# Patient Record
Sex: FEMALE | Race: WHITE | Employment: FULL TIME | URBAN - METROPOLITAN AREA
[De-identification: names, ages, dates, MRNs, and addresses within clinical notes are randomized per-mention and may not be internally consistent; named-entity substitution may affect disease eponyms.]

---

## 2017-01-12 ENCOUNTER — ONCOLOGY VISIT (OUTPATIENT)
Dept: ONCOLOGY | Facility: CLINIC | Age: 67
End: 2017-01-12
Attending: PHYSICIAN ASSISTANT
Payer: COMMERCIAL

## 2017-01-12 ENCOUNTER — APPOINTMENT (OUTPATIENT)
Dept: LAB | Facility: CLINIC | Age: 67
End: 2017-01-12
Attending: INTERNAL MEDICINE
Payer: COMMERCIAL

## 2017-01-12 VITALS
WEIGHT: 119.7 LBS | SYSTOLIC BLOOD PRESSURE: 180 MMHG | DIASTOLIC BLOOD PRESSURE: 78 MMHG | HEART RATE: 86 BPM | OXYGEN SATURATION: 99 % | BODY MASS INDEX: 19.33 KG/M2 | TEMPERATURE: 98.1 F

## 2017-01-12 DIAGNOSIS — C50.212 CARCINOMA OF UPPER-INNER QUADRANT OF LEFT FEMALE BREAST (H): ICD-10-CM

## 2017-01-12 LAB
ALBUMIN SERPL-MCNC: 3.9 G/DL (ref 3.4–5)
ALP SERPL-CCNC: 133 U/L (ref 40–150)
ALT SERPL W P-5'-P-CCNC: 24 U/L (ref 0–50)
ANION GAP SERPL CALCULATED.3IONS-SCNC: 10 MMOL/L (ref 3–14)
AST SERPL W P-5'-P-CCNC: 17 U/L (ref 0–45)
BASOPHILS # BLD AUTO: 0.1 10E9/L (ref 0–0.2)
BASOPHILS NFR BLD AUTO: 1.1 %
BILIRUB SERPL-MCNC: 0.4 MG/DL (ref 0.2–1.3)
BUN SERPL-MCNC: 14 MG/DL (ref 7–30)
CALCIUM SERPL-MCNC: 9.4 MG/DL (ref 8.5–10.1)
CHLORIDE SERPL-SCNC: 101 MMOL/L (ref 94–109)
CO2 SERPL-SCNC: 27 MMOL/L (ref 20–32)
CREAT SERPL-MCNC: 0.8 MG/DL (ref 0.52–1.04)
DIFFERENTIAL METHOD BLD: NORMAL
EOSINOPHIL # BLD AUTO: 0.3 10E9/L (ref 0–0.7)
EOSINOPHIL NFR BLD AUTO: 2.9 %
ERYTHROCYTE [DISTWIDTH] IN BLOOD BY AUTOMATED COUNT: 11.9 % (ref 10–15)
GFR SERPL CREATININE-BSD FRML MDRD: 72 ML/MIN/1.7M2
GLUCOSE SERPL-MCNC: 112 MG/DL (ref 70–99)
HCT VFR BLD AUTO: 35.3 % (ref 35–47)
HGB BLD-MCNC: 11.9 G/DL (ref 11.7–15.7)
IMM GRANULOCYTES # BLD: 0.1 10E9/L (ref 0–0.4)
IMM GRANULOCYTES NFR BLD: 0.6 %
LYMPHOCYTES # BLD AUTO: 2 10E9/L (ref 0.8–5.3)
LYMPHOCYTES NFR BLD AUTO: 23.2 %
MCH RBC QN AUTO: 30.4 PG (ref 26.5–33)
MCHC RBC AUTO-ENTMCNC: 33.7 G/DL (ref 31.5–36.5)
MCV RBC AUTO: 90 FL (ref 78–100)
MONOCYTES # BLD AUTO: 0.6 10E9/L (ref 0–1.3)
MONOCYTES NFR BLD AUTO: 7.4 %
NEUTROPHILS # BLD AUTO: 5.6 10E9/L (ref 1.6–8.3)
NEUTROPHILS NFR BLD AUTO: 64.8 %
NRBC # BLD AUTO: 0 10*3/UL
NRBC BLD AUTO-RTO: 0 /100
PLATELET # BLD AUTO: 266 10E9/L (ref 150–450)
POTASSIUM SERPL-SCNC: 3.7 MMOL/L (ref 3.4–5.3)
PROT SERPL-MCNC: 7.2 G/DL (ref 6.8–8.8)
RBC # BLD AUTO: 3.92 10E12/L (ref 3.8–5.2)
SODIUM SERPL-SCNC: 138 MMOL/L (ref 133–144)
WBC # BLD AUTO: 8.6 10E9/L (ref 4–11)

## 2017-01-12 PROCEDURE — 80053 COMPREHEN METABOLIC PANEL: CPT | Performed by: INTERNAL MEDICINE

## 2017-01-12 PROCEDURE — 85025 COMPLETE CBC W/AUTO DIFF WBC: CPT | Performed by: INTERNAL MEDICINE

## 2017-01-12 PROCEDURE — 99212 OFFICE O/P EST SF 10 MIN: CPT | Mod: 25,ZF

## 2017-01-12 PROCEDURE — 36415 COLL VENOUS BLD VENIPUNCTURE: CPT

## 2017-01-12 PROCEDURE — 99214 OFFICE O/P EST MOD 30 MIN: CPT | Mod: ZP | Performed by: PHYSICIAN ASSISTANT

## 2017-01-12 ASSESSMENT — PAIN SCALES - GENERAL: PAINLEVEL: NO PAIN (0)

## 2017-01-12 NOTE — PROGRESS NOTES
DIAGNOSIS:  Clinical stage II infiltrating ductal carcinoma of the left breast. The patient had noticed a mass in her left breast and went in for evaluation. Mammogram on 09/13/2013 revealed a 2.5 x 2.0 x 0.8 cm left breast mass. Ultrasound-guided biopsy on 09/17/2013 revealed infiltrating ductal carcinoma, Senath grade 3. Tumor was weakly to moderately positive for estrogen receptors and weakly positive for progesterone receptors. HER-2 was amplified. MRI of the breasts revealed a 1.6 x 1.6 x 1.6 cm mass in her left breast and her tumor was too small for the I-SPY-2 clinical trial. She did begin neoadjuvant treatment with weekly Taxol and Herceptin on 10/18/2013 and completed 12 doses on 01/03/2013. She started Adriamycin/Cytoxan (AC) on 01/09/2014 and completed 4 cycles on 02/20/2014. She went on to have a left lumpectomy with left sentinel lymph node biopsy on 03/17/2014. There was no residual cancer. Zero out of 1 lymph nodes was positive. She restarted Herceptin on 04/02/2014 and completed her year on 12/09/2014. She completed left-sided breast radiation receiving 6000 cGy on 05/23/2014. She started letrozole in 03/2014.     INTERVAL HISTORY:  Radha returns to clinic today for followup of her breast carcinoma.  She quit smoking 3 months ago, and I did congratulate her on this.  She does admit to sinus congestion with some intermittent wheezing.  She remains on the letrozole and is tolerating the medication well without any significant side effects.  She denies any chest pain, shortness of breath, cough, nausea, vomiting, abdominal pain, new bone pains or headaches.  She does not have a formal exercise program, but remains quite active at her job with walking.     MEDICATIONS:   Current Outpatient Prescriptions   Medication Sig Dispense Refill     letrozole (FEMARA) 2.5 MG tablet Take 1 tablet (2.5 mg) by mouth daily 90 tablet 3     simvastatin (ZOCOR) 20 MG tablet Take 1 tablet (20 mg) by mouth daily 90  tablet 1     losartan (COZAAR) 100 MG tablet Take 1 tablet (100 mg) by mouth daily 90 tablet 1     metFORMIN (GLUCOPHAGE) 500 MG tablet Take 1 tablet (500 mg) by mouth 2 times daily (with meals) 60 tablet 5     metoprolol (TOPROL-XL) 50 MG 24 hr tablet TAKE ONE TABLET BY MOUTH ONE TIME DAILY  30 tablet 11     hydrochlorothiazide (MICROZIDE) 12.5 MG capsule TAKE ONE CAPSULE BY MOUTH ONE TIME DAILY  90 capsule 3     Multiple Vitamins-Minerals (DIASENSE MULTIVITAMIN PO) Take 1 tablet by mouth daily       ascorbic acid (VITAMIN C) 250 MG CHEW Take 500 mg by mouth daily        Acetaminophen (TYLENOL PO) Take by mouth as needed 500 mg as needed       Calcium Carbonate (CALCIUM 600 PO) Take 1 tablet by mouth 2 times daily        Cholecalciferol (VITAMIN D) 1000 UNITS capsule Take 2 capsules by mouth daily        ALLERGIES:    Allergies   Allergen Reactions     Aspirin GI Disturbance     Dust Mites      And pollen       PHYSICAL EXAMINATION:  Vitals: /78 mmHg  Pulse 86  Temp(Src) 98.1  F (36.7  C) (Oral)  Wt 54.296 kg (119 lb 11.2 oz)  SpO2 99%  LMP  (LMP Unknown)  GENERAL:  A pleasant person in no acute distress.   HEENT:  Sclerae are nonicteric.    NECK:  Supple.   LYMPH NODES:  No peripheral lymphadenopathy noted in the axillary, supraclavicular, or cervical regions.   LUNGS:  Clear to auscultation bilaterally.   HEART:  Regular rate and rhythm, with no murmur appreciated.   ABDOMEN:  Bowel sounds are active.  Soft and nontender.  No hepatosplenomegaly or other masses appreciated.  LOWER EXTREMITIES:  Without pitting edema to the knees bilaterally.   NEUROLOGICAL:  Alert/orientated/able to answer all questions.  CN grossly intact.  BREAST: Right breast normal to inspection, no masses. Left breast, well healed surgical incision in the 10-12 o'clock position, no masses.     LAB:      1/12/2017 10:26   Sodium 138   Potassium 3.7   Chloride 101   Carbon Dioxide 27   Urea Nitrogen 14   Creatinine 0.80   GFR  Estimate 72   GFR Estimate If Black 87   Calcium 9.4   Anion Gap 10   Albumin 3.9   Protein Total 7.2   Bilirubin Total 0.4   Alkaline Phosphatase 133   ALT 24   AST 17   Glucose 112 (H)   WBC 8.6   Hemoglobin 11.9   Hematocrit 35.3   Platelet Count 266   RBC Count 3.92   MCV 90   MCH 30.4   MCHC 33.7   RDW 11.9   Diff Method Automated Method   % Neutrophils 64.8   % Lymphocytes 23.2   % Monocytes 7.4   % Eosinophils 2.9   % Basophils 1.1   % Immature Granulocytes 0.6   Nucleated RBCs 0   Absolute Neutrophil 5.6   Absolute Lymphocytes 2.0   Absolute Monocytes 0.6   Absolute Eosinophils 0.3   Absolute Basophils 0.1   Abs Immature Granulocytes 0.1   Absolute Nucleated RBC 0.0     IMPRESSION/PLAN:   1.  Clinical stage II infiltrating ductal carcinoma of the left breast, ER-positive, HER2-positive. Ms. Uribe continues to do well at this time.  She does not have any signs or symptoms that would suggest recurrence of her breast carcinoma.  We plan to continue the letrozole daily.  She had a mammogram in 10/2016 with no concerns, and we will continue these yearly.  We plan for her to follow up with Dr. Esparza in 3 months.  I did congratulate her on cessation of her smoking for the last    3 months.  I did encourage her to begin an exercise program.   2.  Bone health.  DEXA scan in 04/2014 was consistent with low bone density with a T-score of -2.4.  She remains active with walking at her job daily and takes calcium with vitamin D.  I suggested a follow-up bone density and she has declined at this time.  She understands that there is potential bone loss with the letrozole.       If there are no interval concerns, the patient will follow up in 3 months.     Michelle Coker PA-C

## 2017-01-12 NOTE — Clinical Note
1/12/2017      RE: Radha Uribe  [address]  [facility] [address]       DIAGNOSIS:  Clinical stage II infiltrating ductal carcinoma of the left breast. The patient had noticed a mass in her left breast and went in for evaluation. Mammogram on 09/13/2013 revealed a 2.5 x 2.0 x 0.8 cm left breast mass. Ultrasound-guided biopsy on 09/17/2013 revealed infiltrating ductal carcinoma, Daylin grade 3. Tumor was weakly to moderately positive for estrogen receptors and weakly positive for progesterone receptors. HER-2 was amplified. MRI of the breasts revealed a 1.6 x 1.6 x 1.6 cm mass in her left breast and her tumor was too small for the I-SPY-2 clinical trial. She did begin neoadjuvant treatment with weekly Taxol and Herceptin on 10/18/2013 and completed 12 doses on 01/03/2013. She started Adriamycin/Cytoxan (AC) on 01/09/2014 and completed 4 cycles on 02/20/2014. She went on to have a left lumpectomy with left sentinel lymph node biopsy on 03/17/2014. There was no residual cancer. Zero out of 1 lymph nodes was positive. She restarted Herceptin on 04/02/2014 and completed her year on 12/09/2014. She completed left-sided breast radiation receiving 6000 cGy on 05/23/2014. She started letrozole in 03/2014.     INTERVAL HISTORY:  Radha returns to clinic today for followup of her breast carcinoma.  She quit smoking 3 months ago, and I did congratulate her on this.  She does admit to sinus congestion with some intermittent wheezing.  She remains on the letrozole and is tolerating the medication well without any significant side effects.  She denies any chest pain, shortness of breath, cough, nausea, vomiting, abdominal pain, new bone pains or headaches.  She does not have a formal exercise program, but remains quite active at her job with walking.     MEDICATIONS:   Current Outpatient Prescriptions   Medication Sig Dispense Refill     letrozole (FEMARA) 2.5 MG tablet Take 1 tablet (2.5 mg) by mouth daily 90  tablet 3     simvastatin (ZOCOR) 20 MG tablet Take 1 tablet (20 mg) by mouth daily 90 tablet 1     losartan (COZAAR) 100 MG tablet Take 1 tablet (100 mg) by mouth daily 90 tablet 1     metFORMIN (GLUCOPHAGE) 500 MG tablet Take 1 tablet (500 mg) by mouth 2 times daily (with meals) 60 tablet 5     metoprolol (TOPROL-XL) 50 MG 24 hr tablet TAKE ONE TABLET BY MOUTH ONE TIME DAILY  30 tablet 11     hydrochlorothiazide (MICROZIDE) 12.5 MG capsule TAKE ONE CAPSULE BY MOUTH ONE TIME DAILY  90 capsule 3     Multiple Vitamins-Minerals (DIASENSE MULTIVITAMIN PO) Take 1 tablet by mouth daily       ascorbic acid (VITAMIN C) 250 MG CHEW Take 500 mg by mouth daily        Acetaminophen (TYLENOL PO) Take by mouth as needed 500 mg as needed       Calcium Carbonate (CALCIUM 600 PO) Take 1 tablet by mouth 2 times daily        Cholecalciferol (VITAMIN D) 1000 UNITS capsule Take 2 capsules by mouth daily        ALLERGIES:    Allergies   Allergen Reactions     Aspirin GI Disturbance     Dust Mites      And pollen       PHYSICAL EXAMINATION:  Vitals: /78 mmHg  Pulse 86  Temp(Src) 98.1  F (36.7  C) (Oral)  Wt 54.296 kg (119 lb 11.2 oz)  SpO2 99%  LMP  (LMP Unknown)  GENERAL:  A pleasant person in no acute distress.   HEENT:  Sclerae are nonicteric.    NECK:  Supple.   LYMPH NODES:  No peripheral lymphadenopathy noted in the axillary, supraclavicular, or cervical regions.   LUNGS:  Clear to auscultation bilaterally.   HEART:  Regular rate and rhythm, with no murmur appreciated.   ABDOMEN:  Bowel sounds are active.  Soft and nontender.  No hepatosplenomegaly or other masses appreciated.  LOWER EXTREMITIES:  Without pitting edema to the knees bilaterally.   NEUROLOGICAL:  Alert/orientated/able to answer all questions.  CN grossly intact.  BREAST: Right breast normal to inspection, no masses. Left breast, well healed surgical incision in the 10-12 o'clock position, no masses.     LAB:      1/12/2017 10:26   Sodium 138   Potassium 3.7    Chloride 101   Carbon Dioxide 27   Urea Nitrogen 14   Creatinine 0.80   GFR Estimate 72   GFR Estimate If Black 87   Calcium 9.4   Anion Gap 10   Albumin 3.9   Protein Total 7.2   Bilirubin Total 0.4   Alkaline Phosphatase 133   ALT 24   AST 17   Glucose 112 (H)   WBC 8.6   Hemoglobin 11.9   Hematocrit 35.3   Platelet Count 266   RBC Count 3.92   MCV 90   MCH 30.4   MCHC 33.7   RDW 11.9   Diff Method Automated Method   % Neutrophils 64.8   % Lymphocytes 23.2   % Monocytes 7.4   % Eosinophils 2.9   % Basophils 1.1   % Immature Granulocytes 0.6   Nucleated RBCs 0   Absolute Neutrophil 5.6   Absolute Lymphocytes 2.0   Absolute Monocytes 0.6   Absolute Eosinophils 0.3   Absolute Basophils 0.1   Abs Immature Granulocytes 0.1   Absolute Nucleated RBC 0.0     IMPRESSION/PLAN:   1.  Clinical stage II infiltrating ductal carcinoma of the left breast, ER-positive, HER2-positive. Ms. Uribe continues to do well at this time.  She does not have any signs or symptoms that would suggest recurrence of her breast carcinoma.  We plan to continue the letrozole daily.  She had a mammogram in 10/2016 with no concerns, and we will continue these yearly.  We plan for her to follow up with Dr. Esparza in 3 months.  I did congratulate her on cessation of her smoking for the last    3 months.  I did encourage her to begin an exercise program.   2.  Bone health.  DEXA scan in 04/2014 was consistent with low bone density with a T-score of -2.4.  She remains active with walking at her job daily and takes calcium with vitamin D.  I suggested a follow-up bone density and she has declined at this time.  She understands that there is potential bone loss with the letrozole.       If there are no interval concerns, the patient will follow up in 3 months.     Michelle Coker PA-C

## 2017-01-12 NOTE — Clinical Note
1/12/2017       RE: Radha Uribe  [address]  [facility] [id number]     Dear Colleague,    Thank you for referring your patient, Radha Uribe, to the [facility]. Please see a copy of my visit note below.    DIAGNOSIS:  Clinical stage II infiltrating ductal carcinoma of the left breast. The patient had noticed a mass in her left breast and went in for evaluation. Mammogram on 09/13/2013 revealed a 2.5 x 2.0 x 0.8 cm left breast mass. Ultrasound-guided biopsy on 09/17/2013 revealed infiltrating ductal carcinoma, Daylin grade 3. Tumor was weakly to moderately positive for estrogen receptors and weakly positive for progesterone receptors. HER-2 was amplified. MRI of the breasts revealed a 1.6 x 1.6 x 1.6 cm mass in her left breast and her tumor was too small for the I-SPY-2 clinical trial. She did begin neoadjuvant treatment with weekly Taxol and Herceptin on 10/18/2013 and completed 12 doses on 01/03/2013. She started Adriamycin/Cytoxan (AC) on 01/09/2014 and completed 4 cycles on 02/20/2014. She went on to have a left lumpectomy with left sentinel lymph node biopsy on 03/17/2014. There was no residual cancer. Zero out of 1 lymph nodes was positive. She restarted Herceptin on 04/02/2014 and completed her year on 12/09/2014. She completed left-sided breast radiation receiving 6000 cGy on 05/23/2014. She started letrozole in 03/2014.     INTERVAL HISTORY:   Dictation on: 01/12/2017 10:59 AM by: JAIME MCKEE [[id number]]     MEDICATIONS:   Current Outpatient Prescriptions   Medication Sig Dispense Refill     letrozole (FEMARA) 2.5 MG tablet Take 1 tablet (2.5 mg) by mouth daily 90 tablet 3     simvastatin (ZOCOR) 20 MG tablet Take 1 tablet (20 mg) by mouth daily 90 tablet 1     losartan (COZAAR) 100 MG tablet Take 1 tablet (100 mg) by mouth daily 90 tablet 1     metFORMIN (GLUCOPHAGE) 500 MG tablet Take 1 tablet (500 mg) by mouth 2 times daily (with meals) 60 tablet 5     metoprolol  (TOPROL-XL) 50 MG 24 hr tablet TAKE ONE TABLET BY MOUTH ONE TIME DAILY  30 tablet 11     hydrochlorothiazide (MICROZIDE) 12.5 MG capsule TAKE ONE CAPSULE BY MOUTH ONE TIME DAILY  90 capsule 3     Multiple Vitamins-Minerals (DIASENSE MULTIVITAMIN PO) Take 1 tablet by mouth daily       ascorbic acid (VITAMIN C) 250 MG CHEW Take 500 mg by mouth daily        Acetaminophen (TYLENOL PO) Take by mouth as needed 500 mg as needed       Calcium Carbonate (CALCIUM 600 PO) Take 1 tablet by mouth 2 times daily        Cholecalciferol (VITAMIN D) 1000 UNITS capsule Take 2 capsules by mouth daily        ALLERGIES:    Allergies   Allergen Reactions     Aspirin GI Disturbance     Dust Mites      And pollen       PHYSICAL EXAMINATION:  Vitals: /78 mmHg  Pulse 86  Temp(Src) 98.1  F (36.7  C) (Oral)  Wt 54.296 kg (119 lb 11.2 oz)  SpO2 99%  LMP  (LMP Unknown)  GENERAL:  A pleasant person in no acute distress.   HEENT:  Sclerae are nonicteric.    NECK:  Supple.   LYMPH NODES:  No peripheral lymphadenopathy noted in the axillary, supraclavicular, or cervical regions.   LUNGS:  Clear to auscultation bilaterally.   HEART:  Regular rate and rhythm, with no murmur appreciated.   ABDOMEN:  Bowel sounds are active.  Soft and nontender.  No hepatosplenomegaly or other masses appreciated.  LOWER EXTREMITIES:  Without pitting edema to the knees bilaterally.   NEUROLOGICAL:  Alert/orientated/able to answer all questions.  CN grossly intact.  BREAST: Right breast normal to inspection, no masses. Left breast, well healed surgical incision in the 10-12 o'clock position, no masses.     LAB:      1/12/2017 10:26   Sodium 138   Potassium 3.7   Chloride 101   Carbon Dioxide 27   Urea Nitrogen 14   Creatinine 0.80   GFR Estimate 72   GFR Estimate If Black 87   Calcium 9.4   Anion Gap 10   Albumin 3.9   Protein Total 7.2   Bilirubin Total 0.4   Alkaline Phosphatase 133   ALT 24   AST 17   Glucose 112 (H)   WBC 8.6   Hemoglobin 11.9   Hematocrit  35.3   Platelet Count 266   RBC Count 3.92   MCV 90   MCH 30.4   MCHC 33.7   RDW 11.9   Diff Method Automated Method   % Neutrophils 64.8   % Lymphocytes 23.2   % Monocytes 7.4   % Eosinophils 2.9   % Basophils 1.1   % Immature Granulocytes 0.6   Nucleated RBCs 0   Absolute Neutrophil 5.6   Absolute Lymphocytes 2.0   Absolute Monocytes 0.6   Absolute Eosinophils 0.3   Absolute Basophils 0.1   Abs Immature Granulocytes 0.1   Absolute Nucleated RBC 0.0     IMPRESSION/PLAN:    Dictation on: 01/12/2017 11:01 AM by: MICHELLE MCKEE [[id number]]     Michelle Mckee PA-C    Again, thank you for allowing me to participate in the care of your patient.      Sincerely,    Michelle Mckee PA-C

## 2017-01-12 NOTE — NURSING NOTE
Chief Complaint   Patient presents with     Blood Draw     vs/labs by cma   See doc flowsheets for details. Provider paged to inform about pt's BP  In lab.    CONY Masters CMA

## 2017-01-12 NOTE — NURSING NOTE
"Radha Uribe is a 66 year old female who presents for:  Chief Complaint   Patient presents with     Blood Draw     vs/labs by [facility]     Oncology Clinic Visit     Breast Cancer        Initial Vitals:  /78 mmHg  Pulse 86  Temp(Src) 98.1  F (36.7  C) (Oral)  Wt 54.296 kg (119 lb 11.2 oz)  SpO2 99%  LMP  (LMP Unknown) Estimated body mass index is 19.33 kg/(m^2) as calculated from the following:    Height as of 7/12/16: 1.676 m (5' 5.98\").    Weight as of this encounter: 54.296 kg (119 lb 11.2 oz).. There is no height on file to calculate BSA. BP completed using cuff size: regular  No Pain (0) No LMP recorded (lmp unknown). Patient has had a hysterectomy. Allergies and medications reviewed.     Medications: Medication refills not needed today.  Pharmacy name entered into [facility]:    [id number] - [place], [place] - [address] [facility] - [place], [place] - [address]    Comments: Patient is not having any pain today.     6 minutes for nursing intake (face to face time)   Carmina Stahl CMA         "

## 2017-02-08 DIAGNOSIS — I10 HYPERTENSION GOAL BP (BLOOD PRESSURE) < 140/90: Primary | ICD-10-CM

## 2017-02-08 NOTE — TELEPHONE ENCOUNTER
hydrochlorothiazide (MICROZIDE) 12.5 MG capsule      Last Written Prescription Date: 12/31/15  Last Fill Quantity: 90, # refills: 3  Last Office Visit with G, P or [facility] prescribing provider: 5/18/16       POTASSIUM   Date Value Ref Range Status   01/12/2017 3.7 3.4 - 5.3 mmol/L Final     CREATININE   Date Value Ref Range Status   01/12/2017 0.80 0.52 - 1.04 mg/dL Final     BP Readings from Last 3 Encounters:   01/12/17 180/78   10/14/16 143/79   07/12/16 169/72

## 2017-02-10 RX ORDER — HYDROCHLOROTHIAZIDE 12.5 MG/1
1 CAPSULE ORAL DAILY
Qty: 90 CAPSULE | Refills: 3 | Status: SHIPPED | OUTPATIENT
Start: 2017-02-10 | End: 2018-02-16

## 2017-02-10 NOTE — TELEPHONE ENCOUNTER
Medication is being filled for 1 time refill only due to:  Patient needs to be seen because follow up on B/P.

## 2017-04-09 NOTE — PROGRESS NOTES
HISTORY OF PRESENT ILLNESS: Radha Uribe is a 66-year-old woman who was referred to our Oncology Clinic by Dr. Crespo for evaluation of a newly diagnosed breast cancer. Radha was in her usual health until last month, when she was asked to do a mammogram by her primary care doctor, Dr. Preston. She has not been doing mammogram for the last 20 years, and most of the time she does self breast examinations. She noticed a mass, which was not painful, in her left breast, and immediately went for a mammogram. The mammogram was done on 09/13/2013, which revealed a 2.5 x 2 x 0.8 cm mass, so she had an ultrasound-guided core needle biopsy on 09/17, which revealed infiltrating ductal carcinoma, [place] grade 3 (score 3 for tubule formation, 3 for nuclear atypia, 3 for mitosis). No angiolymphatic invasion was identified. There was a focal necrosis of invasive carcinoma identified, and no definite carcinoma in situ was identified. The greatest length of the needle biopsy at the left breast in the 2 o'clock position showed benign breast tissue with fibrocystic changes. There is a focal intraductal microcalcification identified. The tumor was 15-20% weakly to moderately positive for estrogen receptors and 5% positive for progesterone receptors. HER-2 was amplified. The ratio of HER-2/CEP17 signals were more than 5.9 by FISH analysis. The signal/nucleus was more than 19.4. She was seen by Dr. Crespo at the [facility] on 09/24, who recommended to do an MRI of the breast due to density of the breast tissues, as well as referred her to the Breast Oncology Clinic for further staging workup and management of her breast cancer. MRI of the breast showed BI-RADS 6 and mass in the left breast at the 11 o'clock position that measures about 1.6 x 1.6 x 1.6 cm. The longest diameter in sagittal reconstruction was 1.9 cm. There is no evidence of axillary lymph node involvement on the MRI.   She had neoadjuvant chemotherapy , mastectomy and SNL  sampling which showed pathologic CR and no disease in the LN , currently getting adjuvant radiation and herceptin and letrozole.    FOLLOWUP NOTE      INTERVAL HISTORY:  Radha returns to the clinic.  She has been feeling generally well, although she has some degree of anxiety.  She has no pain, no fatigue, no depression.  She had stopped smoking for 4 months, and then restarted her smoking.  She continues working at [employer].  She has about a 30-pack-year smoking history.      REVIEW OF SYSTEMS:  She has some wheezing.  The remainder of a 10-point review of systems is negative.      PHYSICAL EXAMINATION:   VITAL SIGNS:  Blood pressure 140/80, temperature 98.2, pulse 75, respirations not recorded, weight 50.9 kg.   GENERAL:  Radha appeared generally well.  She has no alopecia.   HEENT:  Oropharynx is without lesions.   LYMPH:  There is no palpable cervical, supraclavicular, subclavicular or axillary lymphadenopathy.   BREASTS:  Examination of the right breast is without masses.  Examination of the left breast reveals a well-healed incision at the 11 o'clock position, 2 fingerbreadths from the nipple-areolar complex, without erythema or masses.  No masses in the left breast.  The left axillary incision is well-healed without erythema or masses.   LUNGS:  Clear to percussion and auscultation, except for wheezing in the right upper lobe.   HEART:  There is a regular rate and rhythm, and a 2/6 systolic murmur heard best at left sternal border.   ABDOMEN:  Soft and nontender, without hepatosplenomegaly.   EXTREMITIES:  Without edema.   PSYCHIATRIC:  Mood and affect were normal.      LABORATORY DATA:  The CMP is within normal limits.  CBC is within normal limits.  Glucose was 116.      ASSESSMENT AND PLAN:     1.  Radha Uribe is a 67-year-old woman who was diagnosed in 2013 with a stage IIA, T2 N2 MX, invasive ductal carcinoma of the left breast.  The tumor was positive for estrogen receptor in 15-20% of the cells,  TN-positive in 5% of the cells, and HER2 was amplified.  The left breast mass measured 2.5 x 2 x 0.8 cm in size at the 11 o'clock position of the left breast on the mammogram.  She was treated with neoadjuvant Taxol and Herceptin for 12 weeks followed by dose-dense AC for 4 cycles.  She had a complete pathologic response in the lumpectomy specimen.  She completed radiation in 04/2014.  She tolerated the treatment quite well.  She began letrozole, and has tolerated it very well with no hot flashes, myalgias or arthralgias.  She had a mammogram on 10/13/2016 which showed benign results.   2.  Letrozole therapy is recommended for 10 years given the outcome of the MA.17R trial.  We could consider the breast cancer index to reduce the length of time and treatment with letrozole from 10 years to 5 years, but on the other hand this has not been applied for HER2-positive breast cancers, and she is at somewhat increased risk of distant recurrence.   3.  Continue with mammography performed yearly.   4.  Basal cell carcinoma of the left chin was removed.  She will follow up with Dr. Morgan in Dermatology.   5.  Thyroid mass.  She did have a thyroid biopsy, which showed benign results, and she will follow up with Dr. Hernandez.   6.  Focal wheezing on the lung exam.  I did recommend a spiral CT of the chest.  She refused.  She will follow up with her primary care provider at the [facility] in another month and will discuss this with him.  I expressed my concerns about her smoking, and I strongly recommend that she cease tobacco use.  She said that she will work on it, but she does not have any plans to stop smoking at the present time.   7.  A 2/6 systolic murmur at the left sternal border.  I discussed with Radha that it would make sense to have her primary care provider perform an echocardiogram.   8.  DEXA scan showed osteopenia with a most valid negative T score of -2.4 on 04/30/2014.  Radha does not want to pursue  this finding.  She does not want treatment with Reclast.   9.  Followup.  Radha will see Michelle Coker in followup in 3 months, and then in 6 months with me with a mammogram. Follow up with Michelle Coker July 13 and with me 10-12-17 with a mammogram.  CBC, CMP with follow up visits.       Thank you for allowing us to continue to participate in Radha Uribe's care.      South Esparza MD          [facility]     I spent 30 minutes with the patient more than 50% of which was in counseling and coordination of care.

## 2017-04-13 ENCOUNTER — ONCOLOGY VISIT (OUTPATIENT)
Dept: ONCOLOGY | Facility: CLINIC | Age: 67
End: 2017-04-13
Attending: INTERNAL MEDICINE
Payer: COMMERCIAL

## 2017-04-13 ENCOUNTER — APPOINTMENT (OUTPATIENT)
Dept: LAB | Facility: CLINIC | Age: 67
End: 2017-04-13
Attending: INTERNAL MEDICINE
Payer: COMMERCIAL

## 2017-04-13 VITALS
DIASTOLIC BLOOD PRESSURE: 80 MMHG | HEART RATE: 75 BPM | TEMPERATURE: 98.2 F | BODY MASS INDEX: 18.12 KG/M2 | WEIGHT: 112.2 LBS | SYSTOLIC BLOOD PRESSURE: 140 MMHG

## 2017-04-13 DIAGNOSIS — C50.212 CARCINOMA OF UPPER-INNER QUADRANT OF LEFT FEMALE BREAST (H): ICD-10-CM

## 2017-04-13 LAB
ALBUMIN SERPL-MCNC: 4 G/DL (ref 3.4–5)
ALP SERPL-CCNC: 63 U/L (ref 40–150)
ALT SERPL W P-5'-P-CCNC: 18 U/L (ref 0–50)
ANION GAP SERPL CALCULATED.3IONS-SCNC: 7 MMOL/L (ref 3–14)
AST SERPL W P-5'-P-CCNC: 18 U/L (ref 0–45)
BASOPHILS # BLD AUTO: 0.1 10E9/L (ref 0–0.2)
BASOPHILS NFR BLD AUTO: 0.7 %
BILIRUB SERPL-MCNC: 0.8 MG/DL (ref 0.2–1.3)
BUN SERPL-MCNC: 14 MG/DL (ref 7–30)
CALCIUM SERPL-MCNC: 9.2 MG/DL (ref 8.5–10.1)
CHLORIDE SERPL-SCNC: 104 MMOL/L (ref 94–109)
CO2 SERPL-SCNC: 25 MMOL/L (ref 20–32)
CREAT SERPL-MCNC: 0.78 MG/DL (ref 0.52–1.04)
DIFFERENTIAL METHOD BLD: NORMAL
EOSINOPHIL # BLD AUTO: 0.1 10E9/L (ref 0–0.7)
EOSINOPHIL NFR BLD AUTO: 1.3 %
ERYTHROCYTE [DISTWIDTH] IN BLOOD BY AUTOMATED COUNT: 12.4 % (ref 10–15)
GFR SERPL CREATININE-BSD FRML MDRD: 74 ML/MIN/1.7M2
GLUCOSE SERPL-MCNC: 116 MG/DL (ref 70–99)
HCT VFR BLD AUTO: 36.2 % (ref 35–47)
HGB BLD-MCNC: 12.1 G/DL (ref 11.7–15.7)
IMM GRANULOCYTES # BLD: 0 10E9/L (ref 0–0.4)
IMM GRANULOCYTES NFR BLD: 0.3 %
LYMPHOCYTES # BLD AUTO: 1.7 10E9/L (ref 0.8–5.3)
LYMPHOCYTES NFR BLD AUTO: 16.5 %
MCH RBC QN AUTO: 30.2 PG (ref 26.5–33)
MCHC RBC AUTO-ENTMCNC: 33.4 G/DL (ref 31.5–36.5)
MCV RBC AUTO: 90 FL (ref 78–100)
MONOCYTES # BLD AUTO: 0.7 10E9/L (ref 0–1.3)
MONOCYTES NFR BLD AUTO: 7 %
NEUTROPHILS # BLD AUTO: 7.6 10E9/L (ref 1.6–8.3)
NEUTROPHILS NFR BLD AUTO: 74.2 %
NRBC # BLD AUTO: 0 10*3/UL
NRBC BLD AUTO-RTO: 0 /100
PLATELET # BLD AUTO: 249 10E9/L (ref 150–450)
POTASSIUM SERPL-SCNC: 3.8 MMOL/L (ref 3.4–5.3)
PROT SERPL-MCNC: 7.4 G/DL (ref 6.8–8.8)
RBC # BLD AUTO: 4.01 10E12/L (ref 3.8–5.2)
SODIUM SERPL-SCNC: 136 MMOL/L (ref 133–144)
WBC # BLD AUTO: 10.2 10E9/L (ref 4–11)

## 2017-04-13 PROCEDURE — 80053 COMPREHEN METABOLIC PANEL: CPT | Performed by: INTERNAL MEDICINE

## 2017-04-13 PROCEDURE — 99212 OFFICE O/P EST SF 10 MIN: CPT | Mod: ZF

## 2017-04-13 PROCEDURE — 85025 COMPLETE CBC W/AUTO DIFF WBC: CPT | Performed by: INTERNAL MEDICINE

## 2017-04-13 PROCEDURE — 99214 OFFICE O/P EST MOD 30 MIN: CPT | Mod: ZP | Performed by: INTERNAL MEDICINE

## 2017-04-13 NOTE — NURSING NOTE
"Radha Uribe is a 67 year old female who presents for:  Chief Complaint   Patient presents with     Blood Draw     vs/labs drawn in right AC by [facility]     Oncology Clinic Visit     Return for Breast Ca f\u , labs         Initial Vitals:  /80  Pulse 75  Temp 98.2  F (36.8  C) (Oral)  Wt 50.9 kg (112 lb 3.2 oz)  LMP  (LMP Unknown)  BMI 18.12 kg/m2 Estimated body mass index is 18.12 kg/(m^2) as calculated from the following:    Height as of 7/12/16: 1.676 m (5' 5.98\").    Weight as of this encounter: 50.9 kg (112 lb 3.2 oz).. Body surface area is 1.54 meters squared. BP completed using cuff size: regular  Data Unavailable No LMP recorded (lmp unknown). Patient has had a hysterectomy. Allergies and medications reviewed.     Medications: Medication refills not needed today.  Pharmacy name entered into [employer] [id number] [place], [place] - [facility]    Comments:     5  minutes for nursing intake (face to face time)   Rosmery Ashby MA        "

## 2017-04-13 NOTE — MR AVS SNAPSHOT
After Visit Summary   4/13/2017    Radha Uribe    MRN: [id number]           Patient Information     Date Of Birth          1950        Visit Information        Provider Department      4/13/2017 9:30 AM South Esparza MD [facility]        Today's Diagnoses     Carcinoma of upper-inner quadrant of left female breast (H)           Follow-ups after your visit        Follow-up notes from your care team     Return in about 6 months (around 10/12/2017).      Who to contact     If you have questions or need follow up information about today's clinic visit or your schedule please contact [facility] directly at [phone].  Normal or non-critical lab and imaging results will be communicated to you by MyChart, letter or phone within 4 business days after the clinic has received the results. If you do not hear from us within 7 days, please contact the clinic through [employer] or phone. If you have a critical or abnormal lab result, we will notify you by phone as soon as possible.  Submit refill requests through [employer] or call your pharmacy and they will forward the refill request to us. Please allow 3 business days for your refill to be completed.          Additional Information About Your Visit        MyChart Information     [employer] gives you secure access to your electronic health record. If you see a primary care provider, you can also send messages to your care team and make appointments. If you have questions, please call your primary care clinic.  If you do not have a primary care provider, please call [phone] and they will assist you.        Care EveryWhere ID     This is your Care EveryWhere ID. This could be used by other organizations to access your [place] medical records  [id number]        Your Vitals Were     Pulse Temperature Last Period BMI (Body Mass Index)          75 98.2  F (36.8  C) (Oral) (LMP Unknown) 18.12 kg/m2          Blood Pressure from Last 3 Encounters:   04/13/17 140/80   01/12/17 180/78   10/14/16 143/79    Weight from Last 3 Encounters:   04/13/17 50.9 kg (112 lb 3.2 oz)   01/12/17 54.3 kg (119 lb 11.2 oz)   10/14/16 48.5 kg (107 lb)              We Performed the Following     CBC with platelets differential     Comprehensive metabolic panel        Primary Care Provider Office Phone # Fax #    Kodak Preston [phone]       [facility] [address]  [facility] [address]        Thank you!     Thank you for choosing [facility]  for your care. Our goal is always to provide you with excellent care. Hearing back from our patients is one way we can continue to improve our services. Please take a few minutes to complete the written survey that you may receive in the mail after your visit with us. Thank you!             Your Updated Medication List - Protect others around you: Learn how to safely use, store and throw away your medicines at www.disposemymeds.org.          This list is accurate as of: 4/13/17 11:59 PM.  Always use your most recent med list.                   Brand Name Dispense Instructions for use    ascorbic acid 250 MG Chew chewable tablet    vitamin C     Take 500 mg by mouth daily       CALCIUM 600 PO      Take 1 tablet by mouth 2 times daily       DIASENSE MULTIVITAMIN PO      Take 1 tablet by mouth daily       hydrochlorothiazide 12.5 MG capsule    MICROZIDE    90 capsule    Take 1 capsule (12.5 mg) by mouth daily       letrozole 2.5 MG tablet    FEMARA    90 tablet    Take 1 tablet (2.5 mg) by mouth daily       losartan 100 MG tablet    COZAAR    90 tablet    Take 1 tablet (100 mg) by mouth daily       metFORMIN 500 MG tablet    GLUCOPHAGE    60 tablet    Take 1 tablet (500 mg) by mouth 2 times daily (with meals)       metoprolol 50 MG 24 hr tablet    TOPROL-XL    30 tablet    TAKE ONE TABLET BY MOUTH ONE TIME DAILY       simvastatin 20 MG tablet     ZOCOR    90 tablet    Take 1 tablet (20 mg) by mouth daily       TYLENOL PO      Take by mouth as needed 500 mg as needed       vitamin D 1000 UNITS capsule      Take 2 capsules by mouth daily

## 2017-04-13 NOTE — NURSING NOTE
Chief Complaint   Patient presents with     Blood Draw     vs/labs drawn in right AC by cma   See flowsheets.  Gris Masters, CMA

## 2017-04-13 NOTE — LETTER
4/13/2017       RE: Radha Uribe  [address]  [facility] [address]     Dear Colleague,    Thank you for referring your patient, Radha Uribe, to the [facility]. Please see a copy of my visit note below.    HISTORY OF PRESENT ILLNESS: Radha Uribe is a 66-year-old woman who was referred to our Oncology Clinic by Dr. Crespo for evaluation of a newly diagnosed breast cancer. Radha was in her usual health until last month, when she was asked to do a mammogram by her primary care doctor, Dr. Preston. She has not been doing mammogram for the last 20 years, and most of the time she does self breast examinations. She noticed a mass, which was not painful, in her left breast, and immediately went for a mammogram. The mammogram was done on 09/13/2013, which revealed a 2.5 x 2 x 0.8 cm mass, so she had an ultrasound-guided core needle biopsy on 09/17, which revealed infiltrating ductal carcinoma, [place] grade 3 (score 3 for tubule formation, 3 for nuclear atypia, 3 for mitosis). No angiolymphatic invasion was identified. There was a focal necrosis of invasive carcinoma identified, and no definite carcinoma in situ was identified. The greatest length of the needle biopsy at the left breast in the 2 o'clock position showed benign breast tissue with fibrocystic changes. There is a focal intraductal microcalcification identified. The tumor was 15-20% weakly to moderately positive for estrogen receptors and 5% positive for progesterone receptors. HER-2 was amplified. The ratio of HER-2/CEP17 signals were more than 5.9 by FISH analysis. The signal/nucleus was more than 19.4. She was seen by Dr. Crespo at the Breast Clinic on 09/24, who recommended to do an MRI of the breast due to density of the breast tissues, as well as referred her to the Breast Oncology Clinic for further staging workup and management of her breast cancer. MRI of the breast showed BI-RADS 6 and mass in the left breast at the 11  o'clock position that measures about 1.6 x 1.6 x 1.6 cm. The longest diameter in sagittal reconstruction was 1.9 cm. There is no evidence of axillary lymph node involvement on the MRI.   She had neoadjuvant chemotherapy , mastectomy and SNL sampling which showed pathologic CR and no disease in the LN , currently getting adjuvant radiation and herceptin and letrozole.    FOLLOWUP NOTE      INTERVAL HISTORY:  Radha returns to the clinic.  She has been feeling generally well, although she has some degree of anxiety.  She has no pain, no fatigue, no depression.  She had stopped smoking for 4 months, and then restarted her smoking.  She continues working at [employer].  She has about a 30-pack-year smoking history.      REVIEW OF SYSTEMS:  She has some wheezing.  The remainder of a 10-point review of systems is negative.      PHYSICAL EXAMINATION:   VITAL SIGNS:  Blood pressure 140/80, temperature 98.2, pulse 75, respirations not recorded, weight 50.9 kg.   GENERAL:  Radha appeared generally well.  She has no alopecia.   HEENT:  Oropharynx is without lesions.   LYMPH:  There is no palpable cervical, supraclavicular, subclavicular or axillary lymphadenopathy.   BREASTS:  Examination of the right breast is without masses.  Examination of the left breast reveals a well-healed incision at the 11 o'clock position, 2 fingerbreadths from the nipple-areolar complex, without erythema or masses.  No masses in the left breast.  The left axillary incision is well-healed without erythema or masses.   LUNGS:  Clear to percussion and auscultation, except for wheezing in the right upper lobe.   HEART:  There is a regular rate and rhythm, and a 2/6 systolic murmur heard best at left sternal border.   ABDOMEN:  Soft and nontender, without hepatosplenomegaly.   EXTREMITIES:  Without edema.   PSYCHIATRIC:  Mood and affect were normal.      LABORATORY DATA:  The CMP is within normal limits.  CBC is within normal limits.  Glucose was 116.       ASSESSMENT AND PLAN:     1.  Radha Uribe is a 67-year-old woman who was diagnosed in 2013 with a stage IIA, T2 N2 MX, invasive ductal carcinoma of the left breast.  The tumor was positive for estrogen receptor in 15-20% of the cells, [place]-positive in 5% of the cells, and HER2 was amplified.  The left breast mass measured 2.5 x 2 x 0.8 cm in size at the 11 o'clock position of the left breast on the mammogram.  She was treated with neoadjuvant Taxol and Herceptin for 12 weeks followed by dose-dense AC for 4 cycles.  She had a complete pathologic response in the lumpectomy specimen.  She completed radiation in 04/2014.  She tolerated the treatment quite well.  She began letrozole, and has tolerated it very well with no hot flashes, myalgias or arthralgias.  She had a mammogram on 10/13/2016 which showed benign results.   2.  Letrozole therapy is recommended for 10 years given the outcome of the MA.17R trial.  We could consider the breast cancer index to reduce the length of time and treatment with letrozole from 10 years to 5 years, but on the other hand this has not been applied for HER2-positive breast cancers, and she is at somewhat increased risk of distant recurrence.   3.  Continue with mammography performed yearly.   4.  Basal cell carcinoma of the left chin was removed.  She will follow up with Dr. Morgan in Dermatology.   5.  Thyroid mass.  She did have a thyroid biopsy, which showed benign results, and she will follow up with Dr. Hernandez.   6.  Focal wheezing on the lung exam.  I did recommend a spiral CT of the chest.  She refused.  She will follow up with her primary care provider at the [facility] in another month and will discuss this with him.  I expressed my concerns about her smoking, and I strongly recommend that she cease tobacco use.  She said that she will work on it, but she does not have any plans to stop smoking at the present time.   7.  A 2/6 systolic murmur at the left sternal  border.  I discussed with Radha that it would make sense to have her primary care provider perform an echocardiogram.   8.  DEXA scan showed osteopenia with a most valid negative T score of -2.4 on 04/30/2014.  Radha does not want to pursue this finding.  She does not want treatment with Reclast.   9.  Followup.  Radha will see Michelle Coker in followup in 3 months, and then in 6 months with me with a mammogram. Follow up with Michelle Coker July 13 and with me 10-12-17 with a mammogram.  CBC, CMP with follow up visits.       Thank you for allowing us to continue to participate in Radha Uribe's care.      South Esparza MD          [facility]     I spent 30 minutes with the patient more than 50% of which was in counseling and coordination of care.

## 2017-05-10 ENCOUNTER — OFFICE VISIT (OUTPATIENT)
Dept: FAMILY MEDICINE | Facility: CLINIC | Age: 67
End: 2017-05-10
Payer: COMMERCIAL

## 2017-05-10 VITALS
SYSTOLIC BLOOD PRESSURE: 156 MMHG | OXYGEN SATURATION: 98 % | RESPIRATION RATE: 16 BRPM | HEART RATE: 90 BPM | BODY MASS INDEX: 18.91 KG/M2 | TEMPERATURE: 96.7 F | HEIGHT: 65 IN | WEIGHT: 113.5 LBS | DIASTOLIC BLOOD PRESSURE: 84 MMHG

## 2017-05-10 DIAGNOSIS — E78.00 HYPERCHOLESTEROLEMIA: ICD-10-CM

## 2017-05-10 DIAGNOSIS — E11.9 TYPE 2 DIABETES MELLITUS WITHOUT COMPLICATION, WITHOUT LONG-TERM CURRENT USE OF INSULIN (H): ICD-10-CM

## 2017-05-10 DIAGNOSIS — I10 ESSENTIAL HYPERTENSION, BENIGN: Primary | Chronic | ICD-10-CM

## 2017-05-10 DIAGNOSIS — E11.9 CONTROLLED TYPE 2 DIABETES MELLITUS WITHOUT COMPLICATION, WITHOUT LONG-TERM CURRENT USE OF INSULIN (H): Chronic | ICD-10-CM

## 2017-05-10 LAB — HBA1C MFR BLD: 6.2 % (ref 4.3–6)

## 2017-05-10 PROCEDURE — 84450 TRANSFERASE (AST) (SGOT): CPT | Performed by: FAMILY MEDICINE

## 2017-05-10 PROCEDURE — 36415 COLL VENOUS BLD VENIPUNCTURE: CPT | Performed by: FAMILY MEDICINE

## 2017-05-10 PROCEDURE — 84443 ASSAY THYROID STIM HORMONE: CPT | Performed by: FAMILY MEDICINE

## 2017-05-10 PROCEDURE — 82043 UR ALBUMIN QUANTITATIVE: CPT | Performed by: FAMILY MEDICINE

## 2017-05-10 PROCEDURE — 84460 ALANINE AMINO (ALT) (SGPT): CPT | Performed by: FAMILY MEDICINE

## 2017-05-10 PROCEDURE — 99214 OFFICE O/P EST MOD 30 MIN: CPT | Performed by: FAMILY MEDICINE

## 2017-05-10 PROCEDURE — 83721 ASSAY OF BLOOD LIPOPROTEIN: CPT | Performed by: FAMILY MEDICINE

## 2017-05-10 PROCEDURE — 83036 HEMOGLOBIN GLYCOSYLATED A1C: CPT | Performed by: FAMILY MEDICINE

## 2017-05-10 PROCEDURE — 80048 BASIC METABOLIC PNL TOTAL CA: CPT | Performed by: FAMILY MEDICINE

## 2017-05-10 RX ORDER — SIMVASTATIN 20 MG
20 TABLET ORAL DAILY
Qty: 90 TABLET | Refills: 1 | Status: SHIPPED | OUTPATIENT
Start: 2017-05-10 | End: 2017-12-06

## 2017-05-10 RX ORDER — LOSARTAN POTASSIUM 100 MG/1
100 TABLET ORAL DAILY
Qty: 90 TABLET | Refills: 1 | Status: SHIPPED | OUTPATIENT
Start: 2017-05-10 | End: 2017-12-06

## 2017-05-10 RX ORDER — METOPROLOL SUCCINATE 50 MG/1
50 TABLET, EXTENDED RELEASE ORAL DAILY
Qty: 30 TABLET | Refills: 11 | Status: SHIPPED | OUTPATIENT
Start: 2017-05-10 | End: 2017-12-06 | Stop reason: DRUGHIGH

## 2017-05-10 NOTE — MR AVS SNAPSHOT
After Visit Summary   5/10/2017    Radha Uribe    MRN: [id number]           Patient Information     Date Of Birth          1950        Visit Information        Provider Department      5/10/2017 3:15 PM Kodak Preston MD [facility]        Today's Diagnoses     Essential hypertension, benign    -  1    Controlled type 2 diabetes mellitus without complication, without long-term current use of insulin (H)        Hypercholesterolemia        Type 2 diabetes mellitus without complication, without long-term current use of insulin (H)           Follow-ups after your visit        Follow-up notes from your care team     Return in about 6 months (around 11/10/2017).      Your next 10 appointments already scheduled     Jul 13, 2017 12:00 PM CDT   [facility] with  [facility]   [facility] ([facility])    [address] [place] [id number]   [phone]            Jul 13, 2017 12:30 PM CDT   (Arrive by 12:15 PM)   Return Visit with Michelle Coker PA-C   [facility] ([facility])    [address] [place] [address]   [phone]            Oct 12, 2017 12:30 PM CDT   [facility] Draw with  [facility] DRAW   [facility] ([facility])    [address] [place] [address]   [phone]            Oct 12, 2017  1:00 PM CDT   (Arrive by 12:45 PM)   MA DIAGNOSTIC DIGITAL BILATERAL with [id number]   [facility] ([facility])    [address] [place] [address]   [phone]           Do not use any powder, lotion or deodorant under your arms or on your breast. If you do, we will ask you to remove it before your exam.  Wear comfortable, two-piece clothing.  If you have any allergies, tell your care  "team.  Bring any previous mammograms from other facilities or have them mailed to the breast center.            Oct 12, 2017  1:30 PM CDT   (Arrive by 1:15 PM)   Return Visit with South Esparza MD   [facility] ([facility])    [address] [address] [phone]              Who to contact     If you have questions or need follow up information about today's clinic visit or your schedule please contact [facility] directly at [phone].  Normal or non-critical lab and imaging results will be communicated to you by MyChart, letter or phone within 4 business days after the clinic has received the results. If you do not hear from us within 7 days, please contact the clinic through [employer] or phone. If you have a critical or abnormal lab result, we will notify you by phone as soon as possible.  Submit refill requests through [employer] or call your pharmacy and they will forward the refill request to us. Please allow 3 business days for your refill to be completed.          Additional Information About Your Visit        MyChart Information     [employer] gives you secure access to your electronic health record. If you see a primary care provider, you can also send messages to your care team and make appointments. If you have questions, please call your primary care clinic.  If you do not have a primary care provider, please call [phone] and they will assist you.        Care EveryWhere ID     This is your Care EveryWhere ID. This could be used by other organizations to access your [facility] medical records  [id number]        Your Vitals Were     Pulse Temperature Respirations Height Last Period Pulse Oximetry    90 96.7  F (35.9  C) (Tympanic) 16 5' 5.32\" (1.659 m) (LMP Unknown) 98%    BMI (Body Mass Index)                   18.71 kg/m2            Blood Pressure from Last 3 Encounters: "   05/10/17 156/84   04/13/17 140/80   01/12/17 180/78    Weight from Last 3 Encounters:   05/10/17 113 lb 8 oz (51.5 kg)   04/13/17 112 lb 3.2 oz (50.9 kg)   01/12/17 119 lb 11.2 oz (54.3 kg)              We Performed the Following     Albumin Random Urine Quantitative     ALT     AST     Basic metabolic panel     Hemoglobin A1c     LDL cholesterol direct     TSH with free T4 reflex          Today's Medication Changes          These changes are accurate as of: 5/10/17  3:43 PM.  If you have any questions, ask your nurse or doctor.               These medicines have changed or have updated prescriptions.        Dose/Directions    metoprolol 50 MG 24 hr tablet   Commonly known as:  TOPROL-XL   This may have changed:  See the new instructions.   Used for:  Essential hypertension, benign   Changed by:  Kodak Preston MD        Dose:  50 mg   Take 1 tablet (50 mg) by mouth daily   Quantity:  30 tablet   Refills:  11            Where to get your medicines      These medications were sent to [place] [address] [place] [address] [address]     Phone:  [phone]     losartan 100 MG tablet    metFORMIN 500 MG tablet    metoprolol 50 MG 24 hr tablet    simvastatin 20 MG tablet                Primary Care Provider Office Phone # Fax #    Kodak Preston [phone]       [facility] [address] [facility] [address]        Thank you!     Thank you for choosing [facility]  for your care. Our goal is always to provide you with excellent care. Hearing back from our patients is one way we can continue to improve our services. Please take a few minutes to complete the written survey that you may receive in the mail after your visit with us. Thank you!             Your Updated Medication List - Protect others around you: Learn how to safely use, store and throw away your medicines at www.disposemymeds.org.           This list is accurate as of: 5/10/17  3:43 PM.  Always use your most recent med list.                   Brand Name Dispense Instructions for use    ascorbic acid 250 MG Chew chewable tablet    vitamin C     Take 500 mg by mouth daily       CALCIUM 600 PO      Take 1 tablet by mouth 2 times daily       DIASENSE MULTIVITAMIN PO      Take 1 tablet by mouth daily       hydrochlorothiazide 12.5 MG capsule    MICROZIDE    90 capsule    Take 1 capsule (12.5 mg) by mouth daily       letrozole 2.5 MG tablet    FEMARA    90 tablet    Take 1 tablet (2.5 mg) by mouth daily       losartan 100 MG tablet    COZAAR    90 tablet    Take 1 tablet (100 mg) by mouth daily       metFORMIN 500 MG tablet    GLUCOPHAGE    60 tablet    Take 1 tablet (500 mg) by mouth 2 times daily (with meals)       metoprolol 50 MG 24 hr tablet    TOPROL-XL    30 tablet    Take 1 tablet (50 mg) by mouth daily       simvastatin 20 MG tablet    ZOCOR    90 tablet    Take 1 tablet (20 mg) by mouth daily       TYLENOL PO      Take by mouth as needed 500 mg as needed       vitamin D 1000 UNITS capsule      Take 2 capsules by mouth daily

## 2017-05-10 NOTE — PROGRESS NOTES
"  SUBJECTIVE:                                                    Radha Uribe is a 67 year old female who presents to clinic today for the following health issues:      Diabetes Follow-up      Patient is checking blood sugars: not at all    Diabetic concerns: None     Symptoms of hypoglycemia (low blood sugar): none     Paresthesias (numbness or burning in feet) or sores: No     Date of last diabetic eye exam: 2012     Hyperlipidemia Follow-Up      Rate your low fat/cholesterol diet?: good    Taking statin?  n/a    Other lipid medications/supplements?:  none     Hypertension Follow-up      Outpatient blood pressures are not being checked.    Low Salt Diet: no added salt         Amount of exercise or physical activity: 6-7 days/week for an average of 15-30 minutes    Problems taking medications regularly: No    Medication side effects: none    Diet: regular (no restrictions)          Problem list and histories reviewed & adjusted, as indicated.  Additional history: as documented    Labs reviewed in EPIC    Reviewed and updated as needed this visit by clinical staff       Reviewed and updated as needed this visit by Provider         ROS:  CONSTITUTIONAL:NEGATIVE for fever, chills, change in weight  ENT/MOUTH: NEGATIVE for ear, mouth and throat problems  RESP:NEGATIVE for significant cough or SOB  CV: NEGATIVE for chest pain, palpitations or peripheral edema  MUSCULOSKELETAL: NEGATIVE for significant arthralgias or myalgia  ENDOCRINE: NEGATIVE for temperature intolerance, skin/hair changes and POSITIVE  for HX diabetes    OBJECTIVE:                                                    /84  Pulse 90  Temp 96.7  F (35.9  C) (Tympanic)  Resp 16  Ht 5' 5.32\" (1.659 m)  Wt 113 lb 8 oz (51.5 kg)  LMP  (LMP Unknown)  SpO2 98%  BMI 18.71 kg/m2  Body mass index is 18.71 kg/(m^2).  GENERAL APPEARANCE: healthy, alert and no distress  NECK: no adenopathy, no asymmetry, masses, or scars, thyroid normal to palpation " and no bruits  RESP: lungs clear to auscultation - no rales, rhonchi or wheezes  CV: regular rates and rhythm, normal S1 S2, no S3 or S4 and no murmur, click or rub  MS: extremities normal- no gross deformities noted  PSYCH: mentation appears normal and affect normal/bright    Diagnostic test results:  Lab: see below, results pending     ASSESSMENT/PLAN:                                                        ICD-10-CM    1. Essential hypertension, benign I10 Basic metabolic panel     metoprolol (TOPROL-XL) 50 MG 24 hr tablet     losartan (COZAAR) 100 MG tablet   2. Controlled type 2 diabetes mellitus without complication, without long-term current use of insulin (H) E11.9 Hemoglobin A1c     Basic metabolic panel     Albumin Random Urine Quantitative     TSH with free T4 reflex   3. Hypercholesterolemia E78.00 LDL cholesterol direct     ALT     AST     simvastatin (ZOCOR) 20 MG tablet   4. Type 2 diabetes mellitus without complication, without long-term current use of insulin (H) E11.9 metFORMIN (GLUCOPHAGE) 500 MG tablet       Follow up with Provider - 6 mo   Patient Instructions   Watch diet, keep carbohydrates controlled, limit salt      Kodak Preston MD  [facility]

## 2017-05-10 NOTE — NURSING NOTE
"Chief Complaint   Patient presents with     Diabetes     Lipids     Hypertension       Initial /90 (BP Location: Right arm, Patient Position: Chair, Cuff Size: Adult Regular)  Pulse 90  Temp 96.7  F (35.9  C) (Tympanic)  Resp 16  Ht 5' 5.32\" (1.659 m)  Wt 113 lb 8 oz (51.5 kg)  LMP  (LMP Unknown)  SpO2 98%  BMI 18.71 kg/m2 Estimated body mass index is 18.71 kg/(m^2) as calculated from the following:    Height as of this encounter: 5' 5.32\" (1.659 m).    Weight as of this encounter: 113 lb 8 oz (51.5 kg).  Medication Reconciliation: complete     Princess CHRISTIANO Malloy CMA      "

## 2017-05-11 LAB
ALT SERPL W P-5'-P-CCNC: 54 U/L (ref 0–50)
ANION GAP SERPL CALCULATED.3IONS-SCNC: 9 MMOL/L (ref 3–14)
AST SERPL W P-5'-P-CCNC: 39 U/L (ref 0–45)
BUN SERPL-MCNC: 21 MG/DL (ref 7–30)
CALCIUM SERPL-MCNC: 9.5 MG/DL (ref 8.5–10.1)
CHLORIDE SERPL-SCNC: 101 MMOL/L (ref 94–109)
CO2 SERPL-SCNC: 26 MMOL/L (ref 20–32)
CREAT SERPL-MCNC: 0.76 MG/DL (ref 0.52–1.04)
CREAT UR-MCNC: 17 MG/DL
GFR SERPL CREATININE-BSD FRML MDRD: 76 ML/MIN/1.7M2
GLUCOSE SERPL-MCNC: 128 MG/DL (ref 70–99)
LDLC SERPL DIRECT ASSAY-MCNC: 106 MG/DL
MICROALBUMIN UR-MCNC: 6 MG/L
MICROALBUMIN/CREAT UR: 34.59 MG/G CR (ref 0–25)
POTASSIUM SERPL-SCNC: 4.2 MMOL/L (ref 3.4–5.3)
SODIUM SERPL-SCNC: 136 MMOL/L (ref 133–144)
TSH SERPL DL<=0.005 MIU/L-ACNC: 1.16 MU/L (ref 0.4–4)

## 2017-07-20 ENCOUNTER — APPOINTMENT (OUTPATIENT)
Dept: LAB | Facility: CLINIC | Age: 67
End: 2017-07-20
Attending: PHYSICIAN ASSISTANT
Payer: COMMERCIAL

## 2017-07-20 ENCOUNTER — ONCOLOGY VISIT (OUTPATIENT)
Dept: ONCOLOGY | Facility: CLINIC | Age: 67
End: 2017-07-20
Attending: PHYSICIAN ASSISTANT
Payer: COMMERCIAL

## 2017-07-20 VITALS
HEIGHT: 65 IN | DIASTOLIC BLOOD PRESSURE: 76 MMHG | SYSTOLIC BLOOD PRESSURE: 158 MMHG | BODY MASS INDEX: 18.76 KG/M2 | TEMPERATURE: 98.6 F | OXYGEN SATURATION: 97 % | RESPIRATION RATE: 16 BRPM | HEART RATE: 79 BPM | WEIGHT: 112.6 LBS

## 2017-07-20 DIAGNOSIS — F17.200 SMOKING: Primary | ICD-10-CM

## 2017-07-20 DIAGNOSIS — C50.212 CARCINOMA OF UPPER-INNER QUADRANT OF LEFT FEMALE BREAST (H): ICD-10-CM

## 2017-07-20 LAB
ALBUMIN SERPL-MCNC: 3.8 G/DL (ref 3.4–5)
ALP SERPL-CCNC: 75 U/L (ref 40–150)
ALT SERPL W P-5'-P-CCNC: 24 U/L (ref 0–50)
ANION GAP SERPL CALCULATED.3IONS-SCNC: 5 MMOL/L (ref 3–14)
AST SERPL W P-5'-P-CCNC: 16 U/L (ref 0–45)
BASOPHILS # BLD AUTO: 0.1 10E9/L (ref 0–0.2)
BASOPHILS NFR BLD AUTO: 0.8 %
BILIRUB SERPL-MCNC: 0.3 MG/DL (ref 0.2–1.3)
BUN SERPL-MCNC: 16 MG/DL (ref 7–30)
CALCIUM SERPL-MCNC: 9.1 MG/DL (ref 8.5–10.1)
CHLORIDE SERPL-SCNC: 101 MMOL/L (ref 94–109)
CO2 SERPL-SCNC: 28 MMOL/L (ref 20–32)
CREAT SERPL-MCNC: 0.83 MG/DL (ref 0.52–1.04)
DIFFERENTIAL METHOD BLD: ABNORMAL
EOSINOPHIL # BLD AUTO: 0.3 10E9/L (ref 0–0.7)
EOSINOPHIL NFR BLD AUTO: 2.9 %
ERYTHROCYTE [DISTWIDTH] IN BLOOD BY AUTOMATED COUNT: 12.6 % (ref 10–15)
GFR SERPL CREATININE-BSD FRML MDRD: 68 ML/MIN/1.7M2
GLUCOSE SERPL-MCNC: 110 MG/DL (ref 70–99)
HCT VFR BLD AUTO: 34.9 % (ref 35–47)
HGB BLD-MCNC: 11.4 G/DL (ref 11.7–15.7)
IMM GRANULOCYTES # BLD: 0 10E9/L (ref 0–0.4)
IMM GRANULOCYTES NFR BLD: 0.4 %
LYMPHOCYTES # BLD AUTO: 2.6 10E9/L (ref 0.8–5.3)
LYMPHOCYTES NFR BLD AUTO: 26.7 %
MCH RBC QN AUTO: 30.5 PG (ref 26.5–33)
MCHC RBC AUTO-ENTMCNC: 32.7 G/DL (ref 31.5–36.5)
MCV RBC AUTO: 93 FL (ref 78–100)
MONOCYTES # BLD AUTO: 0.7 10E9/L (ref 0–1.3)
MONOCYTES NFR BLD AUTO: 7.7 %
NEUTROPHILS # BLD AUTO: 5.9 10E9/L (ref 1.6–8.3)
NEUTROPHILS NFR BLD AUTO: 61.5 %
NRBC # BLD AUTO: 0 10*3/UL
NRBC BLD AUTO-RTO: 0 /100
PLATELET # BLD AUTO: 246 10E9/L (ref 150–450)
POTASSIUM SERPL-SCNC: 3.8 MMOL/L (ref 3.4–5.3)
PROT SERPL-MCNC: 7.1 G/DL (ref 6.8–8.8)
RBC # BLD AUTO: 3.74 10E12/L (ref 3.8–5.2)
SODIUM SERPL-SCNC: 135 MMOL/L (ref 133–144)
WBC # BLD AUTO: 9.6 10E9/L (ref 4–11)

## 2017-07-20 PROCEDURE — 85025 COMPLETE CBC W/AUTO DIFF WBC: CPT | Performed by: INTERNAL MEDICINE

## 2017-07-20 PROCEDURE — 99212 OFFICE O/P EST SF 10 MIN: CPT | Mod: ZF

## 2017-07-20 PROCEDURE — 36415 COLL VENOUS BLD VENIPUNCTURE: CPT

## 2017-07-20 PROCEDURE — 99214 OFFICE O/P EST MOD 30 MIN: CPT | Mod: ZP | Performed by: PHYSICIAN ASSISTANT

## 2017-07-20 PROCEDURE — 80053 COMPREHEN METABOLIC PANEL: CPT | Performed by: INTERNAL MEDICINE

## 2017-07-20 ASSESSMENT — PAIN SCALES - GENERAL: PAINLEVEL: NO PAIN (0)

## 2017-07-20 NOTE — PROGRESS NOTES
DIAGNOSIS:  Clinical stage II infiltrating ductal carcinoma of the left breast. The patient had noticed a mass in her left breast and went in for evaluation. Mammogram on 09/13/2013 revealed a 2.5 x 2.0 x 0.8 cm left breast mass. Ultrasound-guided biopsy on 09/17/2013 revealed infiltrating ductal carcinoma, Kirvin grade 3. Tumor was weakly to moderately positive for estrogen receptors and weakly positive for progesterone receptors. HER-2 was amplified. MRI of the breasts revealed a 1.6 x 1.6 x 1.6 cm mass in her left breast and her tumor was too small for the I-SPY-2 clinical trial. She did begin neoadjuvant treatment with weekly Taxol and Herceptin on 10/18/2013 and completed 12 doses on 01/03/2013. She started Adriamycin/Cytoxan (AC) on 01/09/2014 and completed 4 cycles on 02/20/2014. She went on to have a left lumpectomy with left sentinel lymph node biopsy on 03/17/2014. There was no residual cancer. Zero out of 1 lymph nodes was positive. She restarted Herceptin on 04/02/2014 and completed her year on 12/09/2014. She completed left-sided breast radiation receiving 6000 cGy on 05/23/2014. She started letrozole in 03/2014.    INTERVAL HISTORY:  Ms. Uribe returns to the clinic today for follow-up for her breast carcinoma. She continues on the letrozole. She has stable joint pains. She continues to smoke cigarettes and is not interested in quitting. She states she is eating and drinking fine. She denies chest pain, shortness of breath, cough, nausea, vomiting, abdominal pain, new bleeding bone pains, headaches.     MEDICATIONS:   Current Outpatient Prescriptions   Medication Sig Dispense Refill     metFORMIN (GLUCOPHAGE) 500 MG tablet Take 1 tablet (500 mg) by mouth 2 times daily (with meals) 60 tablet 5     metoprolol (TOPROL-XL) 50 MG 24 hr tablet Take 1 tablet (50 mg) by mouth daily 30 tablet 11     losartan (COZAAR) 100 MG tablet Take 1 tablet (100 mg) by mouth daily 90 tablet 1     simvastatin (ZOCOR) 20  "MG tablet Take 1 tablet (20 mg) by mouth daily 90 tablet 1     hydrochlorothiazide (MICROZIDE) 12.5 MG capsule Take 1 capsule (12.5 mg) by mouth daily 90 capsule 3     letrozole (FEMARA) 2.5 MG tablet Take 1 tablet (2.5 mg) by mouth daily 90 tablet 3     Multiple Vitamins-Minerals (DIASENSE MULTIVITAMIN PO) Take 1 tablet by mouth daily       ascorbic acid (VITAMIN C) 250 MG CHEW Take 500 mg by mouth daily        Acetaminophen (TYLENOL PO) Take by mouth as needed 500 mg as needed       Calcium Carbonate (CALCIUM 600 PO) Take 1 tablet by mouth 2 times daily        Cholecalciferol (VITAMIN D) 1000 UNITS capsule Take 2 capsules by mouth daily            ALLERGIES:    Allergies   Allergen Reactions     Aspirin GI Disturbance     Dust Mites      And pollen       PHYSICAL EXAMINATION:  Vitals: /76  Pulse 79  Temp 98.6  F (37  C) (Oral)  Resp 16  Ht 1.659 m (5' 5.32\")  Wt 51.1 kg (112 lb 9.6 oz)  LMP  (LMP Unknown)  SpO2 97%  BMI 18.56 kg/m2  GENERAL:  A pleasant person in no acute distress.   HEENT:  Sclerae are nonicteric.   NECK:  Supple.   LYMPH NODES:  No peripheral lymphadenopathy noted in the axillary, supraclavicular, or cervical regions.   LUNGS:  Clear to auscultation bilaterally.   HEART:  Regular rate and rhythm, with no murmur appreciated.   ABDOMEN:  Bowel sounds are active.  Soft and nontender.  No hepatosplenomegaly or other masses appreciated.  LOWER EXTREMITIES:  Without pitting edema to the knees bilaterally.   NEUROLOGICAL:  Alert/orientated/able to answer all questions.  CN grossly intact.  BREAST: Right breast normal to inspection, no masses. Left breast, well healed surgical incision in the 10-12 o'clock position, no masses.     LAB:      7/20/2017 13:32   Sodium 135   Potassium 3.8   Chloride 101   Carbon Dioxide 28   Urea Nitrogen 16   Creatinine 0.83   GFR Estimate 68   GFR Estimate If Black 83   Calcium 9.1   Anion Gap 5   Albumin 3.8   Protein Total 7.1   Bilirubin Total 0.3 "   Alkaline Phosphatase 75   ALT 24   AST 16   Glucose 110 (H)   WBC 9.6   Hemoglobin 11.4 (L)   Hematocrit 34.9 (L)   Platelet Count 246   RBC Count 3.74 (L)   MCV 93   MCH 30.5   MCHC 32.7   RDW 12.6   Diff Method Automated Method   % Neutrophils 61.5   % Lymphocytes 26.7   % Monocytes 7.7   % Eosinophils 2.9   % Basophils 0.8   % Immature Granulocytes 0.4   Nucleated RBCs 0   Absolute Neutrophil 5.9   Absolute Lymphocytes 2.6   Absolute Monocytes 0.7   Absolute Eosinophils 0.3   Absolute Basophils 0.1   Abs Immature Granulocytes 0.0   Absolute Nucleated RBC 0.0     IMPRESSION/PLAN:   1.  Clinical stage II infiltrating ductal carcinoma of the left breast, weakly/moderately ER positive, HER-2 positive. Ms. Uribe continues to do well at this time. She is not having any signs or symptoms that would suggest recurrence of her breast carcinoma. Plan to continue letrozole daily. She will follow-up with Dr. Esparza in October with her mammogram. She understands the benefits of cardiovascular exercise and she remains active with walking at work.  2.  Bone health. Bone density in 2014 was consistent with a T score of -2.4. She declined any intervention and does not want to repeat the bone density scans. She understands there is potential bone loss with letrozole.I  3. History of smoking.  Ms. Uribe continues to smoke and does not want to quit. She states that she has smoked since the age of 25. I explained to her that she does fit criteria for yearly CT scans for screening for lung cancer and she is not interested.     If there are no interval concerns, she will follow-up in October with her mammogram. At that time she will be over 3 years from her surgery and her visits could be moved out to every 6 months.

## 2017-07-20 NOTE — NURSING NOTE
"Oncology Rooming Note    July 20, 2017 2:06 PM   Radha Uribe is a 67 year old female who presents for:    Chief Complaint   Patient presents with     Labs Only     venipunctre, vitals checked     Oncology Clinic Visit     Breast Ca- 3 month F/U     Initial Vitals: /76  Pulse 79  Temp 98.6  F (37  C) (Oral)  Resp 16  Ht 1.659 m (5' 5.32\")  Wt 51.1 kg (112 lb 9.6 oz)  LMP  (LMP Unknown)  SpO2 97%  BMI 18.56 kg/m2 Estimated body mass index is 18.56 kg/(m^2) as calculated from the following:    Height as of this encounter: 1.659 m (5' 5.32\").    Weight as of this encounter: 51.1 kg (112 lb 9.6 oz). Body surface area is 1.53 meters squared.  No Pain (0) Comment: Data Unavailable   No LMP recorded (lmp unknown). Patient has had a hysterectomy.  Allergies reviewed: Yes  Medications reviewed: Yes    Medications: Medication refills not needed today.  Pharmacy name entered into [employer]: [id number] [place], [place] - [address] [place] [address]    Clinical concerns: no clinical concerns  provider was notified.    7 minutes for nursing intake (face to face time)     Margi Bunch CMA              "

## 2017-07-20 NOTE — LETTER
7/20/2017      RE: Radha Uribe  [address]  [facility] [address]       DIAGNOSIS:  Clinical stage II infiltrating ductal carcinoma of the left breast. The patient had noticed a mass in her left breast and went in for evaluation. Mammogram on 09/13/2013 revealed a 2.5 x 2.0 x 0.8 cm left breast mass. Ultrasound-guided biopsy on 09/17/2013 revealed infiltrating ductal carcinoma, Daylin grade 3. Tumor was weakly to moderately positive for estrogen receptors and weakly positive for progesterone receptors. HER-2 was amplified. MRI of the breasts revealed a 1.6 x 1.6 x 1.6 cm mass in her left breast and her tumor was too small for the I-SPY-2 clinical trial. She did begin neoadjuvant treatment with weekly Taxol and Herceptin on 10/18/2013 and completed 12 doses on 01/03/2013. She started Adriamycin/Cytoxan (AC) on 01/09/2014 and completed 4 cycles on 02/20/2014. She went on to have a left lumpectomy with left sentinel lymph node biopsy on 03/17/2014. There was no residual cancer. Zero out of 1 lymph nodes was positive. She restarted Herceptin on 04/02/2014 and completed her year on 12/09/2014. She completed left-sided breast radiation receiving 6000 cGy on 05/23/2014. She started letrozole in 03/2014.    INTERVAL HISTORY:  Ms. Uribe returns to the clinic today for follow-up for her breast carcinoma. She continues on the letrozole. She has stable joint pains. She continues to smoke cigarettes and is not interested in quitting. She states she is eating and drinking fine. She denies chest pain, shortness of breath, cough, nausea, vomiting, abdominal pain, new bleeding bone pains, headaches.     MEDICATIONS:   Current Outpatient Prescriptions   Medication Sig Dispense Refill     metFORMIN (GLUCOPHAGE) 500 MG tablet Take 1 tablet (500 mg) by mouth 2 times daily (with meals) 60 tablet 5     metoprolol (TOPROL-XL) 50 MG 24 hr tablet Take 1 tablet (50 mg) by mouth daily 30 tablet 11     losartan (COZAAR) 100  "MG tablet Take 1 tablet (100 mg) by mouth daily 90 tablet 1     simvastatin (ZOCOR) 20 MG tablet Take 1 tablet (20 mg) by mouth daily 90 tablet 1     hydrochlorothiazide (MICROZIDE) 12.5 MG capsule Take 1 capsule (12.5 mg) by mouth daily 90 capsule 3     letrozole (FEMARA) 2.5 MG tablet Take 1 tablet (2.5 mg) by mouth daily 90 tablet 3     Multiple Vitamins-Minerals (DIASENSE MULTIVITAMIN PO) Take 1 tablet by mouth daily       ascorbic acid (VITAMIN C) 250 MG CHEW Take 500 mg by mouth daily        Acetaminophen (TYLENOL PO) Take by mouth as needed 500 mg as needed       Calcium Carbonate (CALCIUM 600 PO) Take 1 tablet by mouth 2 times daily        Cholecalciferol (VITAMIN D) 1000 UNITS capsule Take 2 capsules by mouth daily            ALLERGIES:    Allergies   Allergen Reactions     Aspirin GI Disturbance     Dust Mites      And pollen       PHYSICAL EXAMINATION:  Vitals: /76  Pulse 79  Temp 98.6  F (37  C) (Oral)  Resp 16  Ht 1.659 m (5' 5.32\")  Wt 51.1 kg (112 lb 9.6 oz)  LMP  (LMP Unknown)  SpO2 97%  BMI 18.56 kg/m2  GENERAL:  A pleasant person in no acute distress.   HEENT:  Sclerae are nonicteric.   NECK:  Supple.   LYMPH NODES:  No peripheral lymphadenopathy noted in the axillary, supraclavicular, or cervical regions.   LUNGS:  Clear to auscultation bilaterally.   HEART:  Regular rate and rhythm, with no murmur appreciated.   ABDOMEN:  Bowel sounds are active.  Soft and nontender.  No hepatosplenomegaly or other masses appreciated.  LOWER EXTREMITIES:  Without pitting edema to the knees bilaterally.   NEUROLOGICAL:  Alert/orientated/able to answer all questions.  CN grossly intact.  BREAST: Right breast normal to inspection, no masses. Left breast, well healed surgical incision in the 10-12 o'clock position, no masses.     LAB:      7/20/2017 13:32   Sodium 135   Potassium 3.8   Chloride 101   Carbon Dioxide 28   Urea Nitrogen 16   Creatinine 0.83   GFR Estimate 68   GFR Estimate If Black 83 "   Calcium 9.1   Anion Gap 5   Albumin 3.8   Protein Total 7.1   Bilirubin Total 0.3   Alkaline Phosphatase 75   ALT 24   AST 16   Glucose 110 (H)   WBC 9.6   Hemoglobin 11.4 (L)   Hematocrit 34.9 (L)   Platelet Count 246   RBC Count 3.74 (L)   MCV 93   MCH 30.5   MCHC 32.7   RDW 12.6   Diff Method Automated Method   % Neutrophils 61.5   % Lymphocytes 26.7   % Monocytes 7.7   % Eosinophils 2.9   % Basophils 0.8   % Immature Granulocytes 0.4   Nucleated RBCs 0   Absolute Neutrophil 5.9   Absolute Lymphocytes 2.6   Absolute Monocytes 0.7   Absolute Eosinophils 0.3   Absolute Basophils 0.1   Abs Immature Granulocytes 0.0   Absolute Nucleated RBC 0.0     IMPRESSION/PLAN:   1.  Clinical stage II infiltrating ductal carcinoma of the left breast, weakly/moderately ER positive, HER-2 positive. Ms. Uribe continues to do well at this time. She is not having any signs or symptoms that would suggest recurrence of her breast carcinoma. Plan to continue letrozole daily. She will follow-up with Dr. Esparza in October with her mammogram. She understands the benefits of cardiovascular exercise and she remains active with walking at work.  2.  Bone health. Bone density in 2014 was consistent with a T score of -2.4. She declined any intervention and does not want to repeat the bone density scans. She understands there is potential bone loss with letrozole.I  3. History of smoking.  Ms. Uribe continues to smoke and does not want to quit. She states that she has smoked since the age of 25. I explained to her that she does fit criteria for yearly CT scans for screening for lung cancer and she is not interested.     If there are no interval concerns, she will follow-up in October with her mammogram. At that time she will be over 3 years from her surgery and her visits could be moved out to every 6 months.             Michelle Coker PA-C

## 2017-07-20 NOTE — LETTER
7/20/2017      RE: Radha Uribe  [address]  [facility] [address]       DIAGNOSIS:  Clinical stage II infiltrating ductal carcinoma of the left breast. The patient had noticed a mass in her left breast and went in for evaluation. Mammogram on 09/13/2013 revealed a 2.5 x 2.0 x 0.8 cm left breast mass. Ultrasound-guided biopsy on 09/17/2013 revealed infiltrating ductal carcinoma, Daylin grade 3. Tumor was weakly to moderately positive for estrogen receptors and weakly positive for progesterone receptors. HER-2 was amplified. MRI of the breasts revealed a 1.6 x 1.6 x 1.6 cm mass in her left breast and her tumor was too small for the I-SPY-2 clinical trial. She did begin neoadjuvant treatment with weekly Taxol and Herceptin on 10/18/2013 and completed 12 doses on 01/03/2013. She started Adriamycin/Cytoxan (AC) on 01/09/2014 and completed 4 cycles on 02/20/2014. She went on to have a left lumpectomy with left sentinel lymph node biopsy on 03/17/2014. There was no residual cancer. Zero out of 1 lymph nodes was positive. She restarted Herceptin on 04/02/2014 and completed her year on 12/09/2014. She completed left-sided breast radiation receiving 6000 cGy on 05/23/2014. She started letrozole in 03/2014.    INTERVAL HISTORY:  Ms. Uribe returns to the clinic today for follow-up for her breast carcinoma. She continues on the letrozole. She has stable joint pains. She continues to smoke cigarettes and is not interested in quitting. She states she is eating and drinking fine. She denies chest pain, shortness of breath, cough, nausea, vomiting, abdominal pain, new bleeding bone pains, headaches.     MEDICATIONS:   Current Outpatient Prescriptions   Medication Sig Dispense Refill     metFORMIN (GLUCOPHAGE) 500 MG tablet Take 1 tablet (500 mg) by mouth 2 times daily (with meals) 60 tablet 5     metoprolol (TOPROL-XL) 50 MG 24 hr tablet Take 1 tablet (50 mg) by mouth daily 30 tablet 11     losartan (COZAAR) 100  "MG tablet Take 1 tablet (100 mg) by mouth daily 90 tablet 1     simvastatin (ZOCOR) 20 MG tablet Take 1 tablet (20 mg) by mouth daily 90 tablet 1     hydrochlorothiazide (MICROZIDE) 12.5 MG capsule Take 1 capsule (12.5 mg) by mouth daily 90 capsule 3     letrozole (FEMARA) 2.5 MG tablet Take 1 tablet (2.5 mg) by mouth daily 90 tablet 3     Multiple Vitamins-Minerals (DIASENSE MULTIVITAMIN PO) Take 1 tablet by mouth daily       ascorbic acid (VITAMIN C) 250 MG CHEW Take 500 mg by mouth daily        Acetaminophen (TYLENOL PO) Take by mouth as needed 500 mg as needed       Calcium Carbonate (CALCIUM 600 PO) Take 1 tablet by mouth 2 times daily        Cholecalciferol (VITAMIN D) 1000 UNITS capsule Take 2 capsules by mouth daily            ALLERGIES:    Allergies   Allergen Reactions     Aspirin GI Disturbance     Dust Mites      And pollen       PHYSICAL EXAMINATION:  Vitals: /76  Pulse 79  Temp 98.6  F (37  C) (Oral)  Resp 16  Ht 1.659 m (5' 5.32\")  Wt 51.1 kg (112 lb 9.6 oz)  LMP  (LMP Unknown)  SpO2 97%  BMI 18.56 kg/m2  GENERAL:  A pleasant person in no acute distress.   HEENT:  Sclerae are nonicteric.   NECK:  Supple.   LYMPH NODES:  No peripheral lymphadenopathy noted in the axillary, supraclavicular, or cervical regions.   LUNGS:  Clear to auscultation bilaterally.   HEART:  Regular rate and rhythm, with no murmur appreciated.   ABDOMEN:  Bowel sounds are active.  Soft and nontender.  No hepatosplenomegaly or other masses appreciated.  LOWER EXTREMITIES:  Without pitting edema to the knees bilaterally.   NEUROLOGICAL:  Alert/orientated/able to answer all questions.  CN grossly intact.  BREAST: Right breast normal to inspection, no masses. Left breast, well healed surgical incision in the 10-12 o'clock position, no masses.     LAB:      7/20/2017 13:32   Sodium 135   Potassium 3.8   Chloride 101   Carbon Dioxide 28   Urea Nitrogen 16   Creatinine 0.83   GFR Estimate 68   GFR Estimate If Black 83 "   Calcium 9.1   Anion Gap 5   Albumin 3.8   Protein Total 7.1   Bilirubin Total 0.3   Alkaline Phosphatase 75   ALT 24   AST 16   Glucose 110 (H)   WBC 9.6   Hemoglobin 11.4 (L)   Hematocrit 34.9 (L)   Platelet Count 246   RBC Count 3.74 (L)   MCV 93   MCH 30.5   MCHC 32.7   RDW 12.6   Diff Method Automated Method   % Neutrophils 61.5   % Lymphocytes 26.7   % Monocytes 7.7   % Eosinophils 2.9   % Basophils 0.8   % Immature Granulocytes 0.4   Nucleated RBCs 0   Absolute Neutrophil 5.9   Absolute Lymphocytes 2.6   Absolute Monocytes 0.7   Absolute Eosinophils 0.3   Absolute Basophils 0.1   Abs Immature Granulocytes 0.0   Absolute Nucleated RBC 0.0     IMPRESSION/PLAN:   1.  Clinical stage II infiltrating ductal carcinoma of the left breast, weakly/moderately ER positive, HER-2 positive. Ms. Uribe continues to do well at this time. She is not having any signs or symptoms that would suggest recurrence of her breast carcinoma. Plan to continue letrozole daily. She will follow-up with Dr. Esparza in October with her mammogram. She understands the benefits of cardiovascular exercise and she remains active with walking at work.  2.  Bone health. Bone density in 2014 was consistent with a T score of -2.4. She declined any intervention and does not want to repeat the bone density scans. She understands there is potential bone loss with letrozole.I  3. History of smoking.  Ms. Uribe continues to smoke and does not want to quit. She states that she has smoked since the age of 25. I explained to her that she does fit criteria for yearly CT scans for screening for lung cancer and she is not interested.     If there are no interval concerns, she will follow-up in October with her mammogram. At that time she will be over 3 years from her surgery and her visits could be moved out to every 6 months.             Michelle Coker PA-C

## 2017-10-15 NOTE — PROGRESS NOTES
HISTORY OF PRESENT ILLNESS: Radha Uribe is a 67-year-old woman who was referred to our Oncology Clinic by Dr. Crespo for evaluation of a newly diagnosed breast cancer. Radha was in her usual health until last month, when she was asked to do a mammogram by her primary care doctor, Dr. Preston. She has not been doing mammogram for the last 20 years, and most of the time she does self breast examinations. She noticed a mass, which was not painful, in her left breast, and immediately went for a mammogram. The mammogram was done on 09/13/2013, which revealed a 2.5 x 2 x 0.8 cm mass, so she had an ultrasound-guided core needle biopsy on 09/17, which revealed infiltrating ductal carcinoma, Mosby grade 3 (score 3 for tubule formation, 3 for nuclear atypia, 3 for mitosis). No angiolymphatic invasion was identified. There was a focal necrosis of invasive carcinoma identified, and no definite carcinoma in situ was identified. The greatest length of the needle biopsy at the left breast in the 2 o'clock position showed benign breast tissue with fibrocystic changes. There is a focal intraductal microcalcification identified. The tumor was 15-20% weakly to moderately positive for estrogen receptors and 5% positive for progesterone receptors. HER-2 was amplified. The ratio of HER-2/CEP17 signals were more than 5.9 by FISH analysis. The signal/nucleus was more than 19.4. She was seen by Dr. Crespo at the Breast Clinic on 09/24, who recommended to do an MRI of the breast due to density of the breast tissues, as well as referred her to the Breast Oncology Clinic for further staging workup and management of her breast cancer. MRI of the breast showed BI-RADS 6 and mass in the left breast at the 11 o'clock position that measures about 1.6 x 1.6 x 1.6 cm. The longest diameter in sagittal reconstruction was 1.9 cm. There is no evidence of axillary lymph node involvement on the MRI.   She had neoadjuvant chemotherapy , mastectomy and SNL  "sampling which showed pathologic CR and no disease in the LN. This was followed by radiation therapy. She received 5000 cGy in 25 fractions, completed on 5/23/2014. She continued on treatment with herceptin for 13 cycles. This was completed on 12/9/2014.     FOLLOWUP NOTE: Oct 17, 2017        INTERVAL HISTORY:  Radha returns to the clinic.  She reports feeling well. She has had some URI symptoms, but these are improving. She continues to smoke and has no interest in quitting. Her energy level is good and she has no complaints of pain, anxiety, or depression.       REVIEW OF SYSTEMS:  She has some wheezing.  The remainder of a 10-point review of systems is negative.       PHYSICAL EXAMINATION:   VITAL SIGNS: /72  Pulse 79  Temp 98.2  F (36.8  C)  Resp 16  Ht 1.659 m (5' 5.32\")  Wt 53.1 kg (117 lb 1.6 oz)  LMP  (LMP Unknown)  SpO2 95%  BMI 19.3 kg/m2    GENERAL:  Radha appeared generally well.  HEENT:  Oropharynx is without lesions.   LYMPH:  There is no palpable cervical, supraclavicular, subclavicular or axillary lymphadenopathy.   BREASTS:  Examination of the right breast is without masses.  Examination of the left breast reveals a well-healed incision at the 11 o'clock position, 2 fingerbreadths from the nipple-areolar complex, without erythema or masses.  No masses in the left breast.  The left axillary incision is well-healed without erythema or masses.   LUNGS:  Clear to percussion and auscultation, except for wheezing in the right upper lobe.   HEART:  There is a regular rate and rhythm, and a 2/6 systolic ejection murmur heard best at left sternal border.   ABDOMEN:  Soft and nontender, without hepatosplenomegaly.   EXTREMITIES:  Without edema.   PSYCHIATRIC:  Mood and affect were normal.       LABORATORY DATA:  The CMP is within normal limits.  CBC showed hgb of 10.8 otherwise within normal limits.  Glucose was 106.       ASSESSMENT AND PLAN:     1.  Radha Uribe is a 67-year-old woman who was " diagnosed in 2013 with a stage IIA, T2 N2 MX, invasive ductal carcinoma of the left breast.  The tumor was positive for estrogen receptor in 15-20% of the cells, [place]-positive in 5% of the cells, and HER2 was amplified.  The left breast mass measured 2.5 x 2 x 0.8 cm in size at the 11 o'clock position of the left breast on the mammogram.  She was treated with neoadjuvant Taxol and Herceptin for 12 weeks followed by dose-dense AC for 4 cycles.  She had a complete pathologic response in the lumpectomy specimen.  She completed radiation in 04/2014.  She tolerated the treatment quite well.  She began letrozole, and has tolerated it very well with no hot flashes, myalgias or arthralgias.  She had a mammogram on 10/13/2016 which showed benign results.   2.  Letrozole therapy is recommended for 10 years given the outcome of the MA.17R trial.  We could consider the breast cancer index to reduce the length of time and treatment with letrozole from 10 years to 5 years, but on the other hand this has not been applied for HER2-positive breast cancers, and she is at somewhat increased risk of distant recurrence.   3.  Continue with mammography performed yearly.   4.  Thyroid mass.  She did have a thyroid biopsy, which showed benign results, and she will follow up with Dr. Hernandez. .   5.  DEXA scan showed osteopenia with a most valid negative T score of -2.4 on 04/30/2014.  Radha does not want to pursue this finding.  She does not want treatment with Reclast.   6. Smoking cessation broached.  She affirms refusal to discuss.   7.  Followup.  Radha will see Michelle Coker in followup in 6 months with SAHARA, and then in 12 months with me with a mammogram.  CBC, CMP with follow up visits. Follow up with SAHARA 4-17-18 with CBC, CMP.          Thank you for allowing us to continue to participate in Radha Uribe's care.  The patient was seen and evaluated by me.  I discussed the patient with the resident and agree with the findings and  plan in the note, which was edited by me.    Sincerely,     South Esparza MD    [facility]  [phone].      Alexis Ventura MD  Resident, Radiation Oncology

## 2017-10-17 ENCOUNTER — APPOINTMENT (OUTPATIENT)
Dept: LAB | Facility: CLINIC | Age: 67
End: 2017-10-17
Attending: INTERNAL MEDICINE
Payer: COMMERCIAL

## 2017-10-17 ENCOUNTER — ONCOLOGY VISIT (OUTPATIENT)
Dept: ONCOLOGY | Facility: CLINIC | Age: 67
End: 2017-10-17
Attending: INTERNAL MEDICINE
Payer: COMMERCIAL

## 2017-10-17 VITALS
RESPIRATION RATE: 16 BRPM | WEIGHT: 117.1 LBS | DIASTOLIC BLOOD PRESSURE: 72 MMHG | SYSTOLIC BLOOD PRESSURE: 151 MMHG | HEIGHT: 65 IN | BODY MASS INDEX: 19.51 KG/M2 | TEMPERATURE: 98.2 F | OXYGEN SATURATION: 95 % | HEART RATE: 79 BPM

## 2017-10-17 DIAGNOSIS — C50.212 CARCINOMA OF UPPER-INNER QUADRANT OF LEFT FEMALE BREAST (H): ICD-10-CM

## 2017-10-17 LAB
ALBUMIN SERPL-MCNC: 3.7 G/DL (ref 3.4–5)
ALP SERPL-CCNC: 76 U/L (ref 40–150)
ALT SERPL W P-5'-P-CCNC: 27 U/L (ref 0–50)
ANION GAP SERPL CALCULATED.3IONS-SCNC: 10 MMOL/L (ref 3–14)
AST SERPL W P-5'-P-CCNC: 18 U/L (ref 0–45)
BASOPHILS # BLD AUTO: 0.1 10E9/L (ref 0–0.2)
BASOPHILS NFR BLD AUTO: 1 %
BILIRUB SERPL-MCNC: 0.5 MG/DL (ref 0.2–1.3)
BUN SERPL-MCNC: 27 MG/DL (ref 7–30)
CALCIUM SERPL-MCNC: 9.2 MG/DL (ref 8.5–10.1)
CHLORIDE SERPL-SCNC: 98 MMOL/L (ref 94–109)
CO2 SERPL-SCNC: 25 MMOL/L (ref 20–32)
CREAT SERPL-MCNC: 1.02 MG/DL (ref 0.52–1.04)
DIFFERENTIAL METHOD BLD: ABNORMAL
EOSINOPHIL # BLD AUTO: 0.2 10E9/L (ref 0–0.7)
EOSINOPHIL NFR BLD AUTO: 3 %
ERYTHROCYTE [DISTWIDTH] IN BLOOD BY AUTOMATED COUNT: 12.6 % (ref 10–15)
GFR SERPL CREATININE-BSD FRML MDRD: 54 ML/MIN/1.7M2
GLUCOSE SERPL-MCNC: 106 MG/DL (ref 70–99)
HCT VFR BLD AUTO: 33.4 % (ref 35–47)
HGB BLD-MCNC: 10.8 G/DL (ref 11.7–15.7)
IMM GRANULOCYTES # BLD: 0 10E9/L (ref 0–0.4)
IMM GRANULOCYTES NFR BLD: 0.4 %
LYMPHOCYTES # BLD AUTO: 1.7 10E9/L (ref 0.8–5.3)
LYMPHOCYTES NFR BLD AUTO: 23.6 %
MCH RBC QN AUTO: 30 PG (ref 26.5–33)
MCHC RBC AUTO-ENTMCNC: 32.3 G/DL (ref 31.5–36.5)
MCV RBC AUTO: 93 FL (ref 78–100)
MONOCYTES # BLD AUTO: 1 10E9/L (ref 0–1.3)
MONOCYTES NFR BLD AUTO: 13.6 %
NEUTROPHILS # BLD AUTO: 4.1 10E9/L (ref 1.6–8.3)
NEUTROPHILS NFR BLD AUTO: 58.4 %
NRBC # BLD AUTO: 0 10*3/UL
NRBC BLD AUTO-RTO: 0 /100
PLATELET # BLD AUTO: 224 10E9/L (ref 150–450)
POTASSIUM SERPL-SCNC: 3.7 MMOL/L (ref 3.4–5.3)
PROT SERPL-MCNC: 7.1 G/DL (ref 6.8–8.8)
RBC # BLD AUTO: 3.6 10E12/L (ref 3.8–5.2)
SODIUM SERPL-SCNC: 134 MMOL/L (ref 133–144)
WBC # BLD AUTO: 7 10E9/L (ref 4–11)

## 2017-10-17 PROCEDURE — 80053 COMPREHEN METABOLIC PANEL: CPT | Performed by: INTERNAL MEDICINE

## 2017-10-17 PROCEDURE — 85025 COMPLETE CBC W/AUTO DIFF WBC: CPT | Performed by: INTERNAL MEDICINE

## 2017-10-17 PROCEDURE — 36415 COLL VENOUS BLD VENIPUNCTURE: CPT

## 2017-10-17 PROCEDURE — 99213 OFFICE O/P EST LOW 20 MIN: CPT | Mod: GC | Performed by: INTERNAL MEDICINE

## 2017-10-17 PROCEDURE — 99213 OFFICE O/P EST LOW 20 MIN: CPT

## 2017-10-17 PROCEDURE — 99212 OFFICE O/P EST SF 10 MIN: CPT | Mod: ZF

## 2017-10-17 ASSESSMENT — PAIN SCALES - GENERAL: PAINLEVEL: NO PAIN (0)

## 2017-10-17 NOTE — NURSING NOTE
"Oncology Rooming Note    October 17, 2017 4:11 PM   Radha Uribe is a 67 year old female who presents for:    Chief Complaint   Patient presents with     Blood Draw     VS done, labs collected via venipuncture.  Hx breast CA.     Oncology Clinic Visit     Follow up-Breast CA     Initial Vitals: /72  Pulse 79  Temp 98.2  F (36.8  C)  Resp 16  Ht 1.659 m (5' 5.32\")  Wt 53.1 kg (117 lb 1.6 oz)  LMP  (LMP Unknown)  SpO2 95%  BMI 19.3 kg/m2 Estimated body mass index is 19.3 kg/(m^2) as calculated from the following:    Height as of this encounter: 1.659 m (5' 5.32\").    Weight as of this encounter: 53.1 kg (117 lb 1.6 oz). Body surface area is 1.56 meters squared.  No Pain (0) Comment: Data Unavailable   No LMP recorded (lmp unknown). Patient has had a hysterectomy.  Allergies reviewed: Yes  Medications reviewed: Yes    Medications: Medication refills not needed today.  Pharmacy name entered into [employer]: [id number] [place], [place] - [address] [place] [address]    Clinical concerns: Lab and Mammogram Results Dr. Esparza was notified.    10 minutes for nursing intake (face to face time)     Nya Posada LPN              "

## 2017-10-17 NOTE — MR AVS SNAPSHOT
After Visit Summary   10/17/2017    Radha Uribe    MRN: [id number]           Patient Information     Date Of Birth          1950        Visit Information        Provider Department      10/17/2017 3:30 PM South Esparza MD [facility]        Today's Diagnoses     Carcinoma of upper-inner quadrant of left female breast (H)           Follow-ups after your visit        Follow-up notes from your care team     Return in about 6 months (around 4/17/2018).      Your next 10 appointments already scheduled     Apr 17, 2018  2:45 PM CDT   [facility] Draw with  [facility] LAB DRAW   [facility] Lab Draw ([facility])    [address] [place] [id number] [phone]            Apr 17, 2018  3:20 PM CDT   (Arrive by 3:05 PM)   Return Visit with Nydia Bernard PA-C   [facility] ([facility])    [address] [place] [id number] [phone]              Future tests that were ordered for you today     Open Standing Orders        Priority Remaining Interval Expires Ordered    CBC with platelets differential Routine 52/52  10/17/2018 10/17/2017    Comprehensive metabolic panel Routine 52/52  10/17/2018 10/17/2017            Who to contact     If you have questions or need follow up information about today's clinic visit or your schedule please contact [facility] directly at [phone].  Normal or non-critical lab and imaging results will be communicated to you by MyChart, letter or phone within 4 business days after the clinic has received the results. If you do not hear from us within 7 days, please contact the clinic through MyChart or phone. If you have a critical or abnormal lab result, we will notify you by phone as soon as possible.  Submit refill requests through [employer] or call your pharmacy and they will forward  "the refill request to us. Please allow 3 business days for your refill to be completed.          Additional Information About Your Visit        [employer] Information     [employer] gives you secure access to your electronic health record. If you see a primary care provider, you can also send messages to your care team and make appointments. If you have questions, please call your primary care clinic.  If you do not have a primary care provider, please call [phone] and they will assist you.        Care EveryWhere ID     This is your Care EveryWhere ID. This could be used by other organizations to access your [facility] medical records  [id number]        Your Vitals Were     Pulse Temperature Respirations Height Last Period Pulse Oximetry    79 98.2  F (36.8  C) 16 1.659 m (5' 5.32\") (LMP Unknown) 95%    BMI (Body Mass Index)                   19.3 kg/m2            Blood Pressure from Last 3 Encounters:   10/17/17 151/72   07/20/17 158/76   05/10/17 156/84    Weight from Last 3 Encounters:   10/17/17 53.1 kg (117 lb 1.6 oz)   07/20/17 51.1 kg (112 lb 9.6 oz)   05/10/17 51.5 kg (113 lb 8 oz)              We Performed the Following     CBC with platelets differential     Comprehensive metabolic panel        Primary Care Provider Office Phone # Fax #    Kodak Preston [phone] [address] [facility] [address]        Equal Access to Services     [facility]: Hadii aad ku hadasho Soroseann, waaxda luqadaha, qaybta kaalmada kumaryada, blanca rose . So [facility] [phone].    ATENCIÓN: Si habla español, tiene a eugene disposición servicios gratuitos de asistencia lingüística. Llame al [phone].    We comply with applicable federal civil rights laws and [place] laws. We do not discriminate on the basis of race, color, national origin, age, disability, sex, sexual orientation, or gender identity.            Thank you!     Thank you for choosing [facility]  for your care. Our goal is always to provide you with excellent care. Hearing back from our patients is one way we can continue to improve our services. Please take a few minutes to complete the written survey that you may receive in the mail after your visit with us. Thank you!             Your Updated Medication List - Protect others around you: Learn how to safely use, store and throw away your medicines at www.disposemymeds.org.          This list is accurate as of: 10/17/17 11:59 PM.  Always use your most recent med list.                   Brand Name Dispense Instructions for use Diagnosis    ascorbic acid 250 MG Chew chewable tablet    vitamin C     Take 500 mg by mouth daily        CALCIUM 600 PO      Take 1 tablet by mouth 2 times daily        DIASENSE MULTIVITAMIN PO      Take 1 tablet by mouth daily        hydrochlorothiazide 12.5 MG capsule    MICROZIDE    90 capsule    Take 1 capsule (12.5 mg) by mouth daily    Hypertension goal BP (blood pressure) < 140/90       letrozole 2.5 MG tablet    FEMARA    90 tablet    Take 1 tablet (2.5 mg) by mouth daily    Breast cancer of upper-inner quadrant of left female breast (H)       losartan 100 MG tablet    COZAAR    90 tablet    Take 1 tablet (100 mg) by mouth daily    Essential hypertension, benign       metFORMIN 500 MG tablet    GLUCOPHAGE    60 tablet    Take 1 tablet (500 mg) by mouth 2 times daily (with meals)    Type 2 diabetes mellitus without complication, without long-term current use of insulin (H)       metoprolol 50 MG 24 hr tablet    TOPROL-XL    30 tablet    Take 1 tablet (50 mg) by mouth daily    Essential hypertension, benign       simvastatin 20 MG tablet    ZOCOR    90 tablet    Take 1 tablet (20 mg) by mouth daily    Hypercholesterolemia       TYLENOL PO      Take by mouth as needed 500 mg as needed        vitamin D 1000 UNITS capsule      Take 2 capsules by mouth daily

## 2017-10-17 NOTE — NURSING NOTE
Chief Complaint   Patient presents with     Blood Draw     VS done, labs collected via venipuncture.  Hx breast CA.

## 2017-11-10 ENCOUNTER — TELEPHONE (OUTPATIENT)
Dept: FAMILY MEDICINE | Facility: CLINIC | Age: 67
End: 2017-11-10

## 2017-11-10 NOTE — LETTER
November 10, 2017    Radha Uribe  [address]  [facility] [address]    Dear Augusto Garcia cares about your health and your health plan.  I have reviewed your medical conditions, medication list and lab results, and am making recommendations based on this review to better manage your health.    You are in particular need of attention regarding:  -Cholesterol  -Diabetes  -High Blood Pressure    I am recommending that you:     -schedule a FOLLOWUP OFFICE APPOINTMENT with me.  I will recheck your: A1c and Lipids (fasting cholesterol - nothing to eat except water and/or meds for 8+ hours) tests.      Please call us at the [place] location:  [phone] or use [employer] to address the above recommendations.     Thank you for trusting [facility].  We appreciate the opportunity to serve you and look forward to supporting your healthcare in the future.    If you have (or plan to have) any of these tests done at a facility other than a [facility] or a [facility], please have the results sent to the [facility] location noted above.      Best Regards,    Kodak Preston MD

## 2017-11-10 NOTE — TELEPHONE ENCOUNTER
Panel Management Review      Patient has the following on her problem list:     Diabetes    ASA: Not Required     Last A1C  Lab Results   Component Value Date    A1C 6.2 05/10/2017    A1C 6.5 05/18/2016    A1C 6.3 10/13/2015    A1C 5.2 04/01/2015    A1C 5.7 09/24/2014     A1C tested: MONITOR    Last LDL:    Lab Results   Component Value Date    CHOL 187 01/19/2012     Lab Results   Component Value Date    HDL 48 01/19/2012     Lab Results   Component Value Date     05/10/2017     Lab Results   Component Value Date    TRIG 68 01/19/2012     No results found for: CHOLHDLRATIO  No results found for: NHDL    Is the patient on a Statin? YES             Is the patient on Aspirin? NO    Medications     HMG CoA Reductase Inhibitors    simvastatin (ZOCOR) 20 MG tablet          Last three blood pressure readings:  BP Readings from Last 3 Encounters:   10/17/17 151/72   07/20/17 158/76   05/10/17 156/84       Date of last diabetes office visit: 5/10/17     Tobacco History:     History   Smoking Status     Current Every Day Smoker     Packs/day: 0.30   Smokeless Tobacco     Never Used         Hypertension   Last three blood pressure readings:  BP Readings from Last 3 Encounters:   10/17/17 151/72   07/20/17 158/76   05/10/17 156/84     Blood pressure: FAILED    HTN Guidelines:  Age 18-59 BP range:  Less than 140/90  Age 60-85 with Diabetes:  Less than 140/90  Age 60-85 without Diabetes:  less than 150/90          Composite cancer screening  Chart review shows that this patient is due/due soon for the following None  Summary:    Patient is due/failing the following:   A1C, BP CHECK and FOLLOW UP    Action needed:   Patient needs office visit for follow up.    Type of outreach:    Sent letter.    Questions for provider review:    None                                                                                                                                    Princess CHRISTIANO Malloy CMA       Chart routed to none  .

## 2017-12-06 ENCOUNTER — OFFICE VISIT (OUTPATIENT)
Dept: FAMILY MEDICINE | Facility: CLINIC | Age: 67
End: 2017-12-06
Payer: COMMERCIAL

## 2017-12-06 VITALS
WEIGHT: 115.5 LBS | TEMPERATURE: 98.1 F | HEART RATE: 85 BPM | OXYGEN SATURATION: 96 % | SYSTOLIC BLOOD PRESSURE: 162 MMHG | RESPIRATION RATE: 16 BRPM | BODY MASS INDEX: 19.03 KG/M2 | DIASTOLIC BLOOD PRESSURE: 92 MMHG

## 2017-12-06 DIAGNOSIS — E78.00 HYPERCHOLESTEROLEMIA: ICD-10-CM

## 2017-12-06 DIAGNOSIS — E11.9 TYPE 2 DIABETES MELLITUS WITHOUT COMPLICATION, WITHOUT LONG-TERM CURRENT USE OF INSULIN (H): ICD-10-CM

## 2017-12-06 DIAGNOSIS — E11.9 CONTROLLED TYPE 2 DIABETES MELLITUS WITHOUT COMPLICATION, WITHOUT LONG-TERM CURRENT USE OF INSULIN (H): Primary | Chronic | ICD-10-CM

## 2017-12-06 DIAGNOSIS — I10 ESSENTIAL HYPERTENSION, BENIGN: Chronic | ICD-10-CM

## 2017-12-06 LAB
CREAT UR-MCNC: 50 MG/DL
HBA1C MFR BLD: 6.1 % (ref 4.3–6)
MICROALBUMIN UR-MCNC: 12 MG/L
MICROALBUMIN/CREAT UR: 22.97 MG/G CR (ref 0–25)

## 2017-12-06 PROCEDURE — 82043 UR ALBUMIN QUANTITATIVE: CPT | Performed by: FAMILY MEDICINE

## 2017-12-06 PROCEDURE — 99214 OFFICE O/P EST MOD 30 MIN: CPT | Performed by: FAMILY MEDICINE

## 2017-12-06 PROCEDURE — 83036 HEMOGLOBIN GLYCOSYLATED A1C: CPT | Performed by: FAMILY MEDICINE

## 2017-12-06 RX ORDER — METOPROLOL SUCCINATE 100 MG/1
100 TABLET, EXTENDED RELEASE ORAL DAILY
Qty: 90 TABLET | Refills: 1 | Status: SHIPPED | OUTPATIENT
Start: 2017-12-06 | End: 2018-06-19

## 2017-12-06 RX ORDER — SIMVASTATIN 20 MG
20 TABLET ORAL DAILY
Qty: 90 TABLET | Refills: 1 | Status: SHIPPED | OUTPATIENT
Start: 2017-12-06 | End: 2019-10-09

## 2017-12-06 RX ORDER — LOSARTAN POTASSIUM 100 MG/1
100 TABLET ORAL DAILY
Qty: 90 TABLET | Refills: 1 | Status: SHIPPED | OUTPATIENT
Start: 2017-12-06 | End: 2018-06-19

## 2017-12-06 NOTE — NURSING NOTE
".  Chief Complaint   Patient presents with     Diabetes     f/u     Lipids     f/u     Hypertension     f/u       Initial BP (!) 166/98 (BP Location: Left arm, Patient Position: Sitting, Cuff Size: Adult Regular)  Pulse 85  Temp 98.1  F (36.7  C) (Tympanic)  Resp 16  Wt 115 lb 8 oz (52.4 kg)  LMP  (LMP Unknown)  SpO2 96%  BMI 19.03 kg/m2 Estimated body mass index is 19.03 kg/(m^2) as calculated from the following:    Height as of 10/17/17: 5' 5.32\" (1.659 m).    Weight as of this encounter: 115 lb 8 oz (52.4 kg).  Medication Reconciliation: complete     Princess CHRISTIANO Malloy, CMA      "

## 2017-12-06 NOTE — MR AVS SNAPSHOT
After Visit Summary   12/6/2017    Radha Uribe    MRN: [id number]           Patient Information     Date Of Birth          1950        Visit Information        Provider Department      12/6/2017 7:45 AM Kodak Preston MD [facility]        Today's Diagnoses     Controlled type 2 diabetes mellitus without complication, without long-term current use of insulin (H)    -  1    Essential hypertension, benign        Hypercholesterolemia        Type 2 diabetes mellitus without complication, without long-term current use of insulin (H)          Care Instructions    Increase Metoprolol to 100 mg daily, take 2 of the current 50 mg tabs daily  Use Claritin, Allegra or Zyrtec for allergies          Follow-ups after your visit        Follow-up notes from your care team     Return in about 6 months (around 6/6/2018).      Your next 10 appointments already scheduled     Apr 17, 2018  2:45 PM CDT   [facility] with  [facility]   [facility] ([facility])    [address] [place] [id number] [phone]            Apr 17, 2018  3:20 PM CDT   (Arrive by 3:05 PM)   Return Visit with Nydia Bernard PA-C   [facility] ([facility]    [address] [place] [address] [phone]              Who to contact     If you have questions or need follow up information about today's clinic visit or your schedule please contact [facility] directly at [phone].  Normal or non-critical lab and imaging results will be communicated to you by MyChart, letter or phone within 4 business days after the clinic has received the results. If you do not hear from us within 7 days, please contact the clinic through MyChart or phone. If you have a critical or abnormal lab result, we will notify you by  phone as soon as possible.  Submit refill requests through [employer] or call your pharmacy and they will forward the refill request to us. Please allow 3 business days for your refill to be completed.          Additional Information About Your Visit        [employer] Information     [employer] gives you secure access to your electronic health record. If you see a primary care provider, you can also send messages to your care team and make appointments. If you have questions, please call your primary care clinic.  If you do not have a primary care provider, please call [phone] and they will assist you.        Care EveryWhere ID     This is your Care EveryWhere ID. This could be used by other organizations to access your [facility] medical records  [id number]        Your Vitals Were     Pulse Temperature Respirations Last Period Pulse Oximetry BMI (Body Mass Index)    85 98.1  F (36.7  C) (Tympanic) 16 (LMP Unknown) 96% 19.03 kg/m2       Blood Pressure from Last 3 Encounters:   12/06/17 (!) 162/92   10/17/17 151/72   07/20/17 158/76    Weight from Last 3 Encounters:   12/06/17 115 lb 8 oz (52.4 kg)   10/17/17 117 lb 1.6 oz (53.1 kg)   07/20/17 112 lb 9.6 oz (51.1 kg)              We Performed the Following     Albumin Random Urine Quantitative with Creat Ratio     ALT     AST     Basic metabolic panel     Hemoglobin A1c     Lipid panel reflex to direct LDL Fasting     TSH with free T4 reflex          Today's Medication Changes          These changes are accurate as of: 12/6/17  7:46 AM.  If you have any questions, ask your nurse or doctor.               These medicines have changed or have updated prescriptions.        Dose/Directions    metoprolol 100 MG 24 hr tablet   Commonly known as:  TOPROL-XL   This may have changed:    - medication strength  - how much to take   Used for:  Essential hypertension, benign   Changed by:  Kodak Preston MD        Dose:  100 mg   Take 1 tablet (100 mg) by mouth daily   Quantity:  90  tablet   Refills:  1            Where to get your medicines      These medications were sent to [facility] [id number] [place] [facility] [place], [place] - [address] [facility]  [address] [facility] [address]     Phone:  [phone]     losartan 100 MG tablet    metFORMIN 500 MG tablet    metoprolol 100 MG 24 hr tablet    simvastatin 20 MG tablet                Primary Care Provider Office Phone # Fax #    Kodak Preston [phone] [address] [facility] [address]        Equal Access to Services     RUBEN CABA : Hadii aad ku hadasho Soomaali, waaxda luqadaha, qaybta kaalmada adeegyada, waxay idiin hayaan adeeg kharash milton . So [facility] [phone].    ATENCIÓN: Si habla español, tiene a eugene disposición servicios gratuitos de asistencia lingüística. [facility] [phone].    We comply with applicable federal civil rights laws and [place] laws. We do not discriminate on the basis of race, color, national origin, age, disability, sex, sexual orientation, or gender identity.            Thank you!     Thank you for choosing [facility]  for your care. Our goal is always to provide you with excellent care. Hearing back from our patients is one way we can continue to improve our services. Please take a few minutes to complete the written survey that you may receive in the mail after your visit with us. Thank you!             Your Updated Medication List - Protect others around you: Learn how to safely use, store and throw away your medicines at www.disposemymeds.org.          This list is accurate as of: 12/6/17  7:46 AM.  Always use your most recent med list.                   Brand Name Dispense Instructions for use Diagnosis    ascorbic acid 250 MG Chew chewable tablet    vitamin C     Take 500 mg by mouth daily        CALCIUM 600 PO      Take 1 tablet by mouth 2 times daily        DIASENSE MULTIVITAMIN PO      Take 1 tablet by mouth daily         hydrochlorothiazide 12.5 MG capsule    MICROZIDE    90 capsule    Take 1 capsule (12.5 mg) by mouth daily    Hypertension goal BP (blood pressure) < 140/90       letrozole 2.5 MG tablet    FEMARA    90 tablet    Take 1 tablet (2.5 mg) by mouth daily    Breast cancer of upper-inner quadrant of left female breast (H)       losartan 100 MG tablet    COZAAR    90 tablet    Take 1 tablet (100 mg) by mouth daily    Essential hypertension, benign       metFORMIN 500 MG tablet    GLUCOPHAGE    60 tablet    Take 1 tablet (500 mg) by mouth 2 times daily (with meals)    Type 2 diabetes mellitus without complication, without long-term current use of insulin (H)       metoprolol 100 MG 24 hr tablet    TOPROL-XL    90 tablet    Take 1 tablet (100 mg) by mouth daily    Essential hypertension, benign       simvastatin 20 MG tablet    ZOCOR    90 tablet    Take 1 tablet (20 mg) by mouth daily    Hypercholesterolemia       TYLENOL PO      Take by mouth as needed 500 mg as needed        vitamin D 1000 UNITS capsule      Take 2 capsules by mouth daily

## 2017-12-06 NOTE — PATIENT INSTRUCTIONS
Increase Metoprolol to 100 mg daily, take 2 of the current 50 mg tabs daily  Use Claritin, Allegra or Zyrtec for allergies

## 2017-12-06 NOTE — PROGRESS NOTES
SUBJECTIVE:   Radha Uribe is a 67 year old female who presents to clinic today for the following health issues:    Diabetes Follow-up      Patient is checking blood sugars: not at all    Diabetic concerns: None     Symptoms of hypoglycemia (low blood sugar): none     Paresthesias (numbness or burning in feet) or sores: No     Date of last diabetic eye exam: 2012    Hyperlipidemia Follow-Up      Rate your low fat/cholesterol diet?: good. Has koroma every morning.    Taking statin?  Yes, no muscle aches from statin    Other lipid medications/supplements?:  none    Hypertension Follow-up      Outpatient blood pressures are not being checked.    Low Salt Diet: low salt but tries not to add salt.    Pt is taking her medication regularly.  She has been under a lot of stress at work  She also took a Benadryl this AM for her sinus congestion    BP Readings from Last 2 Encounters:   12/06/17 (!) 162/92   10/17/17 151/72     Hemoglobin A1C (%)   Date Value   05/10/2017 6.2 (H)   05/18/2016 6.5 (H)     LDL Cholesterol Direct (mg/dL)   Date Value   05/10/2017 106 (H)   05/18/2016 104 (H)         Amount of exercise or physical activity: None    Problems taking medications regularly: No    Medication side effects: none    Diet: low salt, low fat/cholesterol and diabetic            Problem list and histories reviewed & adjusted, as indicated.  Additional history: as documented    Recent Labs   Lab Test  10/17/17   1423  07/20/17   1332  05/10/17   1544   05/18/16   1456   10/13/15   1218   04/01/15   1543   01/19/12   1327   A1C   --    --   6.2*   --   6.5*   --   6.3*   --   5.2   < >  5.7   LDL   --    --   106*   --   104*   --    --    --   84   < >  125.4*   HDL   --    --    --    --    --    --    --    --    --    --   48   TRIG   --    --    --    --    --    --    --    --    --    --   68   ALT  27  24  54*   < >  29   < >  21   < >   --    < >  36.0   CR  1.02  0.83  0.76   < >  1.10*   < >  1.08*   < >  1.00   <  >  0.6   GFRESTIMATED  54*  68  76   < >  50*   < >  51*   < >  56*   < >   --    GFRESTBLACK  65  83  >90   GFR Calc     < >  60*   < >  62   < >  68   < >   --    POTASSIUM  3.7  3.8  4.2   < >  4.3   < >  4.1   < >  4.2   < >  4.4   TSH   --    --   1.16   --    --    --   0.24*   --    --    < >   --     < > = values in this interval not displayed.      Labs reviewed in EPIC      Reviewed and updated as needed this visit by clinical staffTobacco  Allergies  Meds  Problems  Med Hx  Surg Hx  Fam Hx  Soc Hx        Reviewed and updated as needed this visit by Provider  Allergies  Meds  Problems         ROS:  CONSTITUTIONAL:NEGATIVE for fever, chills, change in weight  ENT/MOUTH: POSITIVE for nasal congestion, postnasal drainage and rhinorrhea-clear  RESP:NEGATIVE for significant cough or SOB  CV: NEGATIVE for chest pain, palpitations or peripheral edema  ENDOCRINE: NEGATIVE for temperature intolerance, skin/hair changes and POSITIVE  for HX diabetes  PSYCHIATRIC: POSITIVE foranxiety, Hx anxiety and Hx depression    OBJECTIVE:                                                    BP (!) 162/92 (BP Location: Left arm, Patient Position: Sitting, Cuff Size: Adult Regular)  Pulse 85  Temp 98.1  F (36.7  C) (Tympanic)  Resp 16  Wt 115 lb 8 oz (52.4 kg)  LMP  (LMP Unknown)  SpO2 96%  BMI 19.03 kg/m2  Body mass index is 19.03 kg/(m^2).  GENERAL APPEARANCE: healthy, alert and no distress  HENT: ear canals and TM's normal, nose and mouth without ulcers or lesions, nasal mucosa edematous without rhinorrhea, oral mucous membranes moist and oropharynx clear  RESP: lungs clear to auscultation - no rales, rhonchi or wheezes  CV: regular rates and rhythm, normal S1 S2, no S3 or S4 and no murmur, click or rub  MS: extremities normal- no gross deformities noted  PSYCH: mentation appears normal, affect flat and anxious    Diagnostic test results:  Lab: see below, results pending       ASSESSMENT/PLAN:                                                         ICD-10-CM    1. Controlled type 2 diabetes mellitus without complication, without long-term current use of insulin (H) E11.9 Hemoglobin A1c     Basic metabolic panel     Albumin Random Urine Quantitative with Creat Ratio     TSH with free T4 reflex   2. Essential hypertension, benign I10 metoprolol (TOPROL-XL) 100 MG 24 hr tablet     Basic metabolic panel     losartan (COZAAR) 100 MG tablet   3. Hypercholesterolemia E78.00 Lipid panel reflex to direct LDL Fasting     ALT     AST     simvastatin (ZOCOR) 20 MG tablet   4. Type 2 diabetes mellitus without complication, without long-term current use of insulin (H) E11.9 metFORMIN (GLUCOPHAGE) 500 MG tablet       Follow up with Provider - 3 mo for BP recheck   Patient Instructions   Increase Metoprolol to 100 mg daily, take 2 of the current 50 mg tabs daily  Use Claritin, Allegra or Zyrtec for allergies      Kodak Preston MD  [facility]

## 2017-12-13 DIAGNOSIS — E11.9 TYPE 2 DIABETES MELLITUS WITHOUT COMPLICATION, WITHOUT LONG-TERM CURRENT USE OF INSULIN (H): ICD-10-CM

## 2017-12-21 DIAGNOSIS — C50.212 BREAST CANCER OF UPPER-INNER QUADRANT OF LEFT FEMALE BREAST (H): ICD-10-CM

## 2017-12-21 NOTE — TELEPHONE ENCOUNTER
letrozole      Last Written Prescription Date: 12/17/16  Last Fill Quantity: 90,  # refills: 3   Last Office Visit with [facility] P or [facility] prescribing provider: 10/17/17 with Dr. Esparza  Next office visit: 4/17/18 with Nydia JARRELL

## 2017-12-22 RX ORDER — LETROZOLE 2.5 MG/1
2.5 TABLET, FILM COATED ORAL DAILY
Qty: 90 TABLET | Refills: 3 | Status: SHIPPED | OUTPATIENT
Start: 2017-12-22 | End: 2019-07-01

## 2018-02-16 DIAGNOSIS — I10 HYPERTENSION GOAL BP (BLOOD PRESSURE) < 140/90: ICD-10-CM

## 2018-02-16 RX ORDER — HYDROCHLOROTHIAZIDE 12.5 MG/1
CAPSULE ORAL
Qty: 90 CAPSULE | Refills: 1 | Status: SHIPPED | OUTPATIENT
Start: 2018-02-16 | End: 2018-11-02

## 2018-02-16 NOTE — TELEPHONE ENCOUNTER
"Requested Prescriptions   Pending Prescriptions Disp Refills     hydrochlorothiazide (MICROZIDE) 12.5 MG capsule [Pharmacy Med Name: HYDROCHLOROTHIAZIDE 12.5 MG CP]  Last Written Prescription Date:  2/10/2017  Last Fill Quantity: 90,  # refills: 3   Last office visit: 12/6/2017 with prescribing provider:  Dr. Preston   Future Office Visit:     90 capsule 2     Sig: TAKE 1 CAPSULE (12.5 MG) BY MOUTH DAILY    Diuretics (Including Combos) Protocol Failed    2/16/2018  1:51 AM       Failed - Blood pressure under 140/90 in past 12 months    BP Readings from Last 3 Encounters:   12/06/17 (!) 162/92   10/17/17 151/72   07/20/17 158/76                Passed - Recent or future visit with authorizing provider's specialty    Patient had office visit in the last year or has a visit in the next 30 days with authorizing provider.  See \"Patient Info\" tab in inbasket, or \"Choose Columns\" in Meds & Orders section of the refill encounter.            Passed - Patient is age 18 or older       Passed - No active pregancy on record       Passed - Normal serum creatinine on file in past 12 months    Recent Labs   Lab Test  10/17/17   1423   CR  1.02             Passed - Normal serum potassium on file in past 12 months    Recent Labs   Lab Test  10/17/17   1423   POTASSIUM  3.7                   Passed - Normal serum sodium on file in past 12 months    Recent Labs   Lab Test  10/17/17   1423   NA  134             Passed - No positive pregnancy test in past 12 months          "

## 2018-02-16 NOTE — TELEPHONE ENCOUNTER
Prescription approved per [facility] Refill Protocol.  Note to pharmacy patient needs b/p check.

## 2018-03-01 DIAGNOSIS — I10 HYPERTENSION GOAL BP (BLOOD PRESSURE) < 140/90: ICD-10-CM

## 2018-03-01 RX ORDER — HYDROCHLOROTHIAZIDE 12.5 MG/1
CAPSULE ORAL
Qty: 90 CAPSULE | Refills: 1 | Status: SHIPPED | OUTPATIENT
Start: 2018-03-01 | End: 2018-03-06

## 2018-03-01 NOTE — TELEPHONE ENCOUNTER
"Requested Prescriptions   Pending Prescriptions Disp Refills     hydrochlorothiazide (MICROZIDE) 12.5 MG capsule [Pharmacy Med Name: HYDROCHLOROTHIAZIDE 12.5 MG CP] 90 capsule 2     Sig: TAKE 1 CAPSULE (12.5 MG) BY MOUTH DAILY    Diuretics (Including Combos) Protocol Failed    3/1/2018  3:50 AM       Failed - Blood pressure under 140/90 in past 12 months    BP Readings from Last 3 Encounters:   12/06/17 (!) 162/92   10/17/17 151/72   07/20/17 158/76                Passed - Recent or future visit with authorizing provider's specialty    Patient had office visit in the last year or has a visit in the next 30 days with authorizing provider.  See \"Patient Info\" tab in inbasket, or \"Choose Columns\" in Meds & Orders section of the refill encounter.            Passed - Patient is age 18 or older       Passed - No active pregancy on record       Passed - Normal serum creatinine on file in past 12 months    Recent Labs   Lab Test  10/17/17   1423   CR  1.02             Passed - Normal serum potassium on file in past 12 months    Recent Labs   Lab Test  10/17/17   1423   POTASSIUM  3.7                   Passed - Normal serum sodium on file in past 12 months    Recent Labs   Lab Test  10/17/17   1423   NA  134             Passed - No positive pregnancy test in past 12 months        Prescription approved per [facility] Refill Protocol.    "

## 2018-05-22 ENCOUNTER — ONCOLOGY VISIT (OUTPATIENT)
Dept: ONCOLOGY | Facility: CLINIC | Age: 68
End: 2018-05-22
Attending: PHYSICIAN ASSISTANT
Payer: COMMERCIAL

## 2018-05-22 ENCOUNTER — APPOINTMENT (OUTPATIENT)
Dept: LAB | Facility: CLINIC | Age: 68
End: 2018-05-22
Attending: INTERNAL MEDICINE
Payer: COMMERCIAL

## 2018-05-22 VITALS
RESPIRATION RATE: 18 BRPM | SYSTOLIC BLOOD PRESSURE: 138 MMHG | DIASTOLIC BLOOD PRESSURE: 76 MMHG | WEIGHT: 111.8 LBS | TEMPERATURE: 98.3 F | HEART RATE: 70 BPM | OXYGEN SATURATION: 97 % | BODY MASS INDEX: 18.42 KG/M2

## 2018-05-22 DIAGNOSIS — C50.212 CARCINOMA OF UPPER-INNER QUADRANT OF LEFT FEMALE BREAST (H): ICD-10-CM

## 2018-05-22 LAB
ALBUMIN SERPL-MCNC: 4.3 G/DL (ref 3.4–5)
ALP SERPL-CCNC: 70 U/L (ref 40–150)
ALT SERPL W P-5'-P-CCNC: 19 U/L (ref 0–50)
ANION GAP SERPL CALCULATED.3IONS-SCNC: 8 MMOL/L (ref 3–14)
AST SERPL W P-5'-P-CCNC: 15 U/L (ref 0–45)
BASOPHILS # BLD AUTO: 0.1 10E9/L (ref 0–0.2)
BASOPHILS NFR BLD AUTO: 0.9 %
BILIRUB SERPL-MCNC: 0.4 MG/DL (ref 0.2–1.3)
BUN SERPL-MCNC: 20 MG/DL (ref 7–30)
CALCIUM SERPL-MCNC: 9.6 MG/DL (ref 8.5–10.1)
CHLORIDE SERPL-SCNC: 99 MMOL/L (ref 94–109)
CO2 SERPL-SCNC: 28 MMOL/L (ref 20–32)
CREAT SERPL-MCNC: 0.99 MG/DL (ref 0.52–1.04)
DIFFERENTIAL METHOD BLD: NORMAL
EOSINOPHIL # BLD AUTO: 0.2 10E9/L (ref 0–0.7)
EOSINOPHIL NFR BLD AUTO: 1.6 %
ERYTHROCYTE [DISTWIDTH] IN BLOOD BY AUTOMATED COUNT: 11.9 % (ref 10–15)
GFR SERPL CREATININE-BSD FRML MDRD: 56 ML/MIN/1.7M2
GLUCOSE SERPL-MCNC: 146 MG/DL (ref 70–99)
HCT VFR BLD AUTO: 37.2 % (ref 35–47)
HGB BLD-MCNC: 12.4 G/DL (ref 11.7–15.7)
IMM GRANULOCYTES # BLD: 0 10E9/L (ref 0–0.4)
IMM GRANULOCYTES NFR BLD: 0.4 %
LYMPHOCYTES # BLD AUTO: 2 10E9/L (ref 0.8–5.3)
LYMPHOCYTES NFR BLD AUTO: 18.9 %
MCH RBC QN AUTO: 31.1 PG (ref 26.5–33)
MCHC RBC AUTO-ENTMCNC: 33.3 G/DL (ref 31.5–36.5)
MCV RBC AUTO: 93 FL (ref 78–100)
MONOCYTES # BLD AUTO: 0.7 10E9/L (ref 0–1.3)
MONOCYTES NFR BLD AUTO: 6.7 %
NEUTROPHILS # BLD AUTO: 7.4 10E9/L (ref 1.6–8.3)
NEUTROPHILS NFR BLD AUTO: 71.5 %
NRBC # BLD AUTO: 0 10*3/UL
NRBC BLD AUTO-RTO: 0 /100
PLATELET # BLD AUTO: 266 10E9/L (ref 150–450)
POTASSIUM SERPL-SCNC: 4.1 MMOL/L (ref 3.4–5.3)
PROT SERPL-MCNC: 7.4 G/DL (ref 6.8–8.8)
RBC # BLD AUTO: 3.99 10E12/L (ref 3.8–5.2)
SODIUM SERPL-SCNC: 135 MMOL/L (ref 133–144)
WBC # BLD AUTO: 10.3 10E9/L (ref 4–11)

## 2018-05-22 PROCEDURE — G0463 HOSPITAL OUTPT CLINIC VISIT: HCPCS | Mod: ZF

## 2018-05-22 PROCEDURE — 80053 COMPREHEN METABOLIC PANEL: CPT | Performed by: INTERNAL MEDICINE

## 2018-05-22 PROCEDURE — 99213 OFFICE O/P EST LOW 20 MIN: CPT | Mod: ZP | Performed by: PHYSICIAN ASSISTANT

## 2018-05-22 PROCEDURE — 36415 COLL VENOUS BLD VENIPUNCTURE: CPT

## 2018-05-22 PROCEDURE — 85025 COMPLETE CBC W/AUTO DIFF WBC: CPT | Performed by: INTERNAL MEDICINE

## 2018-05-22 ASSESSMENT — PAIN SCALES - GENERAL: PAINLEVEL: NO PAIN (0)

## 2018-05-22 NOTE — NURSING NOTE
"Oncology Rooming Note    May 22, 2018 10:25 AM   Radha Uribe is a 68 year old female who presents for:    Chief Complaint   Patient presents with     Blood Draw     Labs drawn via  by RN. VS Taken.     Oncology Clinic Visit     Return for Breast Ca      Initial Vitals: /76 (BP Location: Right arm, Patient Position: Sitting, Cuff Size: Adult Regular)  Pulse 70  Temp 98.3  F (36.8  C) (Oral)  Resp 18  Wt 50.7 kg (111 lb 12.8 oz)  LMP  (LMP Unknown)  SpO2 97%  BMI 18.42 kg/m2 Estimated body mass index is 18.42 kg/(m^2) as calculated from the following:    Height as of 10/17/17: 1.659 m (5' 5.32\").    Weight as of this encounter: 50.7 kg (111 lb 12.8 oz). Body surface area is 1.53 meters squared.  No Pain (0) Comment: Data Unavailable   No LMP recorded (lmp unknown). Patient has had a hysterectomy.  Allergies reviewed: Yes  Medications reviewed: Yes    Medications: Medication refills not needed today.  Pharmacy name entered into [employer]: [facility] [id number] [place], [place] - [address] [place] [address]    Clinical concerns: no concerns  Nydia was notified.    5 minutes for nursing intake (face to face time)     Rosmery Ashby MA              "

## 2018-05-22 NOTE — LETTER
5/22/2018      RE: Radha Uribe  [address]  [facility] [address]       DIAGNOSIS:  Clinical stage II infiltrating ductal carcinoma of the left breast. The patient had noticed a mass in her left breast and went in for evaluation. Mammogram on 09/13/2013 revealed a 2.5 x 2.0 x 0.8 cm left breast mass. Ultrasound-guided biopsy on 09/17/2013 revealed infiltrating ductal carcinoma, Daylin grade 3. Tumor was weakly to moderately positive for estrogen receptors and weakly positive for progesterone receptors. HER-2 was amplified. MRI of the breasts revealed a 1.6 x 1.6 x 1.6 cm mass in her left breast and her tumor was too small for the I-SPY-2 clinical trial. She did begin neoadjuvant treatment with weekly Taxol and Herceptin on 10/18/2013 and completed 12 doses on 01/03/2013. She started Adriamycin/Cytoxan (AC) on 01/09/2014 and completed 4 cycles on 02/20/2014. She went on to have a left lumpectomy with left sentinel lymph node biopsy on 03/17/2014. There was no residual cancer. Zero out of 1 lymph nodes was positive. She restarted Herceptin on 04/02/2014 and completed her year on 12/09/2014. She completed left-sided breast radiation receiving 6000 cGy on 05/23/2014. She started letrozole in 03/2014.    INTERVAL HISTORY:  Ms. Uribe returns to the clinic today for follow-up for her breast carcinoma. She continues on the letrozole. She has no new concerns. No new pains or myalgias, myalgias, or any new side effects that she relates to letrozole.  She denies chest pain, shortness of breath, cough, nausea, vomiting, abdominal pain, new bleeding bone pains, or new headaches (baseline has some headaches during allergy season when congested).  She continues to smoke. Walking for exercise.    MEDICATIONS:   Current Outpatient Prescriptions   Medication Sig Dispense Refill     Acetaminophen (TYLENOL PO) Take by mouth as needed 500 mg as needed       ascorbic acid (VITAMIN C) 250 MG CHEW Take 500 mg by mouth  daily        Calcium Carbonate (CALCIUM 600 PO) Take 1 tablet by mouth 2 times daily        Cholecalciferol (VITAMIN D) 1000 UNITS capsule Take 2 capsules by mouth daily        hydrochlorothiazide (MICROZIDE) 12.5 MG capsule TAKE 1 CAPSULE (12.5 MG) BY MOUTH DAILY 90 capsule 1     hydrochlorothiazide (MICROZIDE) 12.5 MG capsule TAKE 1 CAPSULE (12.5 MG) BY MOUTH DAILY 90 capsule 0     letrozole (FEMARA) 2.5 MG tablet Take 1 tablet (2.5 mg) by mouth daily 90 tablet 3     losartan (COZAAR) 100 MG tablet Take 1 tablet (100 mg) by mouth daily 90 tablet 1     metFORMIN (GLUCOPHAGE) 500 MG tablet Take 1 tablet (500 mg) by mouth 2 times daily (with meals) 60 tablet 5     metFORMIN (GLUCOPHAGE) 500 MG tablet TAKE 1 TABLET (500 MG) BY MOUTH 2 TIMES DAILY (WITH MEALS) 60 tablet 5     metoprolol (TOPROL-XL) 100 MG 24 hr tablet Take 1 tablet (100 mg) by mouth daily 90 tablet 1     Multiple Vitamins-Minerals (DIASENSE MULTIVITAMIN PO) Take 1 tablet by mouth daily       simvastatin (ZOCOR) 20 MG tablet Take 1 tablet (20 mg) by mouth daily 90 tablet 1     simvastatin (ZOCOR) 20 MG tablet TAKE 1 TABLET (20 MG) BY MOUTH DAILY 90 tablet 0         ALLERGIES:    Allergies   Allergen Reactions     Aspirin GI Disturbance     Dust Mites      And pollen       PHYSICAL EXAMINATION:  Vitals: /76 (BP Location: Right arm, Patient Position: Sitting, Cuff Size: Adult Regular)  Pulse 70  Temp 98.3  F (36.8  C) (Oral)  Resp 18  Wt 50.7 kg (111 lb 12.8 oz)  LMP  (LMP Unknown)  SpO2 97%  BMI 18.42 kg/m2  GENERAL:  A pleasant person in no acute distress.   HEENT:  Sclerae are nonicteric.   NECK:  Supple.   LYMPH NODES:  No peripheral lymphadenopathy noted in the axillary, supraclavicular, or cervical regions.   LUNGS:  Clear to auscultation bilaterally.   HEART:  Regular rate and rhythm.   ABDOMEN:  Bowel sounds are active.  Soft and nontender.  No hepatosplenomegaly or other masses appreciated.  LOWER EXTREMITIES:  Without pitting edema  to the knees bilaterally.   NEUROLOGICAL:  Alert/orientated/able to answer all questions.  CN grossly intact.  BREAST: Right breast normal to inspection, no masses. Left breast, well healed surgical incision in the 10-12 o'clock position, no masses.     LAB:   Results for BAR URIBE (MRN [id number]) as of 5/22/2018 10:23   Ref. Range 5/22/2018 10:09   WBC Latest Ref Range: 4.0 - 11.0 10e9/L 10.3   Hemoglobin Latest Ref Range: 11.7 - 15.7 g/dL 12.4   Hematocrit Latest Ref Range: 35.0 - 47.0 % 37.2   Platelet Count Latest Ref Range: 150 - 450 10e9/L 266     IMPRESSION/PLAN:   1.  Clinical stage II infiltrating ductal carcinoma of the left breast, weakly/moderately ER positive, HER-2 positive, status-post neoadjuvant Taxol/Herceptin and ddAC with complete pathologic response to treatment at time of lumpectomy. She completed adjuvant radiation. Ms. Uribe continues to do well at this time. She is not having any signs or symptoms that would suggest recurrence of her breast carcinoma. Plan to continue letrozole daily with plan to complete 10 years of therapy.   - Mammogram due in October 2018 -- follow-up with Dr. Esparza at that time    2.  Bone health. Bone density in 2014 was consistent with a T score of -2.4. She declined any intervention and does not want to repeat the bone density scans. She understands there is potential bone loss with letrozole.    3. History of smoking.  Ms. Uribe continues to smoke and does not want to quit. She has not been interested in screening CT, which she is eligible for with her smoking histoy.    4. Diabetes, HTN, hyperlipidemia. Follows with PCP, last seen in December.      Nydia Bernard PA-C

## 2018-05-22 NOTE — NURSING NOTE
Chief Complaint   Patient presents with     Blood Draw     Labs drawn via  by RN. VS Taken.     Kelly Hudson RN

## 2018-05-22 NOTE — MR AVS SNAPSHOT
After Visit Summary   5/22/2018    Radha Uribe    MRN: [id number]           Patient Information     Date Of Birth          1950        Visit Information        Provider Department      5/22/2018 10:10 AM Nydia Bernard PA-C [facility]        Today's Diagnoses     Carcinoma of upper-inner quadrant of left female breast (H)           Follow-ups after your visit        Future tests that were ordered for you today     Open Future Orders        Priority Expected Expires Ordered    CBC with platelets differential Routine 11/22/2018 5/22/2019 5/22/2018    Comprehensive metabolic panel Routine 11/22/2018 5/22/2019 5/22/2018    Mammo diagnostic  digital (bilateral)* Routine  5/22/2019 5/22/2018            Who to contact     If you have questions or need follow up information about today's clinic visit or your schedule please contact [facility] directly at [phone].  Normal or non-critical lab and imaging results will be communicated to you by MyChart, letter or phone within 4 business days after the clinic has received the results. If you do not hear from us within 7 days, please contact the clinic through TextualAdshart or phone. If you have a critical or abnormal lab result, we will notify you by phone as soon as possible.  Submit refill requests through Tray or call your pharmacy and they will forward the refill request to us. Please allow 3 business days for your refill to be completed.          Additional Information About Your Visit        MyChart Information     Tray gives you secure access to your electronic health record. If you see a primary care provider, you can also send messages to your care team and make appointments. If you have questions, please call your primary care clinic.  If you do not have a primary care provider, please call [phone] and they will assist you.        Care EveryWhere ID     This is your Care EveryWhere ID.  This could be used by other organizations to access your [facility] medical records  [id number]        Your Vitals Were     Pulse Temperature Respirations Last Period Pulse Oximetry BMI (Body Mass Index)    70 98.3  F (36.8  C) (Oral) 18 (LMP Unknown) 97% 18.42 kg/m2       Blood Pressure from Last 3 Encounters:   05/22/18 138/76   12/06/17 (!) 162/92   10/17/17 151/72    Weight from Last 3 Encounters:   05/22/18 50.7 kg (111 lb 12.8 oz)   12/06/17 52.4 kg (115 lb 8 oz)   10/17/17 53.1 kg (117 lb 1.6 oz)              We Performed the Following     CBC with platelets differential     Comprehensive metabolic panel        Primary Care Provider Office Phone # Fax #    Kodak Preston [phone] [address] [facility] [facility] [address]        Equal Access to Services     MARIBEL [facility] Wesley[facility]AIDA : Hadii elmer ku galileao Soroseann, waaxda luqadaha, qaybta kaalmada adeegyada, blanca rose . So [facility] [phone].    ATENCIÓN: Si habla español, tiene a eugene disposición servicios gratuitos de asistencia lingüística. Llame al [phone].    We comply with applicable federal civil rights laws and [place] laws. We do not discriminate on the basis of race, color, national origin, age, disability, sex, sexual orientation, or gender identity.            Thank you!     Thank you for choosing [facility]  for your care. Our goal is always to provide you with excellent care. Hearing back from our patients is one way we can continue to improve our services. Please take a few minutes to complete the written survey that you may receive in the mail after your visit with us. Thank you!             Your Updated Medication List - Protect others around you: Learn how to safely use, store and throw away your medicines at www.disposemymeds.org.          This list is accurate as of 5/22/18 10:44 AM.  Always use your most recent med list.                   Brand Name Dispense Instructions  for use Diagnosis    ascorbic acid 250 MG Chew chewable tablet    vitamin C     Take 500 mg by mouth daily        CALCIUM 600 PO      Take 1 tablet by mouth 2 times daily        DIASENSE MULTIVITAMIN PO      Take 1 tablet by mouth daily        * hydrochlorothiazide 12.5 MG capsule    MICROZIDE    90 capsule    TAKE 1 CAPSULE (12.5 MG) BY MOUTH DAILY    Hypertension goal BP (blood pressure) < 140/90       * hydrochlorothiazide 12.5 MG capsule    MICROZIDE    90 capsule    TAKE 1 CAPSULE (12.5 MG) BY MOUTH DAILY    Hypertension goal BP (blood pressure) < 140/90       letrozole 2.5 MG tablet    FEMARA    90 tablet    Take 1 tablet (2.5 mg) by mouth daily    Breast cancer of upper-inner quadrant of left female breast (H)       losartan 100 MG tablet    COZAAR    90 tablet    Take 1 tablet (100 mg) by mouth daily    Essential hypertension, benign       * metFORMIN 500 MG tablet    GLUCOPHAGE    60 tablet    Take 1 tablet (500 mg) by mouth 2 times daily (with meals)    Type 2 diabetes mellitus without complication, without long-term current use of insulin (H)       * metFORMIN 500 MG tablet    GLUCOPHAGE    60 tablet    TAKE 1 TABLET (500 MG) BY MOUTH 2 TIMES DAILY (WITH MEALS)    Type 2 diabetes mellitus without complication, without long-term current use of insulin (H)       metoprolol succinate 100 MG 24 hr tablet    TOPROL-XL    90 tablet    Take 1 tablet (100 mg) by mouth daily    Essential hypertension, benign       * simvastatin 20 MG tablet    ZOCOR    90 tablet    Take 1 tablet (20 mg) by mouth daily    Hypercholesterolemia       * simvastatin 20 MG tablet    ZOCOR    90 tablet    TAKE 1 TABLET (20 MG) BY MOUTH DAILY    Hypercholesterolemia       TYLENOL PO      Take by mouth as needed 500 mg as needed        vitamin D 1000 units capsule      Take 2 capsules by mouth daily        * Notice:  This list has 6 medication(s) that are the same as other medications prescribed for you. Read the directions carefully, and  ask your doctor or other care provider to review them with you.

## 2018-05-22 NOTE — PROGRESS NOTES
DIAGNOSIS:  Clinical stage II infiltrating ductal carcinoma of the left breast. The patient had noticed a mass in her left breast and went in for evaluation. Mammogram on 09/13/2013 revealed a 2.5 x 2.0 x 0.8 cm left breast mass. Ultrasound-guided biopsy on 09/17/2013 revealed infiltrating ductal carcinoma, [place] grade 3. Tumor was weakly to moderately positive for estrogen receptors and weakly positive for progesterone receptors. HER-2 was amplified. MRI of the breasts revealed a 1.6 x 1.6 x 1.6 cm mass in her left breast and her tumor was too small for the I-SPY-2 clinical trial. She did begin neoadjuvant treatment with weekly Taxol and Herceptin on 10/18/2013 and completed 12 doses on 01/03/2013. She started Adriamycin/Cytoxan (AC) on 01/09/2014 and completed 4 cycles on 02/20/2014. She went on to have a left lumpectomy with left sentinel lymph node biopsy on 03/17/2014. There was no residual cancer. Zero out of 1 lymph nodes was positive. She restarted Herceptin on 04/02/2014 and completed her year on 12/09/2014. She completed left-sided breast radiation receiving 6000 cGy on 05/23/2014. She started letrozole in 03/2014.    INTERVAL HISTORY:  Ms. Uribe returns to the clinic today for follow-up for her breast carcinoma. She continues on the letrozole. She has no new concerns. No new pains or myalgias, myalgias, or any new side effects that she relates to letrozole.  She denies chest pain, shortness of breath, cough, nausea, vomiting, abdominal pain, new bleeding bone pains, or new headaches (baseline has some headaches during allergy season when congested).  She continues to smoke. Walking for exercise.    MEDICATIONS:   Current Outpatient Prescriptions   Medication Sig Dispense Refill     Acetaminophen (TYLENOL PO) Take by mouth as needed 500 mg as needed       ascorbic acid (VITAMIN C) 250 MG CHEW Take 500 mg by mouth daily        Calcium Carbonate (CALCIUM 600 PO) Take 1 tablet by mouth 2 times daily         Cholecalciferol (VITAMIN D) 1000 UNITS capsule Take 2 capsules by mouth daily        hydrochlorothiazide (MICROZIDE) 12.5 MG capsule TAKE 1 CAPSULE (12.5 MG) BY MOUTH DAILY 90 capsule 1     hydrochlorothiazide (MICROZIDE) 12.5 MG capsule TAKE 1 CAPSULE (12.5 MG) BY MOUTH DAILY 90 capsule 0     letrozole (FEMARA) 2.5 MG tablet Take 1 tablet (2.5 mg) by mouth daily 90 tablet 3     losartan (COZAAR) 100 MG tablet Take 1 tablet (100 mg) by mouth daily 90 tablet 1     metFORMIN (GLUCOPHAGE) 500 MG tablet Take 1 tablet (500 mg) by mouth 2 times daily (with meals) 60 tablet 5     metFORMIN (GLUCOPHAGE) 500 MG tablet TAKE 1 TABLET (500 MG) BY MOUTH 2 TIMES DAILY (WITH MEALS) 60 tablet 5     metoprolol (TOPROL-XL) 100 MG 24 hr tablet Take 1 tablet (100 mg) by mouth daily 90 tablet 1     Multiple Vitamins-Minerals (DIASENSE MULTIVITAMIN PO) Take 1 tablet by mouth daily       simvastatin (ZOCOR) 20 MG tablet Take 1 tablet (20 mg) by mouth daily 90 tablet 1     simvastatin (ZOCOR) 20 MG tablet TAKE 1 TABLET (20 MG) BY MOUTH DAILY 90 tablet 0         ALLERGIES:    Allergies   Allergen Reactions     Aspirin GI Disturbance     Dust Mites      And pollen       PHYSICAL EXAMINATION:  Vitals: /76 (BP Location: Right arm, Patient Position: Sitting, Cuff Size: Adult Regular)  Pulse 70  Temp 98.3  F (36.8  C) (Oral)  Resp 18  Wt 50.7 kg (111 lb 12.8 oz)  LMP  (LMP Unknown)  SpO2 97%  BMI 18.42 kg/m2  GENERAL:  A pleasant person in no acute distress.   HEENT:  Sclerae are nonicteric.   NECK:  Supple.   LYMPH NODES:  No peripheral lymphadenopathy noted in the axillary, supraclavicular, or cervical regions.   LUNGS:  Clear to auscultation bilaterally.   HEART:  Regular rate and rhythm.   ABDOMEN:  Bowel sounds are active.  Soft and nontender.  No hepatosplenomegaly or other masses appreciated.  LOWER EXTREMITIES:  Without pitting edema to the knees bilaterally.   NEUROLOGICAL:  Alert/orientated/able to answer all  questions.  CN grossly intact.  BREAST: Right breast normal to inspection, no masses. Left breast, well healed surgical incision in the 10-12 o'clock position, no masses.     LAB:   Results for BAR URIBE (MRN [id number]) as of 5/22/2018 10:23   Ref. Range 5/22/2018 10:09   WBC Latest Ref Range: 4.0 - 11.0 10e9/L 10.3   Hemoglobin Latest Ref Range: 11.7 - 15.7 g/dL 12.4   Hematocrit Latest Ref Range: 35.0 - 47.0 % 37.2   Platelet Count Latest Ref Range: 150 - 450 10e9/L 266     IMPRESSION/PLAN:   1.  Clinical stage II infiltrating ductal carcinoma of the left breast, weakly/moderately ER positive, HER-2 positive, status-post neoadjuvant Taxol/Herceptin and ddAC with complete pathologic response to treatment at time of lumpectomy. She completed adjuvant radiation. Ms. Uribe continues to do well at this time. She is not having any signs or symptoms that would suggest recurrence of her breast carcinoma. Plan to continue letrozole daily with plan to complete 10 years of therapy.   - Mammogram due in October 2018 -- follow-up with Dr. Esparza at that time    2.  Bone health. Bone density in 2014 was consistent with a T score of -2.4. She declined any intervention and does not want to repeat the bone density scans. She understands there is potential bone loss with letrozole.    3. History of smoking.  Ms. Uribe continues to smoke and does not want to quit. She has not been interested in screening CT, which she is eligible for with her smoking histoy.    4. Diabetes, HTN, hyperlipidemia. Follows with PCP, last seen in December.    Nydia Bernard PA-C

## 2018-06-15 ENCOUNTER — TELEPHONE (OUTPATIENT)
Dept: FAMILY MEDICINE | Facility: CLINIC | Age: 68
End: 2018-06-15

## 2018-06-15 NOTE — TELEPHONE ENCOUNTER
Panel Management Review      Patient has the following on her problem list:     Diabetes    ASA: Passed    Last A1C  Lab Results   Component Value Date    A1C 6.1 12/06/2017    A1C 6.2 05/10/2017    A1C 6.5 05/18/2016    A1C 6.3 10/13/2015    A1C 5.2 04/01/2015     A1C tested: Passed    Last LDL:    Lab Results   Component Value Date    CHOL 187 01/19/2012     Lab Results   Component Value Date    HDL 48 01/19/2012     Lab Results   Component Value Date     05/10/2017     Lab Results   Component Value Date    TRIG 68 01/19/2012     No results found for: CHOLHDLRATIO  No results found for: NHDL    Is the patient on a Statin? YES             Is the patient on Aspirin? YES    Medications     HMG CoA Reductase Inhibitors    simvastatin (ZOCOR) 20 MG tablet    simvastatin (ZOCOR) 20 MG tablet          Last three blood pressure readings:  BP Readings from Last 3 Encounters:   05/22/18 138/76   12/06/17 (!) 162/92   10/17/17 151/72       Date of last diabetes office visit:      Tobacco History:     History   Smoking Status     Current Some Day Smoker     Packs/day: 0.30   Smokeless Tobacco     Never Used           Composite cancer screening  Chart review shows that this patient is due/due soon for the following Colonoscopy  Summary:    Patient is due/failing the following:   A1C, BP CHECK and COLONOSCOPY    Action needed:   Patient needs office visit for Diabetes, Colonoscopy and Hypertension.    Type of outreach:    Sent letter.    Questions for provider review:    None                                                                                                                                    Hali Taylor CMA       Chart routed to NA .

## 2018-06-15 NOTE — LETTER
Kate 15, 2018    Radha Uribe  [address]  [facility] [address]    Dear Augusto Garcia cares about your health and your health plan.  I have reviewed your medical conditions, medication list and lab results, and am making recommendations based on this review to better manage your health.    You are in particular need of attention regarding:  -Diabetes  -High Blood Pressure  -Colon Cancer Screening    I am recommending that you:     -schedule a FOLLOWUP OFFICE APPOINTMENT with me.       -schedule a COLONOSCOPY to look for colon cancer (due every 10 years or 5 years in higher risk situations.)        Colon cancer is now the second leading cause of cancer-related deaths in the [place] for both men and women and there are over 130,000 new cases and 50,000 deaths per year from colon cancer.  Colonoscopies can prevent 90-95% of these deaths.  Problem lesions can be removed before they ever become cancer.  This test is not only looking for cancer, but also getting rid of precancerious lesions.    If you are under/uninsured, we recommend you contact the [employer] program. [employer] is a free colorectal cancer screening program that provides colonoscopies for eligible under/uninsured [place] men and women. If you are interested in receiving a free colonoscopy, please call [employer] at [phone] (mention code [employer]Web) to see if you re eligible.      If you do not wish to do a colonoscopy or cannot afford to do one, at this time, there is another option. It is called a FIT test or Fecal Immunochemical Occult Blood Test (take home stool sample kit).  It does not replace the colonoscopy for colorectal cancer screening, but it can detect hidden bleeding in the lower colon.  It does need to be repeated every year and if a positive result is obtained, you would be referred for a colonoscopy.          If you have completed either one of these tests at another facility, please call with the  details of when and where the tests were done and if they were normal or not. Or have the records sent to our clinic so that we can best coordinate your care.      Please call us at the [place] location:  [phone] or use [employer] to address the above recommendations.     Thank you for trusting [facility].  We appreciate the opportunity to serve you and look forward to supporting your healthcare in the future.    If you have (or plan to have) any of these tests done at a facility other than a [facility] or a [facility], please have the results sent to the [facility] location noted above.      Best Regards,    Kodak Preston MD

## 2018-06-19 ENCOUNTER — OFFICE VISIT (OUTPATIENT)
Dept: FAMILY MEDICINE | Facility: CLINIC | Age: 68
End: 2018-06-19
Payer: COMMERCIAL

## 2018-06-19 VITALS
TEMPERATURE: 97.4 F | WEIGHT: 109 LBS | BODY MASS INDEX: 17.96 KG/M2 | DIASTOLIC BLOOD PRESSURE: 82 MMHG | HEART RATE: 76 BPM | RESPIRATION RATE: 16 BRPM | SYSTOLIC BLOOD PRESSURE: 148 MMHG | OXYGEN SATURATION: 99 %

## 2018-06-19 DIAGNOSIS — Z11.59 NEED FOR HEPATITIS C SCREENING TEST: ICD-10-CM

## 2018-06-19 DIAGNOSIS — I10 ESSENTIAL HYPERTENSION, BENIGN: Chronic | ICD-10-CM

## 2018-06-19 DIAGNOSIS — E11.9 TYPE 2 DIABETES MELLITUS WITHOUT COMPLICATION, WITHOUT LONG-TERM CURRENT USE OF INSULIN (H): ICD-10-CM

## 2018-06-19 DIAGNOSIS — E78.00 HYPERCHOLESTEROLEMIA: ICD-10-CM

## 2018-06-19 DIAGNOSIS — E11.9 CONTROLLED TYPE 2 DIABETES MELLITUS WITHOUT COMPLICATION, WITHOUT LONG-TERM CURRENT USE OF INSULIN (H): Primary | Chronic | ICD-10-CM

## 2018-06-19 LAB — HBA1C MFR BLD: 6.3 % (ref 0–5.6)

## 2018-06-19 PROCEDURE — 84450 TRANSFERASE (AST) (SGOT): CPT | Performed by: FAMILY MEDICINE

## 2018-06-19 PROCEDURE — 36415 COLL VENOUS BLD VENIPUNCTURE: CPT | Performed by: FAMILY MEDICINE

## 2018-06-19 PROCEDURE — 84460 ALANINE AMINO (ALT) (SGPT): CPT | Performed by: FAMILY MEDICINE

## 2018-06-19 PROCEDURE — 99214 OFFICE O/P EST MOD 30 MIN: CPT | Performed by: FAMILY MEDICINE

## 2018-06-19 PROCEDURE — 86803 HEPATITIS C AB TEST: CPT | Performed by: FAMILY MEDICINE

## 2018-06-19 PROCEDURE — 80061 LIPID PANEL: CPT | Performed by: FAMILY MEDICINE

## 2018-06-19 PROCEDURE — 80048 BASIC METABOLIC PNL TOTAL CA: CPT | Performed by: FAMILY MEDICINE

## 2018-06-19 PROCEDURE — 82043 UR ALBUMIN QUANTITATIVE: CPT | Performed by: FAMILY MEDICINE

## 2018-06-19 PROCEDURE — 84443 ASSAY THYROID STIM HORMONE: CPT | Performed by: FAMILY MEDICINE

## 2018-06-19 PROCEDURE — 83036 HEMOGLOBIN GLYCOSYLATED A1C: CPT | Performed by: FAMILY MEDICINE

## 2018-06-19 RX ORDER — LOSARTAN POTASSIUM 100 MG/1
100 TABLET ORAL DAILY
Qty: 90 TABLET | Refills: 1 | Status: SHIPPED | OUTPATIENT
Start: 2018-06-19 | End: 2019-01-09

## 2018-06-19 RX ORDER — METOPROLOL SUCCINATE 100 MG/1
100 TABLET, EXTENDED RELEASE ORAL DAILY
Qty: 90 TABLET | Refills: 1 | Status: SHIPPED | OUTPATIENT
Start: 2018-06-19 | End: 2019-01-09

## 2018-06-19 NOTE — PROGRESS NOTES
SUBJECTIVE:   Radha Uribe is a 68 year old female who presents to clinic today for the following health issues:      Diabetes Follow-up      Patient is checking blood sugars: not at all    Diabetic concerns: None     Symptoms of hypoglycemia (low blood sugar): none     Paresthesias (numbness or burning in feet) or sores: No     Date of last diabetic eye exam: 10 years ago    Pt has been taking the Metformin 250 mg BID (1/2 of 500 mg tab twice daily)    Hyperlipidemia Follow-Up      Rate your low fat/cholesterol diet?: good    Taking statin?  Yes, no muscle aches from statin    Other lipid medications/supplements?:  none    Hypertension Follow-up      Outpatient blood pressures are not being checked.    Low Salt Diet: no added salt    BP Readings from Last 2 Encounters:   06/19/18 148/82   05/22/18 138/76     Hemoglobin A1C (%)   Date Value   12/06/2017 6.1 (H)   05/10/2017 6.2 (H)     LDL Cholesterol Direct (mg/dL)   Date Value   05/10/2017 106 (H)   05/18/2016 104 (H)       Amount of exercise or physical activity: 6-7 days/week for an average of greater than 60 minutes    Problems taking medications regularly: No    Medication side effects: none    Diet: low fat/cholesterol            Problem list and histories reviewed & adjusted, as indicated.  Additional history: as documented    Recent Labs   Lab Test  05/22/18   1009  12/06/17   0743  10/17/17   1423  07/20/17   1332  05/10/17   1544   05/18/16   1456   10/13/15   1218   04/01/15   1543   01/19/12   1327   A1C   --   6.1*   --    --   6.2*   --   6.5*   --   6.3*   --   5.2   < >  5.7   LDL   --    --    --    --   106*   --   104*   --    --    --   84   < >  125.4*   HDL   --    --    --    --    --    --    --    --    --    --    --    --   48   TRIG   --    --    --    --    --    --    --    --    --    --    --    --   68   ALT  19   --   27  24  54*   < >  29   < >  21   < >   --    < >  36.0   CR  0.99   --   1.02  0.83  0.76   < >  1.10*   < >   1.08*   < >  1.00   < >  0.6   GFRESTIMATED  56*   --   54*  68  76   < >  50*   < >  51*   < >  56*   < >   --    GFRESTBLACK  68   --   65  83  >90   GFR Calc     < >  60*   < >  62   < >  68   < >   --    POTASSIUM  4.1   --   3.7  3.8  4.2   < >  4.3   < >  4.1   < >  4.2   < >  4.4   TSH   --    --    --    --   1.16   --    --    --   0.24*   --    --    < >   --     < > = values in this interval not displayed.      Labs reviewed in EPIC    Reviewed and updated as needed this visit by clinical staff  Tobacco  Allergies  Meds  Problems  Med Hx  Surg Hx  Fam Hx  Soc Hx        Reviewed and updated as needed this visit by Provider  Allergies  Meds  Problems         ROS:  CONSTITUTIONAL:NEGATIVE for fever, chills, change in weight  RESP:NEGATIVE for significant cough or SOB  CV: NEGATIVE for chest pain, palpitations or peripheral edema  MUSCULOSKELETAL: NEGATIVE for significant arthralgias or myalgia  ENDOCRINE: NEGATIVE for temperature intolerance, skin/hair changes and POSITIVE  for HX diabetes    OBJECTIVE:                                                    /82 (BP Location: Right arm, Patient Position: Sitting, Cuff Size: Adult Regular)  Pulse 76  Temp 97.4  F (36.3  C) (Tympanic)  Resp 16  Wt 109 lb (49.4 kg)  LMP  (LMP Unknown)  SpO2 99%  BMI 17.96 kg/m2  Body mass index is 17.96 kg/(m^2).  GENERAL APPEARANCE: healthy, alert and no distress  NECK: no adenopathy, no asymmetry, masses, or scars, thyroid normal to palpation and no bruits  RESP: lungs clear to auscultation - no rales, rhonchi or wheezes  CV: regular rates and rhythm, normal S1 S2, no S3 or S4 and no murmur, click or rub  MS: extremities normal- no gross deformities noted  PSYCH: mentation appears normal and affect normal/bright    Diagnostic test results:  Lab: see below, results pending     ASSESSMENT/PLAN:                                                        ICD-10-CM    1. Controlled type 2 diabetes  mellitus without complication, without long-term current use of insulin (H) E11.9 HEMOGLOBIN A1C     Basic metabolic panel     TSH with free T4 reflex     Albumin Random Urine Quantitative with Creat Ratio   2. Essential hypertension, benign I10 losartan (COZAAR) 100 MG tablet     metoprolol succinate (TOPROL-XL) 100 MG 24 hr tablet   3. Hypercholesterolemia E78.00 Lipid panel reflex to direct LDL Non-fasting     ALT     AST     CANCELED: Lipid panel reflex to direct LDL Fasting   4. Type 2 diabetes mellitus without complication, without long-term current use of insulin (H) E11.9 metFORMIN (GLUCOPHAGE) 500 MG tablet   5. Need for hepatitis C screening test Z11.59 Hepatitis C Screen Reflex to HCV RNA Quant and Genotype       Follow up with Provider - 6 mo    Patient Instructions   Keep watching diet, keeping sweets and carbohydrates controlled      Kodak Preston MD  [facility]

## 2018-06-19 NOTE — MR AVS SNAPSHOT
After Visit Summary   6/19/2018    Radha Uribe    MRN: [id number]           Patient Information     Date Of Birth          1950        Visit Information        Provider Department      6/19/2018 3:00 PM Kodak Preston MD [facility]        Today's Diagnoses     Need for hepatitis C screening test    -  1    Controlled type 2 diabetes mellitus without complication, without long-term current use of insulin (H)        Essential hypertension, benign        Hypercholesterolemia        Type 2 diabetes mellitus without complication, without long-term current use of insulin (H)          Care Instructions    Keep watching diet, keeping sweets and carbohydrates controlled          Follow-ups after your visit        Your next 10 appointments already scheduled     Jun 19, 2018  3:00 PM CDT   SHORT with Kodak Preston MD   [facility] ([facility]    [address]  [address]  [place] [id number] [phone]            Nov 27, 2018 10:30 AM CST   MA DIAGNOSTIC DIGITAL BILATERAL with [id number]   [facility] ([facility] [place])    [address]  2nd Floor  [place] [id number] [phone]           Do not use any powder, lotion or deodorant under your arms or on your breast. If you do, we will ask you to remove it before your exam.  Wear comfortable, two-piece clothing.  If you have any allergies, tell your care team.  Bring any previous mammograms from other facilities or have them mailed to the breast center.  Three-dimensional (3D) mammograms are available at [facility] locations in [facility], [facility], [facility], and [place]. [facility] locations include [place] [facility] in [place]. Benefits of 3D mammograms include: - Improved rate of cancer detection - Decreases your  "chance of having to go back for more tests, which means fewer: - \"False-positive\" results (This means that there is an abnormal area but it isn't cancer.) - Invasive testing procedures, such as a biopsy or surgery - Can provide clearer images of the breast if you have dense breast tissue. 3D mammography is an optional exam that anyone can have with a 2D mammogram. It doesn't replace or take the place of a 2D mammogram. 2D mammograms remain an effective screening test for all women.  Not all insurance companies cover the cost of a 3D mammogram. Check with your insurance.            Nov 27, 2018 11:30 AM CST   (Arrive by 11:15 AM)   Return Visit with South Esparza MD   [facility] ([facility] [place])    [address]  [address]  [place] [place] [address] [phone]              Who to contact     If you have questions or need follow up information about today's clinic visit or your schedule please contact [facility] directly at [phone].  Normal or non-critical lab and imaging results will be communicated to you by [employer], letter or phone within 4 business days after the clinic has received the results. If you do not hear from us within 7 days, please contact the clinic through [employer] or phone. If you have a critical or abnormal lab result, we will notify you by phone as soon as possible.  Submit refill requests through [employer] or call your pharmacy and they will forward the refill request to us. Please allow 3 business days for your refill to be completed.          Additional Information About Your Visit        [facility] [employer] Information     [employer] gives you secure access to your electronic health record. If you see a primary care provider, you can also send messages to your care team and make appointments. If you have questions, please call your primary care clinic.  If you do not have a primary care provider, please call " [phone] and they will assist you.        Care EveryWhere ID     This is your Care EveryWhere ID. This could be used by other organizations to access your [facility] medical records  [id number]        Your Vitals Were     Pulse Temperature Respirations Last Period Pulse Oximetry BMI (Body Mass Index)    76 97.4  F (36.3  C) (Tympanic) 16 (LMP Unknown) 99% 17.96 kg/m2       Blood Pressure from Last 3 Encounters:   06/19/18 148/82   05/22/18 138/76   12/06/17 (!) 162/92    Weight from Last 3 Encounters:   06/19/18 109 lb (49.4 kg)   05/22/18 111 lb 12.8 oz (50.7 kg)   12/06/17 115 lb 8 oz (52.4 kg)              We Performed the Following     Albumin Random Urine Quantitative with Creat Ratio     Basic metabolic panel     HEMOGLOBIN A1C     Hepatitis C Screen Reflex to HCV RNA Quant and Genotype     Lipid panel reflex to direct LDL Fasting     TSH with free T4 reflex          Where to get your medicines      These medications were sent to [facility] [id number]     Phone:  [phone]     losartan 100 MG tablet    metFORMIN 500 MG tablet    metoprolol succinate 100 MG 24 hr tablet          Primary Care Provider Office Phone # Fax #    Kodak Preston [phone] [address] [facility] [address]        Equal Access to Services     RUBEN CABA AH: Hadii elmer ku hadasho Sopaulaali, waaxda luqadaha, qaybta kaalmada adebethanyda, blanca rose . So [facility] [phone].    ATENCIÓN: Si habla español, tiene a eugene disposición servicios gratuitos de asistencia lingüística. Llame al [phone].    We comply with applicable federal civil rights laws and [place] laws. We do not discriminate on the basis of race, color, national origin, age, disability, sex, sexual orientation, or gender identity.            Thank you!     Thank you for choosing [facility]  for your  care. Our goal is always to provide you with excellent care. Hearing back from our patients is one way we can continue to improve our services. Please take a few minutes to complete the written survey that you may receive in the mail after your visit with us. Thank you!             Your Updated Medication List - Protect others around you: Learn how to safely use, store and throw away your medicines at www.disposemymeds.org.          This list is accurate as of 6/19/18  2:41 PM.  Always use your most recent med list.                   Brand Name Dispense Instructions for use Diagnosis    ascorbic acid 250 MG Chew chewable tablet    vitamin C     Take 500 mg by mouth daily        CALCIUM 600 PO      Take 1 tablet by mouth 2 times daily        DIASENSE MULTIVITAMIN PO      Take 1 tablet by mouth daily        hydrochlorothiazide 12.5 MG capsule    MICROZIDE    90 capsule    TAKE 1 CAPSULE (12.5 MG) BY MOUTH DAILY    Hypertension goal BP (blood pressure) < 140/90       letrozole 2.5 MG tablet    FEMARA    90 tablet    Take 1 tablet (2.5 mg) by mouth daily    Breast cancer of upper-inner quadrant of left female breast (H)       losartan 100 MG tablet    COZAAR    90 tablet    Take 1 tablet (100 mg) by mouth daily    Essential hypertension, benign       metFORMIN 500 MG tablet    GLUCOPHAGE    60 tablet    Take 1 tablet (500 mg) by mouth 2 times daily (with meals)    Type 2 diabetes mellitus without complication, without long-term current use of insulin (H)       metoprolol succinate 100 MG 24 hr tablet    TOPROL-XL    90 tablet    Take 1 tablet (100 mg) by mouth daily    Essential hypertension, benign       simvastatin 20 MG tablet    ZOCOR    90 tablet    Take 1 tablet (20 mg) by mouth daily    Hypercholesterolemia       TYLENOL PO      Take by mouth as needed 500 mg as needed        vitamin D 1000 units capsule      Take 2 capsules by mouth daily

## 2018-06-20 LAB
ALT SERPL W P-5'-P-CCNC: 22 U/L (ref 0–50)
ANION GAP SERPL CALCULATED.3IONS-SCNC: 10 MMOL/L (ref 3–14)
AST SERPL W P-5'-P-CCNC: 18 U/L (ref 0–45)
BUN SERPL-MCNC: 26 MG/DL (ref 7–30)
CALCIUM SERPL-MCNC: 9.3 MG/DL (ref 8.5–10.1)
CHLORIDE SERPL-SCNC: 101 MMOL/L (ref 94–109)
CHOLEST SERPL-MCNC: 161 MG/DL
CO2 SERPL-SCNC: 25 MMOL/L (ref 20–32)
CREAT SERPL-MCNC: 0.97 MG/DL (ref 0.52–1.04)
CREAT UR-MCNC: 42 MG/DL
GFR SERPL CREATININE-BSD FRML MDRD: 57 ML/MIN/1.7M2
GLUCOSE SERPL-MCNC: 111 MG/DL (ref 70–99)
HCV AB SERPL QL IA: NONREACTIVE
HDLC SERPL-MCNC: 42 MG/DL
LDLC SERPL CALC-MCNC: 73 MG/DL
MICROALBUMIN UR-MCNC: <5 MG/L
MICROALBUMIN/CREAT UR: NORMAL MG/G CR (ref 0–25)
NONHDLC SERPL-MCNC: 119 MG/DL
POTASSIUM SERPL-SCNC: 3.7 MMOL/L (ref 3.4–5.3)
SODIUM SERPL-SCNC: 136 MMOL/L (ref 133–144)
TRIGL SERPL-MCNC: 228 MG/DL
TSH SERPL DL<=0.005 MIU/L-ACNC: 1.23 MU/L (ref 0.4–4)

## 2018-11-02 DIAGNOSIS — I10 HYPERTENSION GOAL BP (BLOOD PRESSURE) < 140/90: ICD-10-CM

## 2018-11-02 RX ORDER — HYDROCHLOROTHIAZIDE 12.5 MG/1
CAPSULE ORAL
Qty: 90 CAPSULE | Refills: 1 | Status: SHIPPED | OUTPATIENT
Start: 2018-11-02 | End: 2019-01-09

## 2018-11-02 NOTE — TELEPHONE ENCOUNTER
"HYDROCHLOROTHIAZIDE 12.5 MG CP  Last Written Prescription Date:  02/16/18  Last Fill Quantity: 90,  # refills: 1   Last office visit: 6/19/2018 with prescribing provider:  06/19/18   Future Office Visit:    Requested Prescriptions   Pending Prescriptions Disp Refills     hydrochlorothiazide (MICROZIDE) 12.5 MG capsule [Pharmacy Med Name: HYDROCHLOROTHIAZIDE 12.5 MG CP] 90 capsule 1     Sig: TAKE 1 CAPSULE (12.5 MG) BY MOUTH DAILY. NEED BLOOD PRESSURE CHECK    Diuretics (Including Combos) Protocol Failed    11/2/2018  1:28 AM       Failed - Blood pressure under 140/90 in past 12 months    BP Readings from Last 3 Encounters:   06/19/18 148/82   05/22/18 138/76   12/06/17 (!) 162/92                Passed - Recent (12 mo) or future (30 days) visit within the authorizing provider's specialty    Patient had office visit in the last 12 months or has a visit in the next 30 days with authorizing provider or within the authorizing provider's specialty.  See \"Patient Info\" tab in inbasket, or \"Choose Columns\" in Meds & Orders section of the refill encounter.             Passed - Patient is age 18 or older       Passed - No active pregancy on record       Passed - Normal serum creatinine on file in past 12 months    Recent Labs   Lab Test  06/19/18   1439   CR  0.97             Passed - Normal serum potassium on file in past 12 months    Recent Labs   Lab Test  06/19/18   1439   POTASSIUM  3.7                   Passed - Normal serum sodium on file in past 12 months    Recent Labs   Lab Test  06/19/18   1439   NA  136             Passed - No positive pregnancy test in past 12 months          "

## 2018-11-25 NOTE — PROGRESS NOTES
HISTORY OF PRESENT ILLNESS: Radha Uribe is a 67-year-old woman who was referred to our Oncology Clinic by Dr. Crespo for evaluation of a newly diagnosed breast cancer. Radha was in her usual health until last month, when she was asked to do a mammogram by her primary care doctor, Dr. Preston. She has not been doing mammogram for the last 20 years, and most of the time she does self breast examinations. She noticed a mass, which was not painful, in her left breast, and immediately went for a mammogram. The mammogram was done on 09/13/2013, which revealed a 2.5 x 2 x 0.8 cm mass, so she had an ultrasound-guided core needle biopsy on 09/17, which revealed infiltrating ductal carcinoma, Powell grade 3 (score 3 for tubule formation, 3 for nuclear atypia, 3 for mitosis). No angiolymphatic invasion was identified. There was a focal necrosis of invasive carcinoma identified, and no definite carcinoma in situ was identified. The greatest length of the needle biopsy at the left breast in the 2 o'clock position showed benign breast tissue with fibrocystic changes. There is a focal intraductal microcalcification identified. The tumor was 15-20% weakly to moderately positive for estrogen receptors and 5% positive for progesterone receptors. HER-2 was amplified. The ratio of HER-2/CEP17 signals were more than 5.9 by FISH analysis. The signal/nucleus was more than 19.4. She was seen by Dr. Crespo at the Breast Clinic on 09/24, who recommended to do an MRI of the breast due to density of the breast tissues, as well as referred her to the Breast Oncology Clinic for further staging workup and management of her breast cancer. MRI of the breast showed BI-RADS 6 and mass in the left breast at the 11 o'clock position that measures about 1.6 x 1.6 x 1.6 cm. The longest diameter in sagittal reconstruction was 1.9 cm. There is no evidence of axillary lymph node involvement on the MRI.   She had neoadjuvant chemotherapy , mastectomy and SNL  sampling which showed pathologic CR and no disease in the LN. This was followed by radiation therapy. She received 5000 cGy in 25 fractions, completed on 5/23/2014. She continued on treatment with herceptin for 13 cycles. This was completed on 12/9/2014.    FOLLOWUP NOTE       INTERVAL HISTORY:  Radha returns to clinic, is feeling generally well.  She is working full time.  She has no pain, no fatigue, no depression but has significant anxiety.  She is taking metformin for adult-onset diabetes.  She did have a mammogram today that showed benign findings.  She has been smoking but trying to cut down.  She smokes a pack every other day.  She does not want CT surveillance of the chest for lung cancer screening despite her extensive smoking history.  She also does not want to repeat her DEXA scan.  She does have osteoporosis.      REVIEW OF SYSTEMS:  A 10-point review of systems is otherwise negative.  She is not eating a healthful diet.  She does do some walking.  She does not drink alcohol.  She is taking calcium and vitamin D.      PHYSICAL EXAMINATION:   VITAL SIGNS:  Blood pressure 143/78, temperature 97.4, pulse 80, respirations 16, O2 sat 99% on room air, height 1.7 m and weight 53 kg.   GENERAL:  Radha appeared generally well.  She has no alopecia.   HEENT:  Oropharynx is without lesions.   LYMPH:  There is no palpable cervical, supraclavicular, subclavicular or axillary lymphadenopathy.   BREASTS:  Examination of the right breast reveals no masses. The left breast is without masses.  In the left breast, there is a well-healed incision 11-o'clock position, 2 fingerbreadths from the nipple-areolar complex, without erythema or masses.  The left axillary incision is well-healed without erythema or masses. No masses in either breast.   LUNGS:  Clear to percussion and auscultation.   HEART:  Regular rate and rhythm, S1, S2. Correct II/VI systolic murmur at the apex radiating to the sternal border.   ABDOMEN:  Soft,  nontender, without hepatosplenomegaly.   EXTREMITIES:  Without edema.   PSYCHIATRIC:  Mood and affect were normal.      LABORATORY DATA:  None today.  CBC and CMP 05/22/2018 remarkable for a glucose of 146 and a calculated GFR of 56 mL per minute.      IMAGING:  Mammogram today showed benign findings by report.      ASSESSMENT AND PLAN:   1.  Radha Uribe is a 68-year-old woman who was diagnosed in 2013 with a stage IIA, T2N2MX invasive ductal carcinoma of the left breast.  Tumor was positive for estrogen receptor in 15%-20% of the cells, TX positive in 5% of cells, and HER2 was amplified.  The breast mass measured 2.5 x 2 x 0.8 cm in size, 11-o'clock position.  She was treated with neoadjuvant Taxol and Herceptin for 12 weeks followed by dose-dense AC for 4 cycles.  She had a pathologic complete response in the lumpectomy specimen.  She completed radiation in 04/2014.  She tolerated the treatment quite well.  She began letrozole and tolerated it quite well with no hot flashes, myalgias or arthralgias.  The plan is to continue the letrozole to complete 10 years of therapy.   2.  Continue yearly mammography for surveillance.   3.  Thyroid mass.  She did have a thyroid biopsy which showed benign results.  She will follow up with Dr. Hernandez.   4.  DEXA scan showed osteopenia, bordering on osteoporosis with a most valid negative T score -2.4 on 04/30/2014.  Radha does not want to repeat the DEXA scan or pursue this finding.  She does not want treatment with Reclast.   5.  Smoking cessation was again discussed.  She refuses to stop smoking.  She also refuses a surveillance low-dose chest CT for lung cancer screening.   6.  Mild mitral regurgitation.  She refuses cardiology referral.   7.  Followup.  Radha will see SAHARA in 6 months and me in a year with a mammogram. Follow up with Maria Teresa May 28 with CBC, CMP.  Follow up with me Nov. 26 with mammogram, CBC, CMP.      Thank you for allowing us to continue to participate  in Radha Uribe's care.      South Esparza MD       [facility]       I spent 30 minutes with the patient more than 50% of which was in counseling and coordination of care.

## 2018-11-27 ENCOUNTER — ONCOLOGY VISIT (OUTPATIENT)
Dept: ONCOLOGY | Facility: CLINIC | Age: 68
End: 2018-11-27
Attending: INTERNAL MEDICINE
Payer: COMMERCIAL

## 2018-11-27 ENCOUNTER — RADIANT APPOINTMENT (OUTPATIENT)
Dept: MAMMOGRAPHY | Facility: CLINIC | Age: 68
End: 2018-11-27
Payer: COMMERCIAL

## 2018-11-27 VITALS
BODY MASS INDEX: 18.73 KG/M2 | HEIGHT: 66 IN | DIASTOLIC BLOOD PRESSURE: 78 MMHG | TEMPERATURE: 97.4 F | SYSTOLIC BLOOD PRESSURE: 143 MMHG | RESPIRATION RATE: 16 BRPM | HEART RATE: 80 BPM | WEIGHT: 116.56 LBS | OXYGEN SATURATION: 99 %

## 2018-11-27 DIAGNOSIS — C50.212 CARCINOMA OF UPPER-INNER QUADRANT OF LEFT BREAST IN FEMALE, ESTROGEN RECEPTOR POSITIVE (H): Primary | ICD-10-CM

## 2018-11-27 DIAGNOSIS — C50.212 CARCINOMA OF UPPER-INNER QUADRANT OF LEFT FEMALE BREAST (H): ICD-10-CM

## 2018-11-27 DIAGNOSIS — Z17.0 CARCINOMA OF UPPER-INNER QUADRANT OF LEFT BREAST IN FEMALE, ESTROGEN RECEPTOR POSITIVE (H): Primary | ICD-10-CM

## 2018-11-27 PROCEDURE — 99213 OFFICE O/P EST LOW 20 MIN: CPT | Mod: ZP | Performed by: INTERNAL MEDICINE

## 2018-11-27 PROCEDURE — G0463 HOSPITAL OUTPT CLINIC VISIT: HCPCS | Mod: ZF

## 2018-11-27 ASSESSMENT — PAIN SCALES - GENERAL: PAINLEVEL: NO PAIN (0)

## 2018-11-27 NOTE — NURSING NOTE
"Oncology Rooming Note    November 27, 2018 11:49 AM   Radha Uribe is a 68 year old female who presents for:    Chief Complaint   Patient presents with     Oncology Clinic Visit     Return: Breast CA     Initial Vitals: /78  Pulse 80  Temp 97.4  F (36.3  C) (Oral)  Resp 16  Ht 1.664 m (5' 5.5\")  Wt 52.9 kg (116 lb 9 oz)  LMP  (LMP Unknown)  SpO2 99%  Breastfeeding? No  BMI 19.1 kg/m2 Estimated body mass index is 19.1 kg/(m^2) as calculated from the following:    Height as of this encounter: 1.664 m (5' 5.5\").    Weight as of this encounter: 52.9 kg (116 lb 9 oz). Body surface area is 1.56 meters squared.  No Pain (0) Comment: Data Unavailable   No LMP recorded (lmp unknown). Patient has had a hysterectomy.  Allergies reviewed: Yes  Medications reviewed: Yes    Medications: Medication refills not needed today.  Pharmacy name entered into [employer]: [id number] [place], [place] - [address] [place] [address]    Clinical concerns: no new concerns today Dr. Esparza was notified.    10 minutes for nursing intake (face to face time)     Antwan Brian CMA              "

## 2018-11-27 NOTE — LETTER
11/27/2018       RE: Radha Uribe  [address]  [place] [address]     Dear Colleague,    Thank you for referring your patient, Radha Uribe, to the [facility]. Please see a copy of my visit note below.    HISTORY OF PRESENT ILLNESS: Radha Uribe is a 67-year-old woman who was referred to our Oncology Clinic by Dr. Crespo for evaluation of a newly diagnosed breast cancer. Radha was in her usual health until last month, when she was asked to do a mammogram by her primary care doctor, Dr. Preston. She has not been doing mammogram for the last 20 years, and most of the time she does self breast examinations. She noticed a mass, which was not painful, in her left breast, and immediately went for a mammogram. The mammogram was done on 09/13/2013, which revealed a 2.5 x 2 x 0.8 cm mass, so she had an ultrasound-guided core needle biopsy on 09/17, which revealed infiltrating ductal carcinoma, Con[place] grade 3 (score 3 for tubule formation, 3 for nuclear atypia, 3 for mitosis). No angiolymphatic invasion was identified. There was a focal necrosis of invasive carcinoma identified, and no definite carcinoma in situ was identified. The greatest length of the needle biopsy at the left breast in the 2 o'clock position showed benign breast tissue with fibrocystic changes. There is a focal intraductal microcalcification identified. The tumor was 15-20% weakly to moderately positive for estrogen receptors and 5% positive for progesterone receptors. HER-2 was amplified. The ratio of HER-2/CEP17 signals were more than 5.9 by FISH analysis. The signal/nucleus was more than 19.4. She was seen by Dr. Crespo at the Breast Clinic on 09/24, who recommended to do an MRI of the breast due to density of the breast tissues, as well as referred her to the Breast Oncology Clinic for further staging workup and management of her breast cancer. MRI of the breast showed BI-RADS 6 and mass in the left breast at the  11 o'clock position that measures about 1.6 x 1.6 x 1.6 cm. The longest diameter in sagittal reconstruction was 1.9 cm. There is no evidence of axillary lymph node involvement on the MRI.   She had neoadjuvant chemotherapy , mastectomy and SNL sampling which showed pathologic CR and no disease in the LN. This was followed by radiation therapy. She received 5000 cGy in 25 fractions, completed on 5/23/2014. She continued on treatment with herceptin for 13 cycles. This was completed on 12/9/2014.    FOLLOWUP NOTE       INTERVAL HISTORY:  Radha returns to clinic, is feeling generally well.  She is working full time.  She has no pain, no fatigue, no depression but has significant anxiety.  She is taking metformin for adult-onset diabetes.  She did have a mammogram today that showed benign findings.  She has been smoking but trying to cut down.  She smokes a pack every other day.  She does not want CT surveillance of the chest for lung cancer screening despite her extensive smoking history.  She also does not want to repeat her DEXA scan.  She does have osteoporosis.      REVIEW OF SYSTEMS:  A 10-point review of systems is otherwise negative.  She is not eating a healthful diet.  She does do some walking.  She does not drink alcohol.  She is taking calcium and vitamin D.      PHYSICAL EXAMINATION:   VITAL SIGNS:  Blood pressure 143/78, temperature 97.4, pulse 80, respirations 16, O2 sat 99% on room air, height 1.7 m and weight 53 kg.   GENERAL:  Radha appeared generally well.  She has no alopecia.   HEENT:  Oropharynx is without lesions.   LYMPH:  There is no palpable cervical, supraclavicular, subclavicular or axillary lymphadenopathy.   BREASTS:  Examination of the right breast reveals no masses. The left breast is without masses.  In the left breast, there is a well-healed incision 11-o'clock position, 2 fingerbreadths from the nipple-areolar complex, without erythema or masses.  The left axillary incision is  well-healed without erythema or masses. No masses in either breast.   LUNGS:  Clear to percussion and auscultation.   HEART:  Regular rate and rhythm, S1, S2. Correct II/VI systolic murmur at the apex radiating to the sternal border.   ABDOMEN:  Soft, nontender, without hepatosplenomegaly.   EXTREMITIES:  Without edema.   PSYCHIATRIC:  Mood and affect were normal.      LABORATORY DATA:  None today.  CBC and CMP 05/22/2018 remarkable for a glucose of 146 and a calculated GFR of 56 mL per minute.      IMAGING:  Mammogram today showed benign findings by report.      ASSESSMENT AND PLAN:   1.  Radha Uribe is a 68-year-old woman who was diagnosed in 2013 with a stage IIA, T2N2MX invasive ductal carcinoma of the left breast.  Tumor was positive for estrogen receptor in 15%-20% of the cells, [place] positive in 5% of cells, and HER2 was amplified.  The breast mass measured 2.5 x 2 x 0.8 cm in size, 11-o'clock position.  She was treated with neoadjuvant Taxol and Herceptin for 12 weeks followed by dose-dense AC for 4 cycles.  She had a pathologic complete response in the lumpectomy specimen.  She completed radiation in 04/2014.  She tolerated the treatment quite well.  She began letrozole and tolerated it quite well with no hot flashes, myalgias or arthralgias.  The plan is to continue the letrozole to complete 10 years of therapy.   2.  Continue yearly mammography for surveillance.   3.  Thyroid mass.  She did have a thyroid biopsy which showed benign results.  She will follow up with Dr. Hernandez.   4.  DEXA scan showed osteopenia, bordering on osteoporosis with a most valid negative T score -2.4 on 04/30/2014.  Radha does not want to repeat the DEXA scan or pursue this finding.  She does not want treatment with Reclast.   5.  Smoking cessation was again discussed.  She refuses to stop smoking.  She also refuses a surveillance low-dose chest CT for lung cancer screening.   6.  Mild mitral regurgitation.  She refuses  cardiology referral.   7.  Followup.  Radha will see SAHARA in 6 months and me in a year with a mammogram. Follow up with Maria Teersa May 28 with CBC, CMP.  Follow up with me Nov. 26 with mammogram, CBC, CMP.      Thank you for allowing us to continue to participate in Radha Uribe's care.      South Esparza MD       [facility]       I spent 30 minutes with the patient more than 50% of which was in counseling and coordination of care.        Again, thank you for allowing me to participate in the care of your patient.      Sincerely,    South Esparza MD

## 2018-11-27 NOTE — MR AVS SNAPSHOT
After Visit Summary   11/27/2018    Radha Uribe    MRN: [id number]           Patient Information     Date Of Birth          1950        Visit Information        Provider Department      11/27/2018 11:30 AM South Esparza MD [facility]        Today's Diagnoses     Carcinoma of upper-inner quadrant of left breast in female, estrogen receptor positive (H)    -  1       Follow-ups after your visit        Follow-up notes from your care team     Return in about 1 year (around 11/26/2019).      Your next 10 appointments already scheduled     May 28, 2019 10:45 AM CDT   [facility] Draw with  [facility] LAB DRAW   [facility] ([facility])    [address]  [address]  [facility] [address]   [phone]            May 28, 2019 11:20 AM CDT   (Arrive by 11:05 AM)   Return Visit with WESLY Bhagat   [facility] ([facility])    [address] [place] [address]  [address]  [facility] [address]   [phone]            Nov 26, 2019 10:00 AM CST   [facility] Lab Draw with  MASONIC LAB DRAW   [facility] ([facility])    [address]  [address]  [facility] [address]   [phone]            Nov 26, 2019 10:30 AM CST   MA DIAGNOSTIC DIGITAL BILATERAL with [id number]   [facility] ([facility])    [address] [place] [address], 2nd Floor  [facility] [address]   [phone]           How do I prepare for my exam? (Food and drink instructions) No Food and Drink Restrictions.  How do I prepare for my exam? (Other instructions) Do not use any powder, lotion or deodorant under your arms or on your breast. If you do, we will ask you to remove it before your exam.  What should I wear: Wear comfortable, two-piece clothing.  How long does the exam take: Most scans will take 15 minutes.  What should I bring:  "Bring any previous mammograms from other facilities or have them mailed to the breast center.  Do I need a :  No  is needed.  What do I need to tell my doctor: If you have any allergies, tell your care team.  What should I do after the exam: No restrictions, You may resume normal activities.  What is this test: This test is an x-ray of the breast to look for breast disease. The breast is pressed between two plates to flatten and spread the tissue. An X-ray is taken of the breast from different angles.  Who should I call with questions: If you have any questions, please call the Imaging Department where you will have your exam. Directions, parking instructions, and other information is available on our website, [email or web address].  Other information about my exam Three-dimensional (3D) mammograms are available at [place] locations in [facility], [place], and [place]. [facility] locations include [place] and the [facility] [place] in [place].  Benefits of 3D mammograms include: * Improved rate of cancer detection * Decreases your chance of having to go back for more tests, which means fewer: * \"False-positive\" results (This means that there is an abnormal area but it isn't cancer.) * Invasive testing procedures, such as a biopsy or surgery * Can provide clearer images of the breast if you have dense breast tissue.  *3D mammography is an optional exam that anyone can have with a 2D mammogram. It doesn't replace or take the place of a 2D mammogram. 2D mammograms remain an effective screening test for all women.  Not all insurance companies cover the cost of a 3D mammogram. Check with your insurance.            Nov 26, 2019 11:00 AM CST   (Arrive by 10:45 AM)   Return Visit with South Esparza MD   [facility] ([facility])    [address]  [address]  [facility] [place] [address] " "  [phone]              Who to contact     If you have questions or need follow up information about today's clinic visit or your schedule please contact [facility] directly at [phone].  Normal or non-critical lab and imaging results will be communicated to you by MyChart, letter or phone within 4 business days after the clinic has received the results. If you do not hear from us within 7 days, please contact the clinic through MyChart or phone. If you have a critical or abnormal lab result, we will notify you by phone as soon as possible.  Submit refill requests through [employer] or call your pharmacy and they will forward the refill request to us. Please allow 3 business days for your refill to be completed.          Additional Information About Your Visit        [facility] Information     [employer] gives you secure access to your electronic health record. If you see a primary care provider, you can also send messages to your care team and make appointments. If you have questions, please call your primary care clinic.  If you do not have a primary care provider, please call [phone] and they will assist you.        Care EveryWhere ID     This is your Care EveryWhere ID. This could be used by other organizations to access your [place] medical records  [id number]        Your Vitals Were     Pulse Temperature Respirations Height Last Period Pulse Oximetry    80 97.4  F (36.3  C) (Oral) 16 1.664 m (5' 5.5\") (LMP Unknown) 99%    Breastfeeding? BMI (Body Mass Index)                No 19.1 kg/m2           Blood Pressure from Last 3 Encounters:   11/27/18 143/78   06/19/18 148/82   05/22/18 138/76    Weight from Last 3 Encounters:   11/27/18 52.9 kg (116 lb 9 oz)   06/19/18 49.4 kg (109 lb)   05/22/18 50.7 kg (111 lb 12.8 oz)               Primary Care Provider Office Phone # Fax #    Kodak Preston [phone] [address]  [place] [address]        Equal Access " to Services     RUBEN CABA : Binu Garrison, walico barrios, qaybta kaalmablanca mcgovern. So [facility] [phone].    ATENCIÓN: Si habla jorge, tiene a eugene disposición servicios gratuitos de asistencia lingüística. Llame al [phone].    We comply with applicable federal civil rights laws and [place] laws. We do not discriminate on the basis of race, color, national origin, age, disability, sex, sexual orientation, or gender identity.            Thank you!     Thank you for choosing [facility]  for your care. Our goal is always to provide you with excellent care. Hearing back from our patients is one way we can continue to improve our services. Please take a few minutes to complete the written survey that you may receive in the mail after your visit with us. Thank you!             Your Updated Medication List - Protect others around you: Learn how to safely use, store and throw away your medicines at www.disposemymeds.org.          This list is accurate as of 11/27/18 11:59 PM.  Always use your most recent med list.                   Brand Name Dispense Instructions for use Diagnosis    ascorbic acid 250 MG Chew chewable tablet    vitamin C     Take 500 mg by mouth daily        CALCIUM 600 PO      Take 1 tablet by mouth 2 times daily        DIASENSE MULTIVITAMIN PO      Take 1 tablet by mouth daily        hydrochlorothiazide 12.5 MG capsule    MICROZIDE    90 capsule    TAKE 1 CAPSULE (12.5 MG) BY MOUTH DAILY. NEED BLOOD PRESSURE CHECK    Hypertension goal BP (blood pressure) < 140/90       letrozole 2.5 MG tablet    FEMARA    90 tablet    Take 1 tablet (2.5 mg) by mouth daily    Breast cancer of upper-inner quadrant of left female breast (H)       losartan 100 MG tablet    COZAAR    90 tablet    Take 1 tablet (100 mg) by mouth daily    Essential hypertension, benign       metFORMIN 500 MG tablet    GLUCOPHAGE    60 tablet    Take 1  tablet (500 mg) by mouth 2 times daily (with meals)    Type 2 diabetes mellitus without complication, without long-term current use of insulin (H)       metoprolol succinate  MG 24 hr tablet    TOPROL-XL    90 tablet    Take 1 tablet (100 mg) by mouth daily    Essential hypertension, benign       * simvastatin 20 MG tablet    ZOCOR    90 tablet    Take 1 tablet (20 mg) by mouth daily    Hypercholesterolemia       * simvastatin 20 MG tablet    ZOCOR    90 tablet    TAKE 1 TABLET (20 MG) BY MOUTH DAILY    Hypercholesterolemia       TYLENOL PO      Take by mouth as needed 500 mg as needed        vitamin D 1000 units capsule      Take 2 capsules by mouth daily        * Notice:  This list has 2 medication(s) that are the same as other medications prescribed for you. Read the directions carefully, and ask your doctor or other care provider to review them with you.

## 2019-01-09 ENCOUNTER — OFFICE VISIT (OUTPATIENT)
Dept: FAMILY MEDICINE | Facility: CLINIC | Age: 69
End: 2019-01-09
Payer: COMMERCIAL

## 2019-01-09 VITALS
WEIGHT: 124.5 LBS | SYSTOLIC BLOOD PRESSURE: 138 MMHG | DIASTOLIC BLOOD PRESSURE: 84 MMHG | BODY MASS INDEX: 20.4 KG/M2 | TEMPERATURE: 97.3 F | HEART RATE: 70 BPM | OXYGEN SATURATION: 99 % | RESPIRATION RATE: 16 BRPM

## 2019-01-09 DIAGNOSIS — C50.212 CARCINOMA OF UPPER-INNER QUADRANT OF LEFT BREAST IN FEMALE, ESTROGEN RECEPTOR POSITIVE (H): ICD-10-CM

## 2019-01-09 DIAGNOSIS — E11.9 TYPE 2 DIABETES MELLITUS WITHOUT COMPLICATION, WITHOUT LONG-TERM CURRENT USE OF INSULIN (H): ICD-10-CM

## 2019-01-09 DIAGNOSIS — Z17.0 CARCINOMA OF UPPER-INNER QUADRANT OF LEFT BREAST IN FEMALE, ESTROGEN RECEPTOR POSITIVE (H): ICD-10-CM

## 2019-01-09 DIAGNOSIS — E11.9 CONTROLLED TYPE 2 DIABETES MELLITUS WITHOUT COMPLICATION, WITHOUT LONG-TERM CURRENT USE OF INSULIN (H): Primary | ICD-10-CM

## 2019-01-09 DIAGNOSIS — E78.00 HYPERCHOLESTEROLEMIA: ICD-10-CM

## 2019-01-09 DIAGNOSIS — I10 HYPERTENSION GOAL BP (BLOOD PRESSURE) < 140/90: ICD-10-CM

## 2019-01-09 DIAGNOSIS — I10 ESSENTIAL HYPERTENSION, BENIGN: ICD-10-CM

## 2019-01-09 LAB — HBA1C MFR BLD: 6.7 % (ref 0–5.6)

## 2019-01-09 PROCEDURE — 80061 LIPID PANEL: CPT | Performed by: FAMILY MEDICINE

## 2019-01-09 PROCEDURE — 84460 ALANINE AMINO (ALT) (SGPT): CPT | Performed by: FAMILY MEDICINE

## 2019-01-09 PROCEDURE — 36415 COLL VENOUS BLD VENIPUNCTURE: CPT | Performed by: FAMILY MEDICINE

## 2019-01-09 PROCEDURE — 84443 ASSAY THYROID STIM HORMONE: CPT | Performed by: FAMILY MEDICINE

## 2019-01-09 PROCEDURE — 83036 HEMOGLOBIN GLYCOSYLATED A1C: CPT | Performed by: FAMILY MEDICINE

## 2019-01-09 PROCEDURE — 80048 BASIC METABOLIC PNL TOTAL CA: CPT | Performed by: FAMILY MEDICINE

## 2019-01-09 PROCEDURE — 82043 UR ALBUMIN QUANTITATIVE: CPT | Performed by: FAMILY MEDICINE

## 2019-01-09 PROCEDURE — 84450 TRANSFERASE (AST) (SGOT): CPT | Performed by: FAMILY MEDICINE

## 2019-01-09 PROCEDURE — 99214 OFFICE O/P EST MOD 30 MIN: CPT | Performed by: FAMILY MEDICINE

## 2019-01-09 RX ORDER — SIMVASTATIN 20 MG
20 TABLET ORAL DAILY
Qty: 90 TABLET | Refills: 2 | Status: SHIPPED | OUTPATIENT
Start: 2019-01-09 | End: 2020-02-06

## 2019-01-09 RX ORDER — METOPROLOL SUCCINATE 100 MG/1
100 TABLET, EXTENDED RELEASE ORAL DAILY
Qty: 90 TABLET | Refills: 1 | Status: SHIPPED | OUTPATIENT
Start: 2019-01-09 | End: 2019-08-15

## 2019-01-09 RX ORDER — HYDROCHLOROTHIAZIDE 12.5 MG/1
CAPSULE ORAL
Qty: 90 CAPSULE | Refills: 1 | Status: SHIPPED | OUTPATIENT
Start: 2019-01-09 | End: 2019-08-15

## 2019-01-09 RX ORDER — LOSARTAN POTASSIUM 100 MG/1
100 TABLET ORAL DAILY
Qty: 90 TABLET | Refills: 1 | Status: SHIPPED | OUTPATIENT
Start: 2019-01-09 | End: 2019-10-16

## 2019-01-09 NOTE — PROGRESS NOTES
SUBJECTIVE:   Radha Uribe is a 68 year old female who presents to clinic today for the following health issues:    Diabetes Follow-up      Patient is checking blood sugars: not at all    Diabetic concerns: None     Symptoms of hypoglycemia (low blood sugar): shaky     Paresthesias (numbness or burning in feet) or sores: No     Date of last diabetic eye exam: 2012    BP Readings from Last 2 Encounters:   01/09/19 138/84   11/27/18 143/78     Hemoglobin A1C (%)   Date Value   06/19/2018 6.3 (H)   12/06/2017 6.1 (H)     LDL Cholesterol Calculated (mg/dL)   Date Value   06/19/2018 73     LDL Cholesterol Direct (mg/dL)   Date Value   05/10/2017 106 (H)   05/18/2016 104 (H)       Diabetes Management Resources      Amount of exercise or physical activity: Walks a lot. Takes the city bus    Problems taking medications regularly: No    Medication side effects: none    Diet: regular (no restrictions)        Hyperlipidemia Follow-Up      Rate your low fat/cholesterol diet?: good    Taking statin?  Yes, no muscle aches from statin    Other lipid medications/supplements?:  none    Hypertension Follow-up      Outpatient blood pressures are not being checked.    Low Salt Diet: no added salt      Problem list and histories reviewed & adjusted, as indicated.  Additional history: as documented    Recent Labs   Lab Test 06/19/18  1440 06/19/18  1439 05/22/18  1009 12/06/17  0743 10/17/17  1423  05/10/17  1544  05/18/16  1456  01/19/12  1327   A1C  --  6.3*  --  6.1*  --   --  6.2*  --  6.5*   < > 5.7   LDL 73  --   --   --   --   --  106*  --  104*   < > 125.4*   HDL 42*  --   --   --   --   --   --   --   --   --  48   TRIG 228*  --   --   --   --   --   --   --   --   --  68   ALT 22  --  19  --  27   < > 54*   < > 29   < > 36.0   CR  --  0.97 0.99  --  1.02   < > 0.76   < > 1.10*   < > 0.6   GFRESTIMATED  --  57* 56*  --  54*   < > 76   < > 50*   < >  --    GFRESTBLACK  --  69 68  --  65   < > >90   GFR Calc      < > 60*   < >  --    POTASSIUM  --  3.7 4.1  --  3.7   < > 4.2   < > 4.3   < > 4.4   TSH  --  1.23  --   --   --   --  1.16  --   --    < >  --     < > = values in this interval not displayed.      Labs reviewed in EPIC    Reviewed and updated as needed this visit by clinical staff  Tobacco  Allergies  Meds  Med Hx  Surg Hx  Fam Hx  Soc Hx      Reviewed and updated as needed this visit by Provider         ROS:  CONSTITUTIONAL:NEGATIVE for fever, chills, change in weight  RESP:NEGATIVE for significant cough or SOB  BREAST: NEGATIVE for masses, tenderness or discharge  CV: NEGATIVE for chest pain, palpitations or peripheral edema  ENDOCRINE: NEGATIVE for temperature intolerance, skin/hair changes and POSITIVE  for HX diabetes    OBJECTIVE:                                                    /84 (BP Location: Right arm, Patient Position: Sitting, Cuff Size: Adult Regular)   Pulse 70   Temp 97.3  F (36.3  C) (Tympanic)   Resp 16   Wt 56.5 kg (124 lb 8 oz)   LMP  (LMP Unknown)   SpO2 99%   BMI 20.40 kg/m    Body mass index is 20.4 kg/m .  GENERAL APPEARANCE: healthy, alert and no distress  RESP: lungs clear to auscultation - no rales, rhonchi or wheezes  CV: regular rates and rhythm, normal S1 S2, no S3 or S4 and no murmur, click or rub  MS: extremities normal- no gross deformities noted  NEURO: Normal strength and tone, sensory exam grossly normal, mentation intact and speech normal      Diagnostic Test Results:  Lab: see below, results pending     ASSESSMENT/PLAN:                                                        ICD-10-CM    1. Controlled type 2 diabetes mellitus without complication, without long-term current use of insulin (H) E11.9 Hemoglobin A1c     Basic metabolic panel     TSH with free T4 reflex     Albumin Random Urine Quantitative with Creat Ratio     aspirin (ASA) 81 MG EC tablet   2. Hypercholesterolemia E78.00 Lipid panel reflex to direct LDL Fasting     ALT     AST     simvastatin  (ZOCOR) 20 MG tablet   3. Essential hypertension, benign I10 metoprolol succinate ER (TOPROL-XL) 100 MG 24 hr tablet     losartan (COZAAR) 100 MG tablet   4. Type 2 diabetes mellitus without complication, without long-term current use of insulin (H) E11.9 metFORMIN (GLUCOPHAGE) 500 MG tablet   5. Hypertension goal BP (blood pressure) < 140/90 I10 hydrochlorothiazide (MICROZIDE) 12.5 MG capsule   6. Carcinoma of upper-inner quadrant of left breast in female, estrogen receptor positive (H) C50.212     Z17.0        Follow up with Provider - 6 mo   Patient Instructions   Watch diet, limit carbohydrates       Kodak Preston MD  [facility]

## 2019-01-10 DIAGNOSIS — I10 ESSENTIAL HYPERTENSION, BENIGN: Chronic | ICD-10-CM

## 2019-01-10 NOTE — TELEPHONE ENCOUNTER
"METOPROLOL SUCC  MG TAB  Last Written Prescription Date:  01/09/19  Last Fill Quantity: 90,  # refills: 1   Last office visit: 1/9/2019 with prescribing provider:  01/09/19   Future Office Visit:    Requested Prescriptions   Pending Prescriptions Disp Refills     metoprolol succinate ER (TOPROL-XL) 100 MG 24 hr tablet [Pharmacy Med Name: METOPROLOL SUCC  MG TAB] 90 tablet 1     Sig: TAKE 1 TABLET BY MOUTH EVERY DAY    Beta-Blockers Protocol Passed - 1/10/2019 11:03 AM       Passed - Blood pressure under 140/90 in past 12 months    BP Readings from Last 3 Encounters:   01/09/19 138/84   11/27/18 143/78   06/19/18 148/82                Passed - Patient is age 6 or older       Passed - Recent (12 mo) or future (30 days) visit within the authorizing provider's specialty    Patient had office visit in the last 12 months or has a visit in the next 30 days with authorizing provider or within the authorizing provider's specialty.  See \"Patient Info\" tab in inbasket, or \"Choose Columns\" in Meds & Orders section of the refill encounter.             Passed - Medication is active on med list          "

## 2019-01-11 LAB
ALT SERPL W P-5'-P-CCNC: 22 U/L (ref 0–50)
ANION GAP SERPL CALCULATED.3IONS-SCNC: 12 MMOL/L (ref 3–14)
AST SERPL W P-5'-P-CCNC: 18 U/L (ref 0–45)
BUN SERPL-MCNC: 19 MG/DL (ref 7–30)
CALCIUM SERPL-MCNC: 9.3 MG/DL (ref 8.5–10.1)
CHLORIDE SERPL-SCNC: 101 MMOL/L (ref 94–109)
CHOLEST SERPL-MCNC: 156 MG/DL
CO2 SERPL-SCNC: 23 MMOL/L (ref 20–32)
CREAT SERPL-MCNC: 1 MG/DL (ref 0.52–1.04)
GFR SERPL CREATININE-BSD FRML MDRD: 58 ML/MIN/{1.73_M2}
GLUCOSE SERPL-MCNC: 103 MG/DL (ref 70–99)
HDLC SERPL-MCNC: 42 MG/DL
LDLC SERPL CALC-MCNC: 70 MG/DL
NONHDLC SERPL-MCNC: 114 MG/DL
POTASSIUM SERPL-SCNC: 4 MMOL/L (ref 3.4–5.3)
SODIUM SERPL-SCNC: 136 MMOL/L (ref 133–144)
TRIGL SERPL-MCNC: 219 MG/DL
TSH SERPL DL<=0.005 MIU/L-ACNC: 1.31 MU/L (ref 0.4–4)

## 2019-01-13 LAB
CREAT UR-MCNC: 25 MG/DL
MICROALBUMIN UR-MCNC: <5 MG/L
MICROALBUMIN/CREAT UR: NORMAL MG/G CR (ref 0–25)

## 2019-01-14 RX ORDER — METOPROLOL SUCCINATE 100 MG/1
TABLET, EXTENDED RELEASE ORAL
Qty: 90 TABLET | Refills: 1 | OUTPATIENT
Start: 2019-01-14

## 2019-05-27 NOTE — PROGRESS NOTES
DIAGNOSIS:  Clinical stage II infiltrating ductal carcinoma of the left breast. The patient had noticed a mass in her left breast and went in for evaluation. Mammogram on 09/13/2013 revealed a 2.5 x 2.0 x 0.8 cm left breast mass. Ultrasound-guided biopsy on 09/17/2013 revealed infiltrating ductal carcinoma, [place] grade 3. Tumor was weakly to moderately positive for estrogen receptors and weakly positive for progesterone receptors. HER-2 was amplified. MRI of the breasts revealed a 1.6 x 1.6 x 1.6 cm mass in her left breast and her tumor was too small for the I-SPY-2 clinical trial. She did begin neoadjuvant treatment with weekly Taxol and Herceptin on 10/18/2013 and completed 12 doses on 01/03/2013. She started Adriamycin/Cytoxan (AC) on 01/09/2014 and completed 4 cycles on 02/20/2014. She went on to have a left lumpectomy with left sentinel lymph node biopsy on 03/17/2014. There was no residual cancer. Zero out of 1 lymph nodes was positive. She restarted Herceptin on 04/02/2014 and completed her year on 12/09/2014. She completed left-sided breast radiation receiving 6000 cGy on 05/23/2014. She started letrozole in 03/2014.    INTERVAL HISTORY:  Ms. Uribe returns to the clinic today for follow-up for her breast carcinoma. She continues on the letrozole. She has no new concerns. No new pains or myalgias, myalgias, or any new side effects that she relates to letrozole. She continues to smoke. Walking for exercise. WBC is elevated. She states she had some nausea and diarrhea yesterday but thinks she ate something that didn't agree with her. No fevers. Feeling back to normal today with no nausea/diarrhea. Denies abdominal pain. Has occasional cough in AM but nothing unusual. Has some sinus congestion and pressure but does not want antibiotics at this time. She is taking calcium and vitamin D.    She denies chest pain, shortness of breath, blood in stool, black tarry stools, change in bowel habits, bone pains,  urinary concerns or hematuria, or new headaches (baseline has some headaches during allergy season when congested).  Denies leg swelling, rashes, dizziness.     MEDICATIONS:   Current Outpatient Medications   Medication Sig Dispense Refill     Acetaminophen (TYLENOL PO) Take by mouth as needed 500 mg as needed       ascorbic acid (VITAMIN C) 250 MG CHEW Take 500 mg by mouth daily        aspirin (ASA) 81 MG EC tablet Take 1 tablet (81 mg) by mouth daily 90 tablet 4     Calcium Carbonate (CALCIUM 600 PO) Take 1 tablet by mouth 2 times daily        Cholecalciferol (VITAMIN D) 1000 UNITS capsule Take 2 capsules by mouth daily        hydrochlorothiazide (MICROZIDE) 12.5 MG capsule TAKE 1 CAPSULE (12.5 MG) BY MOUTH DAILY. NEED BLOOD PRESSURE CHECK 90 capsule 1     letrozole (FEMARA) 2.5 MG tablet Take 1 tablet (2.5 mg) by mouth daily 90 tablet 3     losartan (COZAAR) 100 MG tablet Take 1 tablet (100 mg) by mouth daily 90 tablet 1     metFORMIN (GLUCOPHAGE) 500 MG tablet Take 1 tablet (500 mg) by mouth 2 times daily (with meals) 60 tablet 5     metoprolol succinate ER (TOPROL-XL) 100 MG 24 hr tablet Take 1 tablet (100 mg) by mouth daily 90 tablet 1     Multiple Vitamins-Minerals (DIASENSE MULTIVITAMIN PO) Take 1 tablet by mouth daily       simvastatin (ZOCOR) 20 MG tablet Take 1 tablet (20 mg) by mouth daily 90 tablet 2     simvastatin (ZOCOR) 20 MG tablet Take 1 tablet (20 mg) by mouth daily 90 tablet 1         ALLERGIES:    Allergies   Allergen Reactions     Dust Mites      And pollen       PHYSICAL EXAMINATION:  Vitals: LMP  (LMP Unknown)   GENERAL:  A pleasant person in no acute distress.   HEENT:  Sclerae are nonicteric.   NECK:  Supple.   LYMPH NODES:  No peripheral lymphadenopathy noted in the axillary, supraclavicular, or cervical regions.   LUNGS:  Clear to auscultation bilaterally.   HEART:  Regular rate and rhythm.   ABDOMEN:  Bowel sounds are active.  Soft and nontender.  No hepatosplenomegaly or other masses  appreciated.  LOWER EXTREMITIES:  Without pitting edema to the knees bilaterally.   NEUROLOGICAL:  Alert/orientated/able to answer all questions.  CN grossly intact.  BREAST: Right breast normal to inspection, no masses. Left breast, well healed surgical incision in the 10-12 o'clock position, no masses.     LAB:   Results for BAR URIBE (MRN [id number]) as of 5/28/2019 11:38   Ref. Range 5/28/2019 10:54   WBC Latest Ref Range: 4.0 - 11.0 10e9/L 12.9 (H)   Hemoglobin Latest Ref Range: 11.7 - 15.7 g/dL 12.2   Hematocrit Latest Ref Range: 35.0 - 47.0 % 36.7   Platelet Count Latest Ref Range: 150 - 450 10e9/L 271   RBC Count Latest Ref Range: 3.8 - 5.2 10e12/L 3.97   MCV Latest Ref Range: 78 - 100 fl 92   MCH Latest Ref Range: 26.5 - 33.0 pg 30.7   MCHC Latest Ref Range: 31.5 - 36.5 g/dL 33.2   RDW Latest Ref Range: 10.0 - 15.0 % 12.1   Diff Method Unknown Automated Method   % Neutrophils Latest Units: % 73.7   % Lymphocytes Latest Units: % 15.1   % Monocytes Latest Units: % 8.5   % Eosinophils Latest Units: % 1.5   % Basophils Latest Units: % 0.7   % Immature Granulocytes Latest Units: % 0.5   Nucleated RBCs Latest Ref Range: 0 /100 0   Absolute Neutrophil Latest Ref Range: 1.6 - 8.3 10e9/L 9.5 (H)   Absolute Lymphocytes Latest Ref Range: 0.8 - 5.3 10e9/L 1.9   Absolute Monocytes Latest Ref Range: 0.0 - 1.3 10e9/L 1.1   Absolute Eosinophils Latest Ref Range: 0.0 - 0.7 10e9/L 0.2   Absolute Basophils Latest Ref Range: 0.0 - 0.2 10e9/L 0.1   Abs Immature Granulocytes Latest Ref Range: 0 - 0.4 10e9/L 0.1   Absolute Nucleated RBC Unknown 0.0       IMPRESSION/PLAN:   1.  Clinical stage II infiltrating ductal carcinoma of the left breast, weakly/moderately ER positive, HER-2 positive, status-post neoadjuvant Taxol/Herceptin and ddAC with complete pathologic response to treatment at time of lumpectomy. She completed adjuvant radiation. Ms. Uribe continues to do well at this time. She is not having any signs or  symptoms that would suggest recurrence of her breast carcinoma. Plan to continue letrozole daily with plan to complete 10 years of therapy.   - Mammogram due in November 2019 -- follow-up with Dr. Esparza at that time    2.  Bone health. Bone density in 2014 was consistent with a T score of -2.4. She declined any intervention and does not want to repeat the bone density scans. She understands there is potential bone loss with letrozole.    3. History of smoking.  Ms. Uribe continues to smoke and does not want to quit. She smokes 1 pack/day since age 25. She has not been interested in screening CT, which she is eligible for with her smoking histoy.    4. Diabetes, HTN, hyperlipidemia. Follows with PCP, last seen in December.    5. Risk of cardiac toxicity from prior chemotherapy: She has not had a post-treatment echo which I recommended today. She declined.    6. Risk of Bladder toxicity.  Given her exposure to Cytoxan, she should report urinary urgency, urinary frequency, dysuria or hematuria. Due to risk of developing bladder cancer, encourage alcohol and smoking cessation    7. Cancer screening. She should undergo routine screening for women in her age group. She had a hysterectomy. She has not had a colonoscopy but declines.     8. Healthy lifestyle. She should maintain a healthy weight with a BMI between 20-25. She should exercise at least 150 minutes weekly at moderate intensity. She should see the eye doctor every 1-2 years, and dentist every 6 months for cleanings. She should not use any tobacco. She should minimize alcohol intake. If continuing to drink, should follow CDC recommendations for no more than 1 alcoholic drink/day for women. She should receive the annual influenza vaccine and Pneumococcal vaccine. She has declined.    9. Leukocytosis: WBC elevated at 12.9, ANC 9.5. She is afebrile. She had some nausea and diarrhea yesterday that she has recovered from and is feeling fine today. Able to eat/drink  without nausea and no diarrhea today. Only other localizing symptom is some sinus congestion/pressure due to allergies. She does have history of sinus infections in past, but declined antibiotics at this time. She will call to let us know if she develops fever, recurrence of diarrhea, worsening sinus symptoms, etc. Recommend she stay hydrated. Cr mildly elevated today.    Maria Teresa Cuevas PA-C

## 2019-05-28 ENCOUNTER — APPOINTMENT (OUTPATIENT)
Dept: LAB | Facility: CLINIC | Age: 69
End: 2019-05-28
Attending: INTERNAL MEDICINE
Payer: COMMERCIAL

## 2019-05-28 ENCOUNTER — ONCOLOGY VISIT (OUTPATIENT)
Dept: ONCOLOGY | Facility: CLINIC | Age: 69
End: 2019-05-28
Attending: PHYSICIAN ASSISTANT
Payer: COMMERCIAL

## 2019-05-28 VITALS
HEART RATE: 78 BPM | DIASTOLIC BLOOD PRESSURE: 67 MMHG | HEIGHT: 66 IN | BODY MASS INDEX: 17.95 KG/M2 | SYSTOLIC BLOOD PRESSURE: 156 MMHG | TEMPERATURE: 98.7 F | RESPIRATION RATE: 16 BRPM | OXYGEN SATURATION: 98 % | WEIGHT: 111.7 LBS

## 2019-05-28 DIAGNOSIS — Z17.0 CARCINOMA OF UPPER-INNER QUADRANT OF LEFT BREAST IN FEMALE, ESTROGEN RECEPTOR POSITIVE (H): ICD-10-CM

## 2019-05-28 DIAGNOSIS — C50.212 CARCINOMA OF UPPER-INNER QUADRANT OF LEFT BREAST IN FEMALE, ESTROGEN RECEPTOR POSITIVE (H): ICD-10-CM

## 2019-05-28 LAB
ALBUMIN SERPL-MCNC: 3.9 G/DL (ref 3.4–5)
ALP SERPL-CCNC: 76 U/L (ref 40–150)
ALT SERPL W P-5'-P-CCNC: 24 U/L (ref 0–50)
ANION GAP SERPL CALCULATED.3IONS-SCNC: 7 MMOL/L (ref 3–14)
AST SERPL W P-5'-P-CCNC: 19 U/L (ref 0–45)
BASOPHILS # BLD AUTO: 0.1 10E9/L (ref 0–0.2)
BASOPHILS NFR BLD AUTO: 0.7 %
BILIRUB SERPL-MCNC: 0.3 MG/DL (ref 0.2–1.3)
BUN SERPL-MCNC: 23 MG/DL (ref 7–30)
CALCIUM SERPL-MCNC: 10 MG/DL (ref 8.5–10.1)
CHLORIDE SERPL-SCNC: 102 MMOL/L (ref 94–109)
CO2 SERPL-SCNC: 27 MMOL/L (ref 20–32)
CREAT SERPL-MCNC: 1.06 MG/DL (ref 0.52–1.04)
DIFFERENTIAL METHOD BLD: ABNORMAL
EOSINOPHIL # BLD AUTO: 0.2 10E9/L (ref 0–0.7)
EOSINOPHIL NFR BLD AUTO: 1.5 %
ERYTHROCYTE [DISTWIDTH] IN BLOOD BY AUTOMATED COUNT: 12.1 % (ref 10–15)
GFR SERPL CREATININE-BSD FRML MDRD: 54 ML/MIN/{1.73_M2}
GLUCOSE SERPL-MCNC: 118 MG/DL (ref 70–99)
HCT VFR BLD AUTO: 36.7 % (ref 35–47)
HGB BLD-MCNC: 12.2 G/DL (ref 11.7–15.7)
IMM GRANULOCYTES # BLD: 0.1 10E9/L (ref 0–0.4)
IMM GRANULOCYTES NFR BLD: 0.5 %
LYMPHOCYTES # BLD AUTO: 1.9 10E9/L (ref 0.8–5.3)
LYMPHOCYTES NFR BLD AUTO: 15.1 %
MCH RBC QN AUTO: 30.7 PG (ref 26.5–33)
MCHC RBC AUTO-ENTMCNC: 33.2 G/DL (ref 31.5–36.5)
MCV RBC AUTO: 92 FL (ref 78–100)
MONOCYTES # BLD AUTO: 1.1 10E9/L (ref 0–1.3)
MONOCYTES NFR BLD AUTO: 8.5 %
NEUTROPHILS # BLD AUTO: 9.5 10E9/L (ref 1.6–8.3)
NEUTROPHILS NFR BLD AUTO: 73.7 %
NRBC # BLD AUTO: 0 10*3/UL
NRBC BLD AUTO-RTO: 0 /100
PLATELET # BLD AUTO: 271 10E9/L (ref 150–450)
POTASSIUM SERPL-SCNC: 4.2 MMOL/L (ref 3.4–5.3)
PROT SERPL-MCNC: 7.2 G/DL (ref 6.8–8.8)
RBC # BLD AUTO: 3.97 10E12/L (ref 3.8–5.2)
SODIUM SERPL-SCNC: 137 MMOL/L (ref 133–144)
WBC # BLD AUTO: 12.9 10E9/L (ref 4–11)

## 2019-05-28 PROCEDURE — G0463 HOSPITAL OUTPT CLINIC VISIT: HCPCS | Mod: ZF

## 2019-05-28 PROCEDURE — 99214 OFFICE O/P EST MOD 30 MIN: CPT | Mod: ZP | Performed by: PHYSICIAN ASSISTANT

## 2019-05-28 PROCEDURE — 85025 COMPLETE CBC W/AUTO DIFF WBC: CPT | Performed by: INTERNAL MEDICINE

## 2019-05-28 PROCEDURE — 36415 COLL VENOUS BLD VENIPUNCTURE: CPT

## 2019-05-28 PROCEDURE — 80053 COMPREHEN METABOLIC PANEL: CPT | Performed by: INTERNAL MEDICINE

## 2019-05-28 ASSESSMENT — PAIN SCALES - GENERAL: PAINLEVEL: NO PAIN (0)

## 2019-05-28 ASSESSMENT — MIFFLIN-ST. JEOR: SCORE: 1045.67

## 2019-05-28 NOTE — LETTER
5/28/2019      RE: Radha Uribe  [address]  [facility] [address]       DIAGNOSIS:  Clinical stage II infiltrating ductal carcinoma of the left breast. The patient had noticed a mass in her left breast and went in for evaluation. Mammogram on 09/13/2013 revealed a 2.5 x 2.0 x 0.8 cm left breast mass. Ultrasound-guided biopsy on 09/17/2013 revealed infiltrating ductal carcinoma, Daylin grade 3. Tumor was weakly to moderately positive for estrogen receptors and weakly positive for progesterone receptors. HER-2 was amplified. MRI of the breasts revealed a 1.6 x 1.6 x 1.6 cm mass in her left breast and her tumor was too small for the I-SPY-2 clinical trial. She did begin neoadjuvant treatment with weekly Taxol and Herceptin on 10/18/2013 and completed 12 doses on 01/03/2013. She started Adriamycin/Cytoxan (AC) on 01/09/2014 and completed 4 cycles on 02/20/2014. She went on to have a left lumpectomy with left sentinel lymph node biopsy on 03/17/2014. There was no residual cancer. Zero out of 1 lymph nodes was positive. She restarted Herceptin on 04/02/2014 and completed her year on 12/09/2014. She completed left-sided breast radiation receiving 6000 cGy on 05/23/2014. She started letrozole in 03/2014.    INTERVAL HISTORY:  Ms. Uribe returns to the clinic today for follow-up for her breast carcinoma. She continues on the letrozole. She has no new concerns. No new pains or myalgias, myalgias, or any new side effects that she relates to letrozole. She continues to smoke. Walking for exercise. WBC is elevated. She states she had some nausea and diarrhea yesterday but thinks she ate something that didn't agree with her. No fevers. Feeling back to normal today with no nausea/diarrhea. Denies abdominal pain. Has occasional cough in AM but nothing unusual. Has some sinus congestion and pressure but does not want antibiotics at this time. She is taking calcium and vitamin D.    She denies chest pain, shortness  of breath, blood in stool, black tarry stools, change in bowel habits, bone pains, urinary concerns or hematuria, or new headaches (baseline has some headaches during allergy season when congested).  Denies leg swelling, rashes, dizziness.     MEDICATIONS:   Current Outpatient Medications   Medication Sig Dispense Refill     Acetaminophen (TYLENOL PO) Take by mouth as needed 500 mg as needed       ascorbic acid (VITAMIN C) 250 MG CHEW Take 500 mg by mouth daily        aspirin (ASA) 81 MG EC tablet Take 1 tablet (81 mg) by mouth daily 90 tablet 4     Calcium Carbonate (CALCIUM 600 PO) Take 1 tablet by mouth 2 times daily        Cholecalciferol (VITAMIN D) 1000 UNITS capsule Take 2 capsules by mouth daily        hydrochlorothiazide (MICROZIDE) 12.5 MG capsule TAKE 1 CAPSULE (12.5 MG) BY MOUTH DAILY. NEED BLOOD PRESSURE CHECK 90 capsule 1     letrozole (FEMARA) 2.5 MG tablet Take 1 tablet (2.5 mg) by mouth daily 90 tablet 3     losartan (COZAAR) 100 MG tablet Take 1 tablet (100 mg) by mouth daily 90 tablet 1     metFORMIN (GLUCOPHAGE) 500 MG tablet Take 1 tablet (500 mg) by mouth 2 times daily (with meals) 60 tablet 5     metoprolol succinate ER (TOPROL-XL) 100 MG 24 hr tablet Take 1 tablet (100 mg) by mouth daily 90 tablet 1     Multiple Vitamins-Minerals (DIASENSE MULTIVITAMIN PO) Take 1 tablet by mouth daily       simvastatin (ZOCOR) 20 MG tablet Take 1 tablet (20 mg) by mouth daily 90 tablet 2     simvastatin (ZOCOR) 20 MG tablet Take 1 tablet (20 mg) by mouth daily 90 tablet 1         ALLERGIES:    Allergies   Allergen Reactions     Dust Mites      And pollen       PHYSICAL EXAMINATION:  Vitals: LMP  (LMP Unknown)   GENERAL:  A pleasant person in no acute distress.   HEENT:  Sclerae are nonicteric.   NECK:  Supple.   LYMPH NODES:  No peripheral lymphadenopathy noted in the axillary, supraclavicular, or cervical regions.   LUNGS:  Clear to auscultation bilaterally.   HEART:  Regular rate and rhythm.   ABDOMEN:   Bowel sounds are active.  Soft and nontender.  No hepatosplenomegaly or other masses appreciated.  LOWER EXTREMITIES:  Without pitting edema to the knees bilaterally.   NEUROLOGICAL:  Alert/orientated/able to answer all questions.  CN grossly intact.  BREAST: Right breast normal to inspection, no masses. Left breast, well healed surgical incision in the 10-12 o'clock position, no masses.     LAB:   Results for ABR VALENCIA (MRN [id number]) as of 5/28/2019 11:38   Ref. Range 5/28/2019 10:54   WBC Latest Ref Range: 4.0 - 11.0 10e9/L 12.9 (H)   Hemoglobin Latest Ref Range: 11.7 - 15.7 g/dL 12.2   Hematocrit Latest Ref Range: 35.0 - 47.0 % 36.7   Platelet Count Latest Ref Range: 150 - 450 10e9/L 271   RBC Count Latest Ref Range: 3.8 - 5.2 10e12/L 3.97   MCV Latest Ref Range: 78 - 100 fl 92   MCH Latest Ref Range: 26.5 - 33.0 pg 30.7   MCHC Latest Ref Range: 31.5 - 36.5 g/dL 33.2   RDW Latest Ref Range: 10.0 - 15.0 % 12.1   Diff Method Unknown Automated Method   % Neutrophils Latest Units: % 73.7   % Lymphocytes Latest Units: % 15.1   % Monocytes Latest Units: % 8.5   % Eosinophils Latest Units: % 1.5   % Basophils Latest Units: % 0.7   % Immature Granulocytes Latest Units: % 0.5   Nucleated RBCs Latest Ref Range: 0 /100 0   Absolute Neutrophil Latest Ref Range: 1.6 - 8.3 10e9/L 9.5 (H)   Absolute Lymphocytes Latest Ref Range: 0.8 - 5.3 10e9/L 1.9   Absolute Monocytes Latest Ref Range: 0.0 - 1.3 10e9/L 1.1   Absolute Eosinophils Latest Ref Range: 0.0 - 0.7 10e9/L 0.2   Absolute Basophils Latest Ref Range: 0.0 - 0.2 10e9/L 0.1   Abs Immature Granulocytes Latest Ref Range: 0 - 0.4 10e9/L 0.1   Absolute Nucleated RBC Unknown 0.0       IMPRESSION/PLAN:   1.  Clinical stage II infiltrating ductal carcinoma of the left breast, weakly/moderately ER positive, HER-2 positive, status-post neoadjuvant Taxol/Herceptin and ddAC with complete pathologic response to treatment at time of lumpectomy. She completed adjuvant  radiation. Ms. Uribe continues to do well at this time. She is not having any signs or symptoms that would suggest recurrence of her breast carcinoma. Plan to continue letrozole daily with plan to complete 10 years of therapy.   - Mammogram due in November 2019 -- follow-up with Dr. Esparza at that time    2.  Bone health. Bone density in 2014 was consistent with a T score of -2.4. She declined any intervention and does not want to repeat the bone density scans. She understands there is potential bone loss with letrozole.    3. History of smoking.  Ms. Uribe continues to smoke and does not want to quit. She smokes 1 pack/day since age 25. She has not been interested in screening CT, which she is eligible for with her smoking histoy.    4. Diabetes, HTN, hyperlipidemia. Follows with PCP, last seen in December.    5. Risk of cardiac toxicity from prior chemotherapy: She has not had a post-treatment echo which I recommended today. She declined.    6. Risk of Bladder toxicity.  Given her exposure to Cytoxan, she should report urinary urgency, urinary frequency, dysuria or hematuria. Due to risk of developing bladder cancer, encourage alcohol and smoking cessation    7. Cancer screening. She should undergo routine screening for women in her age group. She had a hysterectomy. She has not had a colonoscopy but declines.     8. Healthy lifestyle. She should maintain a healthy weight with a BMI between 20-25. She should exercise at least 150 minutes weekly at moderate intensity. She should see the eye doctor every 1-2 years, and dentist every 6 months for cleanings. She should not use any tobacco. She should minimize alcohol intake. If continuing to drink, should follow CDC recommendations for no more than 1 alcoholic drink/day for women. She should receive the annual influenza vaccine and Pneumococcal vaccine. She has declined.    9. Leukocytosis: WBC elevated at 12.9, ANC 9.5. She is afebrile. She had some nausea and  diarrhea yesterday that she has recovered from and is feeling fine today. Able to eat/drink without nausea and no diarrhea today. Only other localizing symptom is some sinus congestion/pressure due to allergies. She does have history of sinus infections in past, but declined antibiotics at this time. She will call to let us know if she develops fever, recurrence of diarrhea, worsening sinus symptoms, etc. Recommend she stay hydrated. Cr mildly elevated today.    AGUILA Cano PA

## 2019-05-28 NOTE — NURSING NOTE
Chief Complaint   Patient presents with     Blood Draw     Labs drawn via  by RN in lab. VS taken.      Labs drawn via venipuncture. Vital signs taken. Checked into next appointment.     Yue Cervantes RN

## 2019-05-28 NOTE — NURSING NOTE
"Oncology Rooming Note    May 28, 2019 11:09 AM   Radha Uribe is a 69 year old female who presents for:    Chief Complaint   Patient presents with     Blood Draw     Labs drawn via  by RN in lab. VS taken.      Oncology Clinic Visit     Return visit related to Breast Cancer     Initial Vitals: /67 (BP Location: Right arm, Patient Position: Sitting, Cuff Size: Adult Regular)   Pulse 78   Temp 98.7  F (37.1  C) (Oral)   Resp 16   Ht 1.664 m (5' 5.51\")   Wt 50.7 kg (111 lb 11.2 oz)   LMP  (LMP Unknown)   SpO2 98%   BMI 18.30 kg/m   Estimated body mass index is 18.3 kg/m  as calculated from the following:    Height as of this encounter: 1.664 m (5' 5.51\").    Weight as of this encounter: 50.7 kg (111 lb 11.2 oz). Body surface area is 1.53 meters squared.  No Pain (0) Comment: Data Unavailable   No LMP recorded (lmp unknown). Patient has had a hysterectomy.  Allergies reviewed: Yes  Medications reviewed: Yes    Medications: Medication refills not needed today.  Pharmacy name entered into [facility]: [id number] [place], [place] - [address] [place] [address]    Clinical concerns: No new concerns. Provider was notified.      Jeanne Abel LPN            "

## 2019-07-01 DIAGNOSIS — C50.212 BREAST CANCER OF UPPER-INNER QUADRANT OF LEFT FEMALE BREAST (H): ICD-10-CM

## 2019-07-01 RX ORDER — LETROZOLE 2.5 MG/1
2.5 TABLET, FILM COATED ORAL DAILY
Qty: 90 TABLET | Refills: 3 | Status: SHIPPED | OUTPATIENT
Start: 2019-07-01 | End: 2020-07-15

## 2019-07-12 ENCOUNTER — TELEPHONE (OUTPATIENT)
Dept: ONCOLOGY | Facility: CLINIC | Age: 69
End: 2019-07-12

## 2019-07-18 NOTE — TELEPHONE ENCOUNTER
Tobacco Treatment Program at the [facility] attempted to reach Ms. Uribe on 7/18/2019 regarding the tobacco cessation program to help Ms. Uribe to quit smoking. We will attempt to reach Ms. Uribe another time.

## 2019-08-08 ENCOUNTER — TELEPHONE (OUTPATIENT)
Dept: FAMILY MEDICINE | Facility: CLINIC | Age: 69
End: 2019-08-08

## 2019-08-08 NOTE — LETTER
[facility]  [address]  [facility] [address] [phone]    August 8, 2019    Radha Urieb  [address]  [facility] [address]      Dear Ms. Uribe,    During a recent visit to our clinic, your blood pressure was elevated above the target range for your health.  Because untreated high blood pressure could have a negative effect on your long term health, we hope you have had an opportunity to collect more blood pressure measurements outside of clinic.    If you have not already done so, please take this opportunity to collect a few blood pressure readings at home, at your pharmacy or through your primary care clinic, record these below in the space provided.      Once you have collected these numbers, schedule a clinic visit with us to review these numbers and determine if further treatment is necessary.  You can find information about [facility] clinic locations and providers at [email or web address].      Other things you are due for:   Health Maintenance Due   Topic     ZOSTER IMMUNIZATION (1 of 2)     MEDICARE ANNUAL WELLNESS VISIT      DIABETIC FOOT EXAM      EYE EXAM (have copy faxed to clinic please!)     A1C        In good health,    Kodak Preston MD and Princess CHRISTIANO Malloy CMA                          Date Blood Pressure Heart Rate                                                                           Why is high blood pressure a big deal?      If blood pressure is elevated above target levels you have an increased risk of experiencing a heart attack or stroke, developing heart failure, kidney disease or other chronic diseases.    With appropriate early recognition and treatment, these health problems can be avoided in most cases.  Your physician can help you determine the need for treatment and discuss the non-medication and medication routes to treating high blood pressure as well as evaluate you for possible causes  and effects of high blood pressure.    The target range for ideal blood pressure control is less than 140/90 for most individuals.  For those who have been diagnosed with heart disease, diabetes, stroke or peripheral vascular disease this target range is less than 130/80.

## 2019-08-08 NOTE — TELEPHONE ENCOUNTER
Panel Management Review      Patient has the following on her problem list:     Diabetes    ASA: Passed    Last A1C  Lab Results   Component Value Date    A1C 6.7 01/09/2019    A1C 6.3 06/19/2018    A1C 6.1 12/06/2017    A1C 6.2 05/10/2017    A1C 6.5 05/18/2016     A1C tested: MONITOR    Last LDL:    Lab Results   Component Value Date    CHOL 156 01/09/2019     Lab Results   Component Value Date    HDL 42 01/09/2019     Lab Results   Component Value Date    LDL 70 01/09/2019     Lab Results   Component Value Date    TRIG 219 01/09/2019     No results found for: TAYLOR  Lab Results   Component Value Date    NHDL 114 01/09/2019       Is the patient on a Statin? YES             Is the patient on Aspirin? YES    Medications     HMG CoA Reductase Inhibitors     simvastatin (ZOCOR) 20 MG tablet        simvastatin (ZOCOR) 20 MG tablet       Salicylates     aspirin (ASA) 81 MG EC tablet             Last three blood pressure readings:  BP Readings from Last 3 Encounters:   05/28/19 156/67   01/09/19 138/84   11/27/18 143/78       Date of last diabetes office visit: 01/09/2019     Tobacco History:     History   Smoking Status     Current Some Day Smoker     Packs/day: 0.30   Smokeless Tobacco     Never Used           IVD   ASA: Passed    Last LDL:    Lab Results   Component Value Date    CHOL 156 01/09/2019     Lab Results   Component Value Date    HDL 42 01/09/2019     Lab Results   Component Value Date    LDL 70 01/09/2019     Lab Results   Component Value Date    TRIG 219 01/09/2019      No results found for: PRERNASONALSIERRA     Is the patient on a Statin? YES   Is the patient on Aspirin? YES                  Medications     HMG CoA Reductase Inhibitors     simvastatin (ZOCOR) 20 MG tablet        simvastatin (ZOCOR) 20 MG tablet       Salicylates     aspirin (ASA) 81 MG EC tablet             Last three blood pressure readings:  BP Readings from Last 3 Encounters:   05/28/19 156/67   01/09/19 138/84   11/27/18 143/78         Tobacco History:     History   Smoking Status     Current Some Day Smoker     Packs/day: 0.30   Smokeless Tobacco     Never Used       Hypertension   Last three blood pressure readings:  BP Readings from Last 3 Encounters:   05/28/19 156/67   01/09/19 138/84   11/27/18 143/78     Blood pressure: FAILED    HTN Guidelines:  Less than 140/90      Composite cancer screening  Chart review shows that this patient is due/due soon for the following None  Summary:    Patient is due/failing the following:   BP CHECK    Action needed:   Patient needs nurse only appointment.    Type of outreach:    Sent letter.    Questions for provider review:    None                                                                                                                                    Princess CHRISTIANO Malloy CMA       Chart routed to none needed.

## 2019-08-08 NOTE — TELEPHONE ENCOUNTER
Tobacco Treatment Program at the [facility] attempted to reach Ms. Uribe on 8/8/2019 regarding the tobacco cessation program to help Ms. Uribe to quit smoking. We will attempt to reach Ms. Uribe another time.     Lupe Schmitt [facility]  Tobacco Treatment Specialist  PH: [phone]

## 2019-08-15 DIAGNOSIS — I10 ESSENTIAL HYPERTENSION, BENIGN: ICD-10-CM

## 2019-08-15 DIAGNOSIS — I10 HYPERTENSION GOAL BP (BLOOD PRESSURE) < 140/90: ICD-10-CM

## 2019-08-15 RX ORDER — HYDROCHLOROTHIAZIDE 12.5 MG/1
CAPSULE ORAL
Qty: 30 CAPSULE | Refills: 5 | Status: SHIPPED | OUTPATIENT
Start: 2019-08-15 | End: 2020-03-03

## 2019-08-15 RX ORDER — METOPROLOL SUCCINATE 100 MG/1
TABLET, EXTENDED RELEASE ORAL
Qty: 30 TABLET | Refills: 5 | Status: SHIPPED | OUTPATIENT
Start: 2019-08-15 | End: 2020-05-11

## 2019-08-15 NOTE — TELEPHONE ENCOUNTER
Routing refill request to provider for review/approval because:  BP and labs out of range:  CR

## 2019-08-15 NOTE — TELEPHONE ENCOUNTER
"Requested Prescriptions   Pending Prescriptions Disp Refills     hydrochlorothiazide (MICROZIDE) 12.5 MG capsule [Pharmacy Med Name: HYDROCHLOROTHIAZIDE 12.5 MG CP]  Last Written Prescription Date:  1/9/2019  Last Fill Quantity: 90 capsule,  # refills: 1   Last office visit: 1/9/2019 with prescribing provider:  Mohan   Future Office Visit:     30 capsule 5     Sig: TAKE 1 CAPSULE BY MOUTH DAILY. NEED BLOOD PRESSURE CHECK       Diuretics (Including Combos) Protocol Failed - 8/15/2019  1:15 AM        Failed - Blood pressure under 140/90 in past 12 months     BP Readings from Last 3 Encounters:   05/28/19 156/67   01/09/19 138/84   11/27/18 143/78                 Failed - Normal serum creatinine on file in past 12 months     Recent Labs   Lab Test 05/28/19  1054   CR 1.06*              Passed - Recent (12 mo) or future (30 days) visit within the authorizing provider's specialty     Patient had office visit in the last 12 months or has a visit in the next 30 days with authorizing provider or within the authorizing provider's specialty.  See \"Patient Info\" tab in inbasket, or \"Choose Columns\" in Meds & Orders section of the refill encounter.              Passed - Medication is active on med list        Passed - Patient is age 18 or older        Passed - No active pregancy on record        Passed - Normal serum potassium on file in past 12 months     Recent Labs   Lab Test 05/28/19  1054   POTASSIUM 4.2                    Passed - Normal serum sodium on file in past 12 months     Recent Labs   Lab Test 05/28/19  1054                 Passed - No positive pregnancy test in past 12 months        metoprolol succinate ER (TOPROL-XL) 100 MG 24 hr tablet [Pharmacy Med Name: METOPROLOL SUCC  MG TAB]  Last Written Prescription Date:  1/9/2019  Last Fill Quantity: 90 tablet,  # refills: 1   Last office visit: 1/9/2019 with prescribing provider:  Mohan   Future Office Visit:     30 tablet 5     Sig: TAKE 1 TABLET BY " "MOUTH EVERY DAY       Beta-Blockers Protocol Failed - 8/15/2019  1:15 AM        Failed - Blood pressure under 140/90 in past 12 months     BP Readings from Last 3 Encounters:   05/28/19 156/67   01/09/19 138/84   11/27/18 143/78                 Passed - Patient is age 6 or older        Passed - Recent (12 mo) or future (30 days) visit within the authorizing provider's specialty     Patient had office visit in the last 12 months or has a visit in the next 30 days with authorizing provider or within the authorizing provider's specialty.  See \"Patient Info\" tab in inbasket, or \"Choose Columns\" in Meds & Orders section of the refill encounter.              Passed - Medication is active on med list           "

## 2019-09-28 ENCOUNTER — HEALTH MAINTENANCE LETTER (OUTPATIENT)
Age: 69
End: 2019-09-28

## 2019-10-09 ENCOUNTER — OFFICE VISIT (OUTPATIENT)
Dept: FAMILY MEDICINE | Facility: CLINIC | Age: 69
End: 2019-10-09
Payer: COMMERCIAL

## 2019-10-09 VITALS
OXYGEN SATURATION: 99 % | SYSTOLIC BLOOD PRESSURE: 140 MMHG | WEIGHT: 120.5 LBS | TEMPERATURE: 97.8 F | DIASTOLIC BLOOD PRESSURE: 78 MMHG | BODY MASS INDEX: 19.74 KG/M2 | HEART RATE: 79 BPM

## 2019-10-09 DIAGNOSIS — E78.00 HYPERCHOLESTEROLEMIA: ICD-10-CM

## 2019-10-09 DIAGNOSIS — I10 ESSENTIAL HYPERTENSION, BENIGN: Chronic | ICD-10-CM

## 2019-10-09 DIAGNOSIS — E11.9 CONTROLLED TYPE 2 DIABETES MELLITUS WITHOUT COMPLICATION, WITHOUT LONG-TERM CURRENT USE OF INSULIN (H): Primary | Chronic | ICD-10-CM

## 2019-10-09 DIAGNOSIS — F17.200 TOBACCO DEPENDENCE SYNDROME: ICD-10-CM

## 2019-10-09 LAB — HBA1C MFR BLD: 6.5 % (ref 0–5.6)

## 2019-10-09 PROCEDURE — 83036 HEMOGLOBIN GLYCOSYLATED A1C: CPT | Performed by: FAMILY MEDICINE

## 2019-10-09 PROCEDURE — 84450 TRANSFERASE (AST) (SGOT): CPT | Performed by: FAMILY MEDICINE

## 2019-10-09 PROCEDURE — 99214 OFFICE O/P EST MOD 30 MIN: CPT | Performed by: FAMILY MEDICINE

## 2019-10-09 PROCEDURE — 84443 ASSAY THYROID STIM HORMONE: CPT | Performed by: FAMILY MEDICINE

## 2019-10-09 PROCEDURE — 82043 UR ALBUMIN QUANTITATIVE: CPT | Performed by: FAMILY MEDICINE

## 2019-10-09 PROCEDURE — 84460 ALANINE AMINO (ALT) (SGPT): CPT | Performed by: FAMILY MEDICINE

## 2019-10-09 PROCEDURE — 80061 LIPID PANEL: CPT | Performed by: FAMILY MEDICINE

## 2019-10-09 PROCEDURE — 84439 ASSAY OF FREE THYROXINE: CPT | Performed by: FAMILY MEDICINE

## 2019-10-09 PROCEDURE — 80048 BASIC METABOLIC PNL TOTAL CA: CPT | Performed by: FAMILY MEDICINE

## 2019-10-09 PROCEDURE — 36415 COLL VENOUS BLD VENIPUNCTURE: CPT | Performed by: FAMILY MEDICINE

## 2019-10-09 RX ORDER — POLYETHYLENE GLYCOL 3350 17 G
2 POWDER IN PACKET (EA) ORAL
Qty: 90 LOZENGE | Refills: 1 | Status: SHIPPED | OUTPATIENT
Start: 2019-10-09 | End: 2020-07-15

## 2019-10-09 NOTE — PROGRESS NOTES
Subjective     Radha Uribe is a 69 year old female who presents to clinic today for the following health issues:    HPI   Diabetes Follow-up      How often are you checking your blood sugar? Not at all    What time of day are you checking your blood sugars (select all that apply)?  not    Have you had any blood sugars above 200?  No    Have you had any blood sugars below 70?  No    What symptoms do you notice when your blood sugar is low?  Dizzy    What concerns do you have today about your diabetes? None     Do you have any of these symptoms? (Select all that apply)  No numbness or tingling in feet.  No redness, sores or blisters on feet.  No complaints of excessive thirst.  No reports of blurry vision.  No significant changes to weight.     Have you had a diabetic eye exam in the last 12 months? No     Pt has been snacking more, eating more sugary foods intermittently over past 3 mo     BP Readings from Last 2 Encounters:   10/09/19 (!) 140/78   05/28/19 156/67     Hemoglobin A1C (%)   Date Value   01/09/2019 6.7 (H)   06/19/2018 6.3 (H)     LDL Cholesterol Calculated (mg/dL)   Date Value   01/09/2019 70   06/19/2018 73       Diabetes Management Resources      How many servings of fruits and vegetables do you eat daily?  0-1    On average, how many sweetened beverages do you drink each day (soda, juice, sweet tea, etc)?   2    How many days per week do you miss taking your medication? 0        Hyperlipidemia Follow-Up      Are you having any of the following symptoms? (Select all that apply)  No complaints of shortness of breath, chest pain or pressure.  No increased sweating or nausea with activity.  No left-sided neck or arm pain.  No complaints of pain in calves when walking 1-2 blocks.    Are you regularly taking any medication or supplement to lower your cholesterol?   Yes- Simvastatin     Are you having muscle aches or other side effects that you think could be caused by your cholesterol lowering  medication?  No      Hypertension Follow-up      Do you check your blood pressure regularly outside of the clinic? No     Are you following a low salt diet? Yes    Are your blood pressures ever more than 140 on the top number (systolic) OR more   than 90 on the bottom number (diastolic), for example 140/90? No     Pt has cut down on smoking.  She has had only 1/2 pack in past 7 weeks total  She smokes only when she gets more anxious     Recent Labs   Lab Test 05/28/19  1054 01/09/19  1544 06/19/18  1440 06/19/18  1439  12/06/17  0743  05/10/17  1544  01/19/12  1327   A1C  --  6.7*  --  6.3*  --  6.1*  --  6.2*   < > 5.7   LDL  --  70 73  --   --   --   --  106*   < > 125.4*   HDL  --  42* 42*  --   --   --   --   --   --  48   TRIG  --  219* 228*  --   --   --   --   --   --  68   ALT 24 22 22  --    < >  --    < > 54*   < > 36.0   CR 1.06* 1.00  --  0.97   < >  --    < > 0.76   < > 0.6   GFRESTIMATED 54* 58*  --  57*   < >  --    < > 76   < >  --    GFRESTBLACK 62 67  --  69   < >  --    < > >90   GFR Calc     < >  --    POTASSIUM 4.2 4.0  --  3.7   < >  --    < > 4.2   < > 4.4   TSH  --  1.31  --  1.23  --   --   --  1.16   < >  --     < > = values in this interval not displayed.      BP Readings from Last 3 Encounters:   10/09/19 (!) 140/78   05/28/19 156/67   01/09/19 138/84    Wt Readings from Last 3 Encounters:   10/09/19 54.7 kg (120 lb 8 oz)   05/28/19 50.7 kg (111 lb 11.2 oz)   01/09/19 56.5 kg (124 lb 8 oz)                    Reviewed and updated as needed this visit by Provider         Review of Systems   ROS COMP: CONSTITUTIONAL: NEGATIVE for fever, chills, change in weight  ENT/MOUTH: NEGATIVE for ear, mouth and throat problems  RESP: NEGATIVE for significant cough or SOB  CV: NEGATIVE for chest pain, palpitations or peripheral edema  ENDOCRINE: NEGATIVE for temperature intolerance, skin/hair changes and POSITIVE  for HX diabetes      Objective    BP (!) 140/78 (BP Location: Right arm,  Patient Position: Sitting, Cuff Size: Adult Regular)   Pulse 79   Temp 97.8  F (36.6  C) (Tympanic)   Wt 54.7 kg (120 lb 8 oz)   LMP  (LMP Unknown)   SpO2 99%   BMI 19.74 kg/m    Body mass index is 19.74 kg/m .  Physical Exam   GENERAL: healthy, alert and no distress  NECK: no adenopathy, no asymmetry, masses, or scars and thyroid normal to palpation  RESP: lungs clear to auscultation - no rales, rhonchi or wheezes  CV: regular rate and rhythm, normal S1 S2, no S3 or S4, no murmur, click or rub, no peripheral edema and peripheral pulses strong  ABDOMEN: soft, nontender, no hepatosplenomegaly, no masses and bowel sounds normal  MS: no gross musculoskeletal defects noted, no edema    Diagnostic Test Results:  Labs reviewed in Epic  Lab: see below, results pending        Assessment & Plan     Radha was seen today for diabetes.    Diagnoses and all orders for this visit:    Controlled type 2 diabetes mellitus without complication, without long-term current use of insulin (H)  -     Hemoglobin A1c  -     Basic metabolic panel  -     TSH with free T4 reflex  -     Albumin Random Urine Quantitative with Creat Ratio    Hypercholesterolemia  -     Lipid panel reflex to direct LDL Non-fasting  -     ALT  -     AST    Essential hypertension, benign    Tobacco dependence syndrome  -     nicotine (COMMIT) 2 MG lozenge; Place 1 lozenge (2 mg) inside cheek every hour as needed for smoking cessation         Tobacco Cessation:   reports that she has been smoking. She has been smoking about 0.30 packs per day. She has never used smokeless tobacco.  Tobacco Cessation Action Plan: Pharmacotherapies : other Nicotine replacement        FUTURE APPOINTMENTS:       - Follow-up visit in 6 mo   Patient Instructions   Keep working on quitting smoking.  Try using Nicotine gum instead of smoking      Return in about 6 months (around 4/9/2020) for Diabetes, Hypertension, hypercholesterol.    Kodak Preston MD  [facility]  [facility]

## 2019-10-10 LAB
ALT SERPL W P-5'-P-CCNC: 24 U/L (ref 0–50)
ANION GAP SERPL CALCULATED.3IONS-SCNC: 7 MMOL/L (ref 3–14)
AST SERPL W P-5'-P-CCNC: 17 U/L (ref 0–45)
BUN SERPL-MCNC: 25 MG/DL (ref 7–30)
CALCIUM SERPL-MCNC: 9.2 MG/DL (ref 8.5–10.1)
CHLORIDE SERPL-SCNC: 101 MMOL/L (ref 94–109)
CHOLEST SERPL-MCNC: 163 MG/DL
CO2 SERPL-SCNC: 26 MMOL/L (ref 20–32)
CREAT SERPL-MCNC: 0.77 MG/DL (ref 0.52–1.04)
CREAT UR-MCNC: 11 MG/DL
GFR SERPL CREATININE-BSD FRML MDRD: 78 ML/MIN/{1.73_M2}
GLUCOSE SERPL-MCNC: 150 MG/DL (ref 70–99)
HDLC SERPL-MCNC: 36 MG/DL
LDLC SERPL CALC-MCNC: 91 MG/DL
MICROALBUMIN UR-MCNC: <5 MG/L
MICROALBUMIN/CREAT UR: NORMAL MG/G CR (ref 0–25)
NONHDLC SERPL-MCNC: 127 MG/DL
POTASSIUM SERPL-SCNC: 4.2 MMOL/L (ref 3.4–5.3)
SODIUM SERPL-SCNC: 134 MMOL/L (ref 133–144)
T4 FREE SERPL-MCNC: 1.02 NG/DL (ref 0.76–1.46)
TRIGL SERPL-MCNC: 181 MG/DL
TSH SERPL DL<=0.005 MIU/L-ACNC: 0.26 MU/L (ref 0.4–4)

## 2019-11-08 ENCOUNTER — TELEPHONE (OUTPATIENT)
Dept: FAMILY MEDICINE | Facility: CLINIC | Age: 69
End: 2019-11-08

## 2019-11-08 NOTE — TELEPHONE ENCOUNTER
Panel Management Review      Patient has the following on her problem list:     Diabetes    ASA: Monitor    Last A1C  Lab Results   Component Value Date    A1C 6.5 10/09/2019    A1C 6.7 01/09/2019    A1C 6.3 06/19/2018    A1C 6.1 12/06/2017    A1C 6.2 05/10/2017     A1C tested: MONITOR    Last LDL:    Lab Results   Component Value Date    CHOL 163 10/09/2019     Lab Results   Component Value Date    HDL 36 10/09/2019     Lab Results   Component Value Date    LDL 91 10/09/2019     Lab Results   Component Value Date    TRIG 181 10/09/2019     No results found for: BENJAMINMARGARETHSIERRA  Lab Results   Component Value Date    NHDL 127 10/09/2019       Is the patient on a Statin? YES             Is the patient on Aspirin? YES    Medications     HMG CoA Reductase Inhibitors     simvastatin (ZOCOR) 20 MG tablet       Salicylates     aspirin (ASA) 81 MG EC tablet             Last three blood pressure readings:  BP Readings from Last 3 Encounters:   10/09/19 (!) 140/78   05/28/19 156/67   01/09/19 138/84       Date of last diabetes office visit: 10/09/2019     Tobacco History:     History   Smoking Status     Current Some Day Smoker     Packs/day: 0.30   Smokeless Tobacco     Never Used           IVD   ASA: MONITOR    Last LDL:    Lab Results   Component Value Date    CHOL 163 10/09/2019     Lab Results   Component Value Date    HDL 36 10/09/2019     Lab Results   Component Value Date    LDL 91 10/09/2019     Lab Results   Component Value Date    TRIG 181 10/09/2019      No results found for: VIOLETTEELIOTERICKSON     Is the patient on a Statin? YES   Is the patient on Aspirin? YES                  Medications     HMG CoA Reductase Inhibitors     simvastatin (ZOCOR) 20 MG tablet       Salicylates     aspirin (ASA) 81 MG EC tablet             Last three blood pressure readings:  BP Readings from Last 3 Encounters:   10/09/19 (!) 140/78   05/28/19 156/67   01/09/19 138/84        Tobacco History:     History   Smoking Status     Current Some  Day Smoker     Packs/day: 0.30   Smokeless Tobacco     Never Used       Hypertension   Last three blood pressure readings:  BP Readings from Last 3 Encounters:   10/09/19 (!) 140/78   05/28/19 156/67   01/09/19 138/84     Blood pressure: MONITOR    HTN Guidelines:  Less than 140/90      Composite cancer screening  Chart review shows that this patient is due/due soon for the following Colonoscopy  Summary:    Patient is due/failing the following:   COLONOSCOPY and PHYSICAL    Action needed:   Patient needs office visit for WELLNESS EXAM. and Patient needs referral/order: COLONOSCOPY    Type of outreach:    Sent letter.    Questions for provider review:    None                                                                                                                                    Princess CHRISTIANO Malloy CMA       Chart routed to none .

## 2019-11-08 NOTE — LETTER
[facility]  [address]  [place] [id number] [phone]                                                                                                           Radha Uribe  [address] [facility] [address]    November 8, 2019      Dear Radha,    I hope you are doing well.       This is a reminder that you are due for a Wellness Visit (annual full physical).   So that you get your desired appointment time, please call [phone] to schedule this or you can use [employer] if you have an account. If you have had this visit completed elsewhere, or you believe you received this letter in error, please contact us at [phone].    Lab work may be done at this visit.  Please prepare to fast-nothing to eat except water and/or meds for 8-10 hours).    In addition, here is a list of due or overdue Health Maintenance reminders.    Health Maintenance Due   Topic Date Due     Colonoscopy 06/23/2013     Annual Wellness Visit  01/29/2016     Pneumococcal Vaccine (1 of 2 - PCV13) 01/29/2016     Diabetic Foot Exam  12/06/2018     Eye Exam  12/06/2018     Have a great day!    Princess CHRISTIANO Malloy CMA/Kodak Preston MD

## 2019-12-01 NOTE — PROGRESS NOTES
HISTORY OF PRESENT ILLNESS: Radha Uribe is a 67-year-old woman who was referred to our Oncology Clinic by Dr. Crespo for evaluation of a newly diagnosed breast cancer. Radha was in her usual health until last month, when she was asked to do a mammogram by her primary care doctor, Dr. Preston. She has not been doing mammogram for the last 20 years, and most of the time she does self breast examinations. She noticed a mass, which was not painful, in her left breast, and immediately went for a mammogram. The mammogram was done on 09/13/2013, which revealed a 2.5 x 2 x 0.8 cm mass, so she had an ultrasound-guided core needle biopsy on 09/17, which revealed infiltrating ductal carcinoma, [place] grade 3 (score 3 for tubule formation, 3 for nuclear atypia, 3 for mitosis). No angiolymphatic invasion was identified. There was a focal necrosis of invasive carcinoma identified, and no definite carcinoma in situ was identified. The greatest length of the needle biopsy at the left breast in the 2 o'clock position showed benign breast tissue with fibrocystic changes. There is a focal intraductal microcalcification identified. The tumor was 15-20% weakly to moderately positive for estrogen receptors and 5% positive for progesterone receptors. HER-2 was amplified. The ratio of HER-2/CEP17 signals were more than 5.9 by FISH analysis. The signal/nucleus was more than 19.4. She was seen by Dr. Crespo at the Breast Clinic on 09/24, who recommended to do an MRI of the breast due to density of the breast tissues, as well as referred her to the Breast Oncology Clinic for further staging workup and management of her breast cancer. MRI of the breast showed BI-RADS 6 and mass in the left breast at the 11 o'clock position that measures about 1.6 x 1.6 x 1.6 cm. The longest diameter in sagittal reconstruction was 1.9 cm. There is no evidence of axillary lymph node involvement on the MRI.   She had neoadjuvant chemotherapy , mastectomy and SNL  sampling which showed pathologic CR and no disease in the LN. This was followed by radiation therapy. She received 5000 cGy in 25 fractions, completed on 5/23/2014. She continued on treatment with herceptin for 13 cycles. This was completed on 12/9/2014.     TREATMENT SUMMARY:  A. Neoadjuvant chemotherapy.  The tumor was 15-20% weakly to moderately positive for estrogen receptors and 5% positive for progesterone receptors. HER-2 was amplified.  Path CR.  B.  Lumpectomy.  Radiation.  C.  Adjuvant Herceptin to complete 1 year.  Adjuvant letrozole.  D.  Continue adjuvant letrozole to complete 10 years.      FOLLOWUP NOTE      Letrozole for 10 years. Dexa     HISTORY OF PRESENT ILLNESS:  Radha returns to the clinic.  She is working full time.  She has an ECOG 0 performance status.  She denies pain, fatigue, depression but does have anxiety.  She refused a referral to Psychology.  She continues to smoke and refuses to undergo smoking cessation.  She says she likes IT.  She has anemia.  She is refusing a colonoscopy or upper endoscopy.  She has an elevated white count and some symptoms of sinusitis and she refuses ENT referral for treatment of sinusitis.      REVIEW OF SYSTEMS:  She denies dyspnea on exertion, chest pain, nausea or vomiting.  She has occasional constipation.  No bone pain, back pain or headache.  The remainder of a 10-point review of systems is negative.      She has mitral regurgitation.  She refuses an echocardiogram.      PHYSICAL EXAMINATION:   VITAL SIGNS:  Blood pressure 126/62, temperature 98.3, pulse 79, respirations 18.  O2 sat not noted, height 1.66 meters and weight 52.6 kg.   GENERAL:  Radha appeared generally well.  She has no alopecia.   HEENT:  Oropharynx is without lesions.   LYMPH:  There is no palpable cervical, supraclavicular, subclavicular or axillary lymphadenopathy.   BREASTS:  Right breast reveals no masses.  Examination of the left breast reveals a well-healed incision 2  fingerbreadths from the nipple-areolar complex at the 11-o'clock position without erythema or masses.  No masses in the left breast.  The left axillary incision is well healed without erythema or masses.   LUNGS:  Clear to percussion and auscultation.   HEART:  Regular rate and rhythm with a 2/6 systolic murmur at the left sternal border radiating to the apex.   ABDOMEN:  Soft and nontender without hepatosplenomegaly.   EXTREMITIES:  Without edema.   PSYCHIATRIC:  Mood and affect were normal.      LABORATORY DATA:  CMP is remarkable for a calculated GFR of 60 mL/minute, glucose elevated at 130.  CBC showed a WBC of 11.3, hemoglobin 11.4, platelets 258,000.  Mammogram today showed benign findings.     ASSESSMENT AND PLAN:   1.  Radha Uribe is a 69-year-old woman who was diagnosed in 2013 with a stage IIA, T2N2MX, invasive ductal carcinoma of the left breast.  Tumor was positive for estrogen receptor in 15%-20% of the cells, IA positive in 5%, HER2 was amplified.  Breast mass measured 2.5 x 2 x 0.8 cm in size at 11-o'clock position.  She was treated with neoadjuvant Taxol and Herceptin for 12 weeks followed by dose-dense AC for 4 cycles.  She had a pathologic complete response in the lumpectomy specimen.  She completed radiation in 04/2014.  She tolerated the treatment quite well.  She began letrozole and tolerated it quite well and has had no hot flashes, myalgias or arthralgias.  The plan is to continue the letrozole for a total of 10 years of therapy.   2.  Yearly mammography for surveillance.   3.  Anemia.  She refuses evaluation and refuses upper and lower endoscopy.  I discussed with her there could be a cancer risk associated with gastrointestinal bleeding, but she does not want it evaluated.  I offered a Hemoccult and I offered a molecular test for colon cancer and she refused these.   4.  Mitral regurgitation.  I did offer an echocardiogram. I discussed my recommendations and rationale.  She refused.   5.   I offered that we would check a CBC at a 3-month interval and she refused it.   6.  I discussed smoking cessation with her anf I discussed my recommendations and rationale. and she refused it.   7.  I did recommend weightbearing exercise.  She did refuse a DEXA scan. I discussed my recommendations and rationale.  She will continue with calcium and vitamin D.   8.  Sinusitis.  She has an elevated white count.  I offered an ENT referral and she refused it.  I discussed my recommendations and rationale.   9.  Follow up.  Follow up with me 5-22-20 with CBC, CMP. Follow up with me December 1 with CBC, CMP, mammogram.     Thank you for allowing us to continue to participate in Radha Uribe's care.      South Esparza MD      [facility]         I spent 45 minutes with the patient more than 50% of which was in counseling and coordination of care.

## 2019-12-03 ENCOUNTER — ANCILLARY PROCEDURE (OUTPATIENT)
Dept: MAMMOGRAPHY | Facility: CLINIC | Age: 69
End: 2019-12-03
Attending: INTERNAL MEDICINE
Payer: COMMERCIAL

## 2019-12-03 ENCOUNTER — APPOINTMENT (OUTPATIENT)
Dept: LAB | Facility: CLINIC | Age: 69
End: 2019-12-03
Attending: INTERNAL MEDICINE
Payer: COMMERCIAL

## 2019-12-03 ENCOUNTER — ONCOLOGY VISIT (OUTPATIENT)
Dept: ONCOLOGY | Facility: CLINIC | Age: 69
End: 2019-12-03
Attending: INTERNAL MEDICINE
Payer: COMMERCIAL

## 2019-12-03 VITALS
HEIGHT: 66 IN | OXYGEN SATURATION: 98 % | BODY MASS INDEX: 18.64 KG/M2 | DIASTOLIC BLOOD PRESSURE: 62 MMHG | RESPIRATION RATE: 18 BRPM | WEIGHT: 116 LBS | SYSTOLIC BLOOD PRESSURE: 126 MMHG | HEART RATE: 79 BPM | TEMPERATURE: 98.3 F

## 2019-12-03 DIAGNOSIS — C50.212 CARCINOMA OF UPPER-INNER QUADRANT OF LEFT BREAST IN FEMALE, ESTROGEN RECEPTOR POSITIVE (H): ICD-10-CM

## 2019-12-03 DIAGNOSIS — Z17.0 CARCINOMA OF UPPER-INNER QUADRANT OF LEFT BREAST IN FEMALE, ESTROGEN RECEPTOR POSITIVE (H): ICD-10-CM

## 2019-12-03 LAB
ALBUMIN SERPL-MCNC: 3.9 G/DL (ref 3.4–5)
ALP SERPL-CCNC: 74 U/L (ref 40–150)
ALT SERPL W P-5'-P-CCNC: 27 U/L (ref 0–50)
ANION GAP SERPL CALCULATED.3IONS-SCNC: 6 MMOL/L (ref 3–14)
AST SERPL W P-5'-P-CCNC: 17 U/L (ref 0–45)
BASOPHILS # BLD AUTO: 0.1 10E9/L (ref 0–0.2)
BASOPHILS NFR BLD AUTO: 0.8 %
BILIRUB SERPL-MCNC: 0.2 MG/DL (ref 0.2–1.3)
BUN SERPL-MCNC: 26 MG/DL (ref 7–30)
CALCIUM SERPL-MCNC: 9.3 MG/DL (ref 8.5–10.1)
CHLORIDE SERPL-SCNC: 101 MMOL/L (ref 94–109)
CO2 SERPL-SCNC: 27 MMOL/L (ref 20–32)
CREAT SERPL-MCNC: 0.96 MG/DL (ref 0.52–1.04)
DIFFERENTIAL METHOD BLD: ABNORMAL
EOSINOPHIL # BLD AUTO: 0.3 10E9/L (ref 0–0.7)
EOSINOPHIL NFR BLD AUTO: 2.8 %
ERYTHROCYTE [DISTWIDTH] IN BLOOD BY AUTOMATED COUNT: 12 % (ref 10–15)
GFR SERPL CREATININE-BSD FRML MDRD: 60 ML/MIN/{1.73_M2}
GLUCOSE SERPL-MCNC: 130 MG/DL (ref 70–99)
HCT VFR BLD AUTO: 33.6 % (ref 35–47)
HGB BLD-MCNC: 11.4 G/DL (ref 11.7–15.7)
IMM GRANULOCYTES # BLD: 0.1 10E9/L (ref 0–0.4)
IMM GRANULOCYTES NFR BLD: 0.4 %
LYMPHOCYTES # BLD AUTO: 2.5 10E9/L (ref 0.8–5.3)
LYMPHOCYTES NFR BLD AUTO: 22.3 %
MCH RBC QN AUTO: 30.5 PG (ref 26.5–33)
MCHC RBC AUTO-ENTMCNC: 33.9 G/DL (ref 31.5–36.5)
MCV RBC AUTO: 90 FL (ref 78–100)
MONOCYTES # BLD AUTO: 0.8 10E9/L (ref 0–1.3)
MONOCYTES NFR BLD AUTO: 7.3 %
NEUTROPHILS # BLD AUTO: 7.5 10E9/L (ref 1.6–8.3)
NEUTROPHILS NFR BLD AUTO: 66.4 %
NRBC # BLD AUTO: 0 10*3/UL
NRBC BLD AUTO-RTO: 0 /100
PLATELET # BLD AUTO: 258 10E9/L (ref 150–450)
POTASSIUM SERPL-SCNC: 4 MMOL/L (ref 3.4–5.3)
PROT SERPL-MCNC: 7.1 G/DL (ref 6.8–8.8)
RBC # BLD AUTO: 3.74 10E12/L (ref 3.8–5.2)
SODIUM SERPL-SCNC: 133 MMOL/L (ref 133–144)
WBC # BLD AUTO: 11.3 10E9/L (ref 4–11)

## 2019-12-03 PROCEDURE — G0463 HOSPITAL OUTPT CLINIC VISIT: HCPCS | Mod: ZF

## 2019-12-03 PROCEDURE — 36415 COLL VENOUS BLD VENIPUNCTURE: CPT

## 2019-12-03 PROCEDURE — 85025 COMPLETE CBC W/AUTO DIFF WBC: CPT | Performed by: INTERNAL MEDICINE

## 2019-12-03 PROCEDURE — 99214 OFFICE O/P EST MOD 30 MIN: CPT | Mod: ZP | Performed by: INTERNAL MEDICINE

## 2019-12-03 PROCEDURE — 80053 COMPREHEN METABOLIC PANEL: CPT | Performed by: INTERNAL MEDICINE

## 2019-12-03 ASSESSMENT — PAIN SCALES - GENERAL: PAINLEVEL: NO PAIN (0)

## 2019-12-03 ASSESSMENT — MIFFLIN-ST. JEOR: SCORE: 1065.14

## 2019-12-03 NOTE — NURSING NOTE
Chief Complaint   Patient presents with     Blood Draw     Labs drawn via  by RN in lab. VS taken.      Gustavo Phillips RN

## 2019-12-03 NOTE — LETTER
12/3/2019       RE: Radha Uribe  [address]  [place] [address]     Dear Colleague,    Thank you for referring your patient, Radha Uribe, to the [facility]. Please see a copy of my visit note below.    HISTORY OF PRESENT ILLNESS: Radha Uribe is a 67-year-old woman who was referred to our Oncology Clinic by Dr. Crespo for evaluation of a newly diagnosed breast cancer. Radha was in her usual health until last month, when she was asked to do a mammogram by her primary care doctor, Dr. Preston. She has not been doing mammogram for the last 20 years, and most of the time she does self breast examinations. She noticed a mass, which was not painful, in her left breast, and immediately went for a mammogram. The mammogram was done on 09/13/2013, which revealed a 2.5 x 2 x 0.8 cm mass, so she had an ultrasound-guided core needle biopsy on 09/17, which revealed infiltrating ductal carcinoma, [place] grade 3 (score 3 for tubule formation, 3 for nuclear atypia, 3 for mitosis). No angiolymphatic invasion was identified. There was a focal necrosis of invasive carcinoma identified, and no definite carcinoma in situ was identified. The greatest length of the needle biopsy at the left breast in the 2 o'clock position showed benign breast tissue with fibrocystic changes. There is a focal intraductal microcalcification identified. The tumor was 15-20% weakly to moderately positive for estrogen receptors and 5% positive for progesterone receptors. HER-2 was amplified. The ratio of HER-2/CEP17 signals were more than 5.9 by FISH analysis. The signal/nucleus was more than 19.4. She was seen by Dr. Crespo at the Breast Clinic on 09/24, who recommended to do an MRI of the breast due to density of the breast tissues, as well as referred her to the Breast Oncology Clinic for further staging workup and management of her breast cancer. MRI of the breast showed BI-RADS 6 and mass in the left breast at the 11  o'clock position that measures about 1.6 x 1.6 x 1.6 cm. The longest diameter in sagittal reconstruction was 1.9 cm. There is no evidence of axillary lymph node involvement on the MRI.   She had neoadjuvant chemotherapy , mastectomy and SNL sampling which showed pathologic CR and no disease in the LN. This was followed by radiation therapy. She received 5000 cGy in 25 fractions, completed on 5/23/2014. She continued on treatment with herceptin for 13 cycles. This was completed on 12/9/2014.     TREATMENT SUMMARY:  A. Neoadjuvant chemotherapy.  The tumor was 15-20% weakly to moderately positive for estrogen receptors and 5% positive for progesterone receptors. HER-2 was amplified.  Path CR.  B.  Lumpectomy.  Radiation.  C.  Adjuvant Herceptin to complete 1 year.  Adjuvant letrozole.  D.  Continue adjuvant letrozole to complete 10 years.      FOLLOWUP NOTE      Letrozole for 10 years. Dexa     HISTORY OF PRESENT ILLNESS:  Radha returns to the clinic.  She is working full time.  She has an ECOG 0 performance status.  She denies pain, fatigue, depression but does have anxiety.  She refused a referral to Psychology.  She continues to smoke and refuses to undergo smoking cessation.  She says she likes IT.  She has anemia.  She is refusing a colonoscopy or upper endoscopy.  She has an elevated white count and some symptoms of sinusitis and she refuses ENT referral for treatment of sinusitis.      REVIEW OF SYSTEMS:  She denies dyspnea on exertion, chest pain, nausea or vomiting.  She has occasional constipation.  No bone pain, back pain or headache.  The remainder of a 10-point review of systems is negative.      She has mitral regurgitation.  She refuses an echocardiogram.      PHYSICAL EXAMINATION:   VITAL SIGNS:  Blood pressure 126/62, temperature 98.3, pulse 79, respirations 18.  O2 sat not noted, height 1.66 meters and weight 52.6 kg.   GENERAL:  Radha appeared generally well.  She has no alopecia.   HEENT:   Oropharynx is without lesions.   LYMPH:  There is no palpable cervical, supraclavicular, subclavicular or axillary lymphadenopathy.   BREASTS:  Right breast reveals no masses.  Examination of the left breast reveals a well-healed incision 2 fingerbreadths from the nipple-areolar complex at the 11-o'clock position without erythema or masses.  No masses in the left breast.  The left axillary incision is well healed without erythema or masses.   LUNGS:  Clear to percussion and auscultation.   HEART:  Regular rate and rhythm with a 2/6 systolic murmur at the left sternal border radiating to the apex.   ABDOMEN:  Soft and nontender without hepatosplenomegaly.   EXTREMITIES:  Without edema.   PSYCHIATRIC:  Mood and affect were normal.      LABORATORY DATA:  CMP is remarkable for a calculated GFR of 60 mL/minute, glucose elevated at 130.  CBC showed a WBC of 11.3, hemoglobin 11.4, platelets 258,000.  Mammogram today showed benign findings.     ASSESSMENT AND PLAN:   1.  Radha Uribe is a 69-year-old woman who was diagnosed in 2013 with a stage IIA, T2N2MX, invasive ductal carcinoma of the left breast.  Tumor was positive for estrogen receptor in 15%-20% of the cells, [place] positive in 5%, HER2 was amplified.  Breast mass measured 2.5 x 2 x 0.8 cm in size at 11-o'clock position.  She was treated with neoadjuvant Taxol and Herceptin for 12 weeks followed by dose-dense AC for 4 cycles.  She had a pathologic complete response in the lumpectomy specimen.  She completed radiation in 04/2014.  She tolerated the treatment quite well.  She began letrozole and tolerated it quite well and has had no hot flashes, myalgias or arthralgias.  The plan is to continue the letrozole for a total of 10 years of therapy.   2.  Yearly mammography for surveillance.   3.  Anemia.  She refuses evaluation and refuses upper and lower endoscopy.  I discussed with her there could be a cancer risk associated with gastrointestinal bleeding, but she does  not want it evaluated.  I offered a Hemoccult and I offered a molecular test for colon cancer and she refused these.   4.  Mitral regurgitation.  I did offer an echocardiogram. I discussed my recommendations and rationale.  She refused.   5.  I offered that we would check a CBC at a 3-month interval and she refused it.   6.  I discussed smoking cessation with her anf I discussed my recommendations and rationale. and she refused it.   7.  I did recommend weightbearing exercise.  She did refuse a DEXA scan. I discussed my recommendations and rationale.  She will continue with calcium and vitamin D.   8.  Sinusitis.  She has an elevated white count.  I offered an ENT referral and she refused it.  I discussed my recommendations and rationale.   9.  Follow up.  Follow up with me 5-22-20 with CBC, CMP. Follow up with me December 1 with CBC, CMP, mammogram.     Thank you for allowing us to continue to participate in Radha Urieb's care.      South Esparza MD      [facility]     I spent 45 minutes with the patient more than 50% of which was in counseling and coordination of care.

## 2019-12-03 NOTE — NURSING NOTE
"Oncology Rooming Note    December 3, 2019 11:34 AM   Radha Uribe is a 69 year old female who presents for:    Chief Complaint   Patient presents with     Blood Draw     Labs drawn via  by RN in lab. VS taken.      Oncology Clinic Visit     Return visit. BREAST CANCER     Initial Vitals: /62 (BP Location: Right arm, Patient Position: Sitting, Cuff Size: Adult Regular)   Pulse 79   Temp 98.3  F (36.8  C) (Oral)   Resp 18   Ht 1.664 m (5' 5.51\")   Wt 52.6 kg (116 lb)   LMP  (LMP Unknown)   SpO2 98%   BMI 19.00 kg/m   Estimated body mass index is 19 kg/m  as calculated from the following:    Height as of this encounter: 1.664 m (5' 5.51\").    Weight as of this encounter: 52.6 kg (116 lb). Body surface area is 1.56 meters squared.  No Pain (0) Comment: Data Unavailable   No LMP recorded (lmp unknown). Patient has had a hysterectomy.  Allergies reviewed: Yes  Medications reviewed: Yes    Medications: Medication refills not needed today.  Pharmacy name entered into [employer]: [id number] [place], [place] - [address] [place] [address]    Clinical concerns: No additional concerns.  Dr. Esparza was notified.      Margi Estrella, LAUREN              "

## 2020-02-06 DIAGNOSIS — E78.00 HYPERCHOLESTEROLEMIA: ICD-10-CM

## 2020-02-06 RX ORDER — SIMVASTATIN 20 MG
TABLET ORAL
Qty: 90 TABLET | Refills: 1 | Status: SHIPPED | OUTPATIENT
Start: 2020-02-06 | End: 2020-07-14

## 2020-02-06 NOTE — TELEPHONE ENCOUNTER
"Requested Prescriptions   Pending Prescriptions Disp Refills     simvastatin (ZOCOR) 20 MG tablet [Pharmacy Med Name: SIMVASTATIN 20 MG TABLET]    Last Written Prescription Date:  01/09/2019  Last Fill Quantity: 90 tablet,  # refills: 2   Last office visit: 10/9/2019 with prescribing provider:  Mohan   Future Office Visit:     30 tablet 8     Sig: TAKE 1 TABLET BY MOUTH EVERY DAY       Statins Protocol Passed - 2/6/2020  4:30 AM        Passed - LDL on file in past 12 months     Recent Labs   Lab Test 10/09/19  1454   LDL 91             Passed - No abnormal creatine kinase in past 12 months     No lab results found.             Passed - Recent (12 mo) or future (30 days) visit within the authorizing provider's specialty     Patient has had an office visit with the authorizing provider or a provider within the authorizing providers department within the previous 12 mos or has a future within next 30 days. See \"Patient Info\" tab in inbasket, or \"Choose Columns\" in Meds & Orders section of the refill encounter.              Passed - Medication is active on med list        Passed - Patient is age 18 or older        Passed - No active pregnancy on record        Passed - No positive pregnancy test in past 12 months           "

## 2020-02-12 DIAGNOSIS — E11.9 TYPE 2 DIABETES MELLITUS WITHOUT COMPLICATION, WITHOUT LONG-TERM CURRENT USE OF INSULIN (H): ICD-10-CM

## 2020-02-12 NOTE — TELEPHONE ENCOUNTER
Requested Prescriptions   Pending Prescriptions Disp Refills     metFORMIN (GLUCOPHAGE) 500 MG tablet [Pharmacy Med Name: METFORMIN  MG TABLET]  Last Written Prescription Date:  1/9/2019  Last Fill Quantity: 60 tablet,  # refills: 5   Last office visit: 10/9/2019 with prescribing provider:  Mohan   Future Office Visit:     60 tablet 5     Sig: TAKE 1 TABLET (500 MG) BY MOUTH 2 TIMES DAILY (WITH MEALS)       Biguanide Agents Passed - 2/12/2020  4:19 PM        Passed - Blood pressure less than 140/90 in past 6 months     BP Readings from Last 3 Encounters:   12/03/19 126/62   10/09/19 (!) 140/78   05/28/19 156/67                 Passed - Patient has documented LDL within the past 12 mos.     Recent Labs   Lab Test 10/09/19  1454   LDL 91             Passed - Patient has had a Microalbumin in the past 15 mos.     Recent Labs   Lab Test 10/09/19  1455   MICROL <5   UMALCR Unable to calculate due to low value             Passed - Patient is age 10 or older        Passed - Patient has documented A1c within the specified period of time.     If HgbA1C is 8 or greater, it needs to be on file within the past 3 months.  If less than 8, must be on file within the past 6 months.     Recent Labs   Lab Test 10/09/19  1454   A1C 6.5*             Passed - Patient's CR is NOT>1.4 OR Patient's EGFR is NOT<45 within past 12 mos.     Recent Labs   Lab Test 12/03/19  1016   GFRESTIMATED 60*   GFRESTBLACK 70       Recent Labs   Lab Test 12/03/19  1016   CR 0.96             Passed - Patient does NOT have a diagnosis of CHF.        Passed - Medication is active on med list        Passed - Patient is not pregnant        Passed - Patient has not had a positive pregnancy test within the past 12 mos.         Passed - Recent (6 mo) or future (30 days) visit within the authorizing provider's specialty     Patient had office visit in the last 6 months or has a visit in the next 30 days with authorizing provider or within the authorizing  "provider's specialty.  See \"Patient Info\" tab in inbasket, or \"Choose Columns\" in Meds & Orders section of the refill encounter.               "

## 2020-02-13 NOTE — TELEPHONE ENCOUNTER
Prescription approved per [facility] Refill Protocol for 6 months of refills since last appointment, which was 10/09/19

## 2020-03-02 DIAGNOSIS — I10 HYPERTENSION GOAL BP (BLOOD PRESSURE) < 140/90: ICD-10-CM

## 2020-03-02 NOTE — TELEPHONE ENCOUNTER
"Requested Prescriptions   Pending Prescriptions Disp Refills     hydrochlorothiazide (MICROZIDE) 12.5 MG capsule [Pharmacy Med Name: HYDROCHLOROTHIAZIDE 12.5 MG CP]  Last Written Prescription Date:  8/15/2019  Last Fill Quantity: 30 capsule,  # refills: 5   Last office visit: 10/9/2019 with prescribing provider:  Mohan   Future Office Visit:     30 capsule 5     Sig: TAKE 1 CAPSULE BY MOUTH DAILY. NEED BLOOD PRESSURE CHECK       Diuretics (Including Combos) Protocol Passed - 3/2/2020  1:44 AM        Passed - Blood pressure under 140/90 in past 12 months     BP Readings from Last 3 Encounters:   12/03/19 126/62   10/09/19 (!) 140/78   05/28/19 156/67                 Passed - Recent (12 mo) or future (30 days) visit within the authorizing provider's specialty     Patient has had an office visit with the authorizing provider or a provider within the authorizing providers department within the previous 12 mos or has a future within next 30 days. See \"Patient Info\" tab in inbasket, or \"Choose Columns\" in Meds & Orders section of the refill encounter.              Passed - Medication is active on med list        Passed - Patient is age 18 or older        Passed - No active pregancy on record        Passed - Normal serum creatinine on file in past 12 months     Recent Labs   Lab Test 12/03/19  1016   CR 0.96              Passed - Normal serum potassium on file in past 12 months     Recent Labs   Lab Test 12/03/19  1016   POTASSIUM 4.0                    Passed - Normal serum sodium on file in past 12 months     Recent Labs   Lab Test 12/03/19  1016                 Passed - No positive pregnancy test in past 12 months           "

## 2020-03-03 RX ORDER — HYDROCHLOROTHIAZIDE 12.5 MG/1
CAPSULE ORAL
Qty: 90 CAPSULE | Refills: 1 | Status: SHIPPED | OUTPATIENT
Start: 2020-03-03 | End: 2020-07-14

## 2020-03-03 NOTE — TELEPHONE ENCOUNTER
Prescription approved per [facility] Refill Protocol for 12 months of refills since last appointment, which was 10/09/19

## 2020-03-15 ENCOUNTER — HEALTH MAINTENANCE LETTER (OUTPATIENT)
Age: 70
End: 2020-03-15

## 2020-03-20 DIAGNOSIS — E11.9 TYPE 2 DIABETES MELLITUS WITHOUT COMPLICATION, WITHOUT LONG-TERM CURRENT USE OF INSULIN (H): ICD-10-CM

## 2020-03-20 NOTE — TELEPHONE ENCOUNTER
"Requested Prescriptions   Pending Prescriptions Disp Refills     metFORMIN (GLUCOPHAGE) 500 MG tablet  Last Written Prescription Date:  2/13/2020  Last Fill Quantity: 60 tablet,  # refills: 1   Last office visit: 10/9/2019 with prescribing provider:  Mohan   Future Office Visit:     60 tablet 1     Sig: Take 1 tablet (500 mg) by mouth 2 times daily (with meals)       Biguanide Agents Passed - 3/20/2020 10:13 AM        Passed - Blood pressure less than 140/90 in past 6 months     BP Readings from Last 3 Encounters:   12/03/19 126/62   10/09/19 (!) 140/78   05/28/19 156/67                 Passed - Patient has documented LDL within the past 12 mos.     Recent Labs   Lab Test 10/09/19  1454   LDL 91             Passed - Patient has had a Microalbumin in the past 15 mos.     Recent Labs   Lab Test 10/09/19  1455   MICROL <5   UMALCR Unable to calculate due to low value             Passed - Patient is age 10 or older        Passed - Patient has documented A1c within the specified period of time.     If HgbA1C is 8 or greater, it needs to be on file within the past 3 months.  If less than 8, must be on file within the past 6 months.     Recent Labs   Lab Test 10/09/19  1454   A1C 6.5*             Passed - Patient's CR is NOT>1.4 OR Patient's EGFR is NOT<45 within past 12 mos.     Recent Labs   Lab Test 12/03/19  1016   GFRESTIMATED 60*   GFRESTBLACK 70       Recent Labs   Lab Test 12/03/19  1016   CR 0.96             Passed - Patient does NOT have a diagnosis of CHF.        Passed - Medication is active on med list        Passed - Patient is not pregnant        Passed - Patient has not had a positive pregnancy test within the past 12 mos.         Passed - Recent (6 mo) or future (30 days) visit within the authorizing provider's specialty     Patient had office visit in the last 6 months or has a visit in the next 30 days with authorizing provider or within the authorizing provider's specialty.  See \"Patient Info\" tab in " "inbasket, or \"Choose Columns\" in Meds & Orders section of the refill encounter.                  "

## 2020-03-20 NOTE — TELEPHONE ENCOUNTER
Pharmacy Contact    Called pharmacy to clarify request. They state that patient picked up partial fill and will need more.     Medication is being filled for 1 time refill only due to:  Due for follow-up office visit.

## 2020-03-27 NOTE — TELEPHONE ENCOUNTER
Tobacco Treatment Program at the [facility] attempted to reach Ms. Uribe on 3/27/2020 regarding the tobacco cessation program to help Ms. Uribe to quit smoking.     Lupe Schmitt CTTS  Tobacco Treatment Specialist  PH: [phone]

## 2020-05-06 NOTE — TELEPHONE ENCOUNTER
Tobacco Treatment Program at the [facility] attempted to reach Ms. Uribe on 5/6/2020 regarding the tobacco cessation program to help Ms. Uribe to quit smoking. We will attempt to reach Ms. Uribe another time.     Lupe Schmitt [facility]  Tobacco Treatment Specialist  PH: [phone]

## 2020-05-21 ENCOUNTER — PATIENT OUTREACH (OUTPATIENT)
Dept: ONCOLOGY | Facility: CLINIC | Age: 70
End: 2020-05-21

## 2020-05-21 NOTE — PROGRESS NOTES
Called patient's relative Shanelle Leos with permission to speak to her with inability to reach patient by multiple staff for missed appointment today with Dr Esparza. Left a VM to return call to writer.  Reviewed writer has permission to speak to Shanelle and requested her to return call to writer to verify patient is able to re-schedule an appointment with Dr Esparza. Kathy Drummond RN, BSN  [facility] Nurse Coordinator

## 2020-05-27 DIAGNOSIS — I10 ESSENTIAL HYPERTENSION, BENIGN: ICD-10-CM

## 2020-05-27 RX ORDER — LOSARTAN POTASSIUM 100 MG/1
100 TABLET ORAL DAILY
Qty: 30 TABLET | Refills: 4 | Status: SHIPPED | OUTPATIENT
Start: 2020-05-27 | End: 2020-07-15

## 2020-07-14 DIAGNOSIS — F17.200 TOBACCO DEPENDENCE SYNDROME: ICD-10-CM

## 2020-07-14 DIAGNOSIS — C50.212 BREAST CANCER OF UPPER-INNER QUADRANT OF LEFT FEMALE BREAST (H): ICD-10-CM

## 2020-07-14 DIAGNOSIS — E11.9 TYPE 2 DIABETES MELLITUS WITHOUT COMPLICATION, WITHOUT LONG-TERM CURRENT USE OF INSULIN (H): ICD-10-CM

## 2020-07-14 DIAGNOSIS — Z17.0 CARCINOMA OF UPPER-INNER QUADRANT OF LEFT BREAST IN FEMALE, ESTROGEN RECEPTOR POSITIVE (H): Primary | ICD-10-CM

## 2020-07-14 DIAGNOSIS — E11.9 CONTROLLED TYPE 2 DIABETES MELLITUS WITHOUT COMPLICATION, WITHOUT LONG-TERM CURRENT USE OF INSULIN (H): ICD-10-CM

## 2020-07-14 DIAGNOSIS — I10 ESSENTIAL HYPERTENSION, BENIGN: ICD-10-CM

## 2020-07-14 DIAGNOSIS — I10 HYPERTENSION GOAL BP (BLOOD PRESSURE) < 140/90: ICD-10-CM

## 2020-07-14 DIAGNOSIS — C50.212 CARCINOMA OF UPPER-INNER QUADRANT OF LEFT BREAST IN FEMALE, ESTROGEN RECEPTOR POSITIVE (H): Primary | ICD-10-CM

## 2020-07-14 DIAGNOSIS — E78.00 HYPERCHOLESTEROLEMIA: ICD-10-CM

## 2020-07-14 NOTE — TELEPHONE ENCOUNTER
Reason for Call:  Other prescription    Detailed comments: The patient mailed a letter to the [facility] requesting medication refills on all of her prescriptions.  She stated that she is changing her pharmacy to the [facility].  She wrote the following in the letter:     Nothing has changed since my last visit.  Have been eating more sweets and am smoking a pack of cigarettes every other day.  For A1C lab can do it at [facility].  My B/P on 7/3 was 120/73 Weight- 114lbs.  Have no computer or cell phone. I'm sad you closed the [facility] it was easy to get to now I wont be coming to [facility] only if necessary. It takes about $200 by cab. Wish you a good summer.     Radha CORONEL tried calling patient at Home phone number to get more information on which medications need to be filled but it just rang and there was no option to leave a message.     Phone Number Patient can be reached at: Home number on file [phone] (home)    Best Time: Any    Can we leave a detailed message on this number? Not Applicable    Call taken on 7/14/2020 at 12:04 PM by Griselda Robins

## 2020-07-14 NOTE — TELEPHONE ENCOUNTER
Routing refill request to provider for review/approval because:  See patient's message below - sent personal letter to the clinic.   Was due to follow-up in April, but states cannot come in easily due to cost of transportation

## 2020-07-14 NOTE — TELEPHONE ENCOUNTER
Patient Contact    Attempt # 1    Was call answered?  No.  No answer.    On call back - what medications does she need refilled?     RN pended orders for all medications with new pharmacy, routing to refill pool to review.

## 2020-07-14 NOTE — LETTER
[facility]  [address]  [facility] [place] [facility] [address]  [phone]                                                                                                           Radha Uribe  [address] [place] [id number]    July 14, 2020      Dear Radha,    We received your letter in the mail indicating you are in need of refills for your medications. We have some questions for you regarding your medications and preferred pharmacy. We tried reaching you by phone but were unable to contact you. Please call us at [phone] to update your contact information and to answer questions about your refill request.    Our records indicate that you are due for an office visit. We recognize it is expensive to take a cab to the [facility]. We would be happy to help you locate a clinic that is closer to where you live.     Thank you for choosing [facility].  We appreciate the opportunity to serve you and look forward to supporting your healthcare needs in the future.    If you have any questions or concerns, please call me or my staff at [phone].      Sincerely,    Kodak Preston MD

## 2020-07-15 RX ORDER — LETROZOLE 2.5 MG/1
2.5 TABLET, FILM COATED ORAL DAILY
Qty: 90 TABLET | Refills: 3 | Status: SHIPPED | OUTPATIENT
Start: 2020-07-15 | End: 2020-12-15

## 2020-07-15 RX ORDER — METOPROLOL SUCCINATE 100 MG/1
100 TABLET, EXTENDED RELEASE ORAL DAILY
Qty: 30 TABLET | Refills: 1 | Status: SHIPPED | OUTPATIENT
Start: 2020-07-15 | End: 2020-08-20

## 2020-07-15 RX ORDER — POLYETHYLENE GLYCOL 3350 17 G
2 POWDER IN PACKET (EA) ORAL
Qty: 90 LOZENGE | Refills: 1 | Status: SHIPPED | OUTPATIENT
Start: 2020-07-15 | End: 2021-09-22

## 2020-07-15 RX ORDER — LOSARTAN POTASSIUM 100 MG/1
100 TABLET ORAL DAILY
Qty: 30 TABLET | Refills: 4 | Status: SHIPPED | OUTPATIENT
Start: 2020-07-15 | End: 2020-08-20

## 2020-07-15 RX ORDER — HYDROCHLOROTHIAZIDE 12.5 MG/1
CAPSULE ORAL
Qty: 90 CAPSULE | Refills: 0 | Status: SHIPPED | OUTPATIENT
Start: 2020-07-15 | End: 2020-08-20

## 2020-07-15 RX ORDER — SIMVASTATIN 20 MG
20 TABLET ORAL DAILY
Qty: 90 TABLET | Refills: 1 | Status: SHIPPED | OUTPATIENT
Start: 2020-07-15 | End: 2020-08-20

## 2020-08-20 ENCOUNTER — OFFICE VISIT (OUTPATIENT)
Dept: FAMILY MEDICINE | Facility: CLINIC | Age: 70
End: 2020-08-20
Payer: COMMERCIAL

## 2020-08-20 VITALS
DIASTOLIC BLOOD PRESSURE: 82 MMHG | OXYGEN SATURATION: 99 % | HEART RATE: 71 BPM | WEIGHT: 112 LBS | BODY MASS INDEX: 18.35 KG/M2 | RESPIRATION RATE: 16 BRPM | SYSTOLIC BLOOD PRESSURE: 120 MMHG

## 2020-08-20 DIAGNOSIS — E78.00 HYPERCHOLESTEROLEMIA: ICD-10-CM

## 2020-08-20 DIAGNOSIS — I10 ESSENTIAL HYPERTENSION, BENIGN: ICD-10-CM

## 2020-08-20 DIAGNOSIS — I10 HYPERTENSION GOAL BP (BLOOD PRESSURE) < 140/90: ICD-10-CM

## 2020-08-20 DIAGNOSIS — E11.9 TYPE 2 DIABETES MELLITUS WITHOUT COMPLICATION, WITHOUT LONG-TERM CURRENT USE OF INSULIN (H): ICD-10-CM

## 2020-08-20 LAB — HBA1C MFR BLD: 6.1 % (ref 0–5.6)

## 2020-08-20 PROCEDURE — 83036 HEMOGLOBIN GLYCOSYLATED A1C: CPT | Performed by: FAMILY MEDICINE

## 2020-08-20 PROCEDURE — 80061 LIPID PANEL: CPT | Performed by: FAMILY MEDICINE

## 2020-08-20 PROCEDURE — 82043 UR ALBUMIN QUANTITATIVE: CPT | Performed by: FAMILY MEDICINE

## 2020-08-20 PROCEDURE — 99214 OFFICE O/P EST MOD 30 MIN: CPT | Performed by: FAMILY MEDICINE

## 2020-08-20 PROCEDURE — 80048 BASIC METABOLIC PNL TOTAL CA: CPT | Performed by: FAMILY MEDICINE

## 2020-08-20 PROCEDURE — 84460 ALANINE AMINO (ALT) (SGPT): CPT | Performed by: FAMILY MEDICINE

## 2020-08-20 PROCEDURE — 84443 ASSAY THYROID STIM HORMONE: CPT | Performed by: FAMILY MEDICINE

## 2020-08-20 PROCEDURE — 36415 COLL VENOUS BLD VENIPUNCTURE: CPT | Performed by: FAMILY MEDICINE

## 2020-08-20 PROCEDURE — 84450 TRANSFERASE (AST) (SGOT): CPT | Performed by: FAMILY MEDICINE

## 2020-08-20 RX ORDER — HYDROCHLOROTHIAZIDE 12.5 MG/1
CAPSULE ORAL
Qty: 90 CAPSULE | Refills: 1 | Status: SHIPPED | OUTPATIENT
Start: 2020-08-20 | End: 2021-03-11

## 2020-08-20 RX ORDER — SIMVASTATIN 20 MG
20 TABLET ORAL DAILY
Qty: 90 TABLET | Refills: 1 | Status: SHIPPED | OUTPATIENT
Start: 2020-08-20 | End: 2021-03-11

## 2020-08-20 RX ORDER — METOPROLOL SUCCINATE 100 MG/1
100 TABLET, EXTENDED RELEASE ORAL DAILY
Qty: 90 TABLET | Refills: 1 | Status: SHIPPED | OUTPATIENT
Start: 2020-08-20 | End: 2021-03-11

## 2020-08-20 RX ORDER — LOSARTAN POTASSIUM 100 MG/1
100 TABLET ORAL DAILY
Qty: 90 TABLET | Refills: 1 | Status: SHIPPED | OUTPATIENT
Start: 2020-08-20 | End: 2021-03-11

## 2020-08-20 NOTE — PROGRESS NOTES
Subjective     Radha Uribe is a 70 year old female who presents to clinic today for the following health issues:    HPI       Diabetes Follow-up      How often are you checking your blood sugar? Not at all    What concerns do you have today about your diabetes? None     Do you have any of these symptoms? (Select all that apply)  No numbness or tingling in feet.  No redness, sores or blisters on feet.  No complaints of excessive thirst.  No reports of blurry vision.  No significant changes to weight.    Have you had a diabetic eye exam in the last 12 months? No          Hyperlipidemia Follow-Up      Are you regularly taking any medication or supplement to lower your cholesterol?   Yes- Simvastatin    Are you having muscle aches or other side effects that you think could be caused by your cholesterol lowering medication?  No    Hypertension Follow-up      Do you check your blood pressure regularly outside of the clinic? No     Are you following a low salt diet? Yes    Are your blood pressures ever more than 140 on the top number (systolic) OR more   than 90 on the bottom number (diastolic), for example 140/90? No    BP Readings from Last 2 Encounters:   08/20/20 120/82   12/03/19 126/62     Hemoglobin A1C (%)   Date Value   10/09/2019 6.5 (H)   01/09/2019 6.7 (H)     LDL Cholesterol Calculated (mg/dL)   Date Value   10/09/2019 91   01/09/2019 70         How many servings of fruits and vegetables do you eat daily?  4 or more    On average, how many sweetened beverages do you drink each day (Examples: soda, juice, sweet tea, etc.  Do NOT count diet or artificially sweetened beverages)?   0    How many days per week do you exercise enough to make your heart beat faster? 5    How many minutes a day do you exercise enough to make your heart beat faster? 20 - 29    How many days per week do you miss taking your medication? 0        Review of Systems   CONSTITUTIONAL: NEGATIVE for fever, chills, change in  weight  ENT/MOUTH: NEGATIVE for ear, mouth and throat problems  RESP: NEGATIVE for significant cough or SOB  CV: NEGATIVE for chest pain, palpitations or peripheral edema  ENDOCRINE: NEGATIVE for temperature intolerance, skin/hair changes and POSITIVE  for HX diabetes      Objective    /82   Pulse 71   Resp 16   Wt 50.8 kg (112 lb)   LMP  (LMP Unknown)   SpO2 99%   BMI 18.35 kg/m    Body mass index is 18.35 kg/m .  Physical Exam   GENERAL: healthy, alert and no distress  NECK: no adenopathy, no asymmetry, masses, or scars and thyroid normal to palpation  RESP: lungs clear to auscultation - no rales, rhonchi or wheezes  CV: regular rate and rhythm, normal S1 S2, no S3 or S4, no murmur, click or rub, no peripheral edema and peripheral pulses strong  ABDOMEN: soft, nontender, no hepatosplenomegaly, no masses and bowel sounds normal  MS: no gross musculoskeletal defects noted, no edema    Lab: see below, results pending        Assessment & Plan     Radha was seen today for diabetes.    Diagnoses and all orders for this visit:    Type 2 diabetes mellitus without complication, without long-term current use of insulin (H)  -     HEMOGLOBIN A1C  -     metFORMIN (GLUCOPHAGE) 500 MG tablet; Take 1 tablet (500 mg) by mouth 2 times daily (with meals)  -     Basic metabolic panel  -     TSH with free T4 reflex  -     Albumin Random Urine Quantitative with Creat Ratio    Essential hypertension, benign  -     metoprolol succinate ER (TOPROL-XL) 100 MG 24 hr tablet; Take 1 tablet (100 mg) by mouth daily  -     losartan (COZAAR) 100 MG tablet; Take 1 tablet (100 mg) by mouth daily    Hypercholesterolemia  -     simvastatin (ZOCOR) 20 MG tablet; Take 1 tablet (20 mg) by mouth daily  -     Lipid panel reflex to direct LDL Fasting  -     ALT  -     AST    Hypertension goal BP (blood pressure) < 140/90  -     hydrochlorothiazide (MICROZIDE) 12.5 MG capsule; TAKE 1 CAPSULE BY MOUTH DAILY. NEED BLOOD PRESSURE CHECK            FUTURE APPOINTMENTS:       - Follow-up visit in 6 mo   Patient Instructions   Keep watching diet, limiting carbohydrates and sweets      Return in about 1 month (around 9/20/2020) for Diabetes, hypercholesterol, Hypertension.    Kodak Preston MD  [facility]

## 2020-08-21 LAB
ALT SERPL W P-5'-P-CCNC: 19 U/L (ref 0–50)
ANION GAP SERPL CALCULATED.3IONS-SCNC: 6 MMOL/L (ref 3–14)
AST SERPL W P-5'-P-CCNC: 16 U/L (ref 0–45)
BUN SERPL-MCNC: 18 MG/DL (ref 7–30)
CALCIUM SERPL-MCNC: 9.2 MG/DL (ref 8.5–10.1)
CHLORIDE SERPL-SCNC: 101 MMOL/L (ref 94–109)
CHOLEST SERPL-MCNC: 170 MG/DL
CO2 SERPL-SCNC: 27 MMOL/L (ref 20–32)
CREAT SERPL-MCNC: 0.89 MG/DL (ref 0.52–1.04)
CREAT UR-MCNC: 29 MG/DL
GFR SERPL CREATININE-BSD FRML MDRD: 66 ML/MIN/{1.73_M2}
GLUCOSE SERPL-MCNC: 110 MG/DL (ref 70–99)
HDLC SERPL-MCNC: 38 MG/DL
LDLC SERPL CALC-MCNC: 96 MG/DL
MICROALBUMIN UR-MCNC: <5 MG/L
MICROALBUMIN/CREAT UR: NORMAL MG/G CR (ref 0–25)
NONHDLC SERPL-MCNC: 132 MG/DL
POTASSIUM SERPL-SCNC: 4.1 MMOL/L (ref 3.4–5.3)
SODIUM SERPL-SCNC: 134 MMOL/L (ref 133–144)
TRIGL SERPL-MCNC: 179 MG/DL
TSH SERPL DL<=0.005 MIU/L-ACNC: 0.6 MU/L (ref 0.4–4)

## 2020-12-08 ENCOUNTER — ANCILLARY PROCEDURE (OUTPATIENT)
Dept: MAMMOGRAPHY | Facility: CLINIC | Age: 70
End: 2020-12-08
Attending: INTERNAL MEDICINE
Payer: COMMERCIAL

## 2020-12-08 DIAGNOSIS — Z17.0 CARCINOMA OF UPPER-INNER QUADRANT OF LEFT BREAST IN FEMALE, ESTROGEN RECEPTOR POSITIVE (H): ICD-10-CM

## 2020-12-08 DIAGNOSIS — C50.212 CARCINOMA OF UPPER-INNER QUADRANT OF LEFT BREAST IN FEMALE, ESTROGEN RECEPTOR POSITIVE (H): ICD-10-CM

## 2020-12-08 LAB
ALBUMIN SERPL-MCNC: 4.1 G/DL (ref 3.4–5)
ALP SERPL-CCNC: 77 U/L (ref 40–150)
ALT SERPL W P-5'-P-CCNC: 28 U/L (ref 0–50)
ANION GAP SERPL CALCULATED.3IONS-SCNC: 6 MMOL/L (ref 3–14)
AST SERPL W P-5'-P-CCNC: 18 U/L (ref 0–45)
BASOPHILS # BLD AUTO: 0.1 10E9/L (ref 0–0.2)
BASOPHILS NFR BLD AUTO: 0.9 %
BILIRUB SERPL-MCNC: 0.3 MG/DL (ref 0.2–1.3)
BUN SERPL-MCNC: 19 MG/DL (ref 7–30)
CALCIUM SERPL-MCNC: 9.2 MG/DL (ref 8.5–10.1)
CHLORIDE SERPL-SCNC: 101 MMOL/L (ref 94–109)
CO2 SERPL-SCNC: 29 MMOL/L (ref 20–32)
CREAT SERPL-MCNC: 0.88 MG/DL (ref 0.52–1.04)
DIFFERENTIAL METHOD BLD: ABNORMAL
EOSINOPHIL # BLD AUTO: 0.2 10E9/L (ref 0–0.7)
EOSINOPHIL NFR BLD AUTO: 2.1 %
ERYTHROCYTE [DISTWIDTH] IN BLOOD BY AUTOMATED COUNT: 12.7 % (ref 10–15)
GFR SERPL CREATININE-BSD FRML MDRD: 67 ML/MIN/{1.73_M2}
GLUCOSE SERPL-MCNC: 145 MG/DL (ref 70–99)
HCT VFR BLD AUTO: 36.4 % (ref 35–47)
HGB BLD-MCNC: 11.9 G/DL (ref 11.7–15.7)
IMM GRANULOCYTES # BLD: 0.1 10E9/L (ref 0–0.4)
IMM GRANULOCYTES NFR BLD: 0.4 %
LYMPHOCYTES # BLD AUTO: 2.1 10E9/L (ref 0.8–5.3)
LYMPHOCYTES NFR BLD AUTO: 18.1 %
MCH RBC QN AUTO: 30.7 PG (ref 26.5–33)
MCHC RBC AUTO-ENTMCNC: 32.7 G/DL (ref 31.5–36.5)
MCV RBC AUTO: 94 FL (ref 78–100)
MONOCYTES # BLD AUTO: 0.8 10E9/L (ref 0–1.3)
MONOCYTES NFR BLD AUTO: 7.3 %
NEUTROPHILS # BLD AUTO: 8 10E9/L (ref 1.6–8.3)
NEUTROPHILS NFR BLD AUTO: 71.2 %
NRBC # BLD AUTO: 0 10*3/UL
NRBC BLD AUTO-RTO: 0 /100
PLATELET # BLD AUTO: 278 10E9/L (ref 150–450)
POTASSIUM SERPL-SCNC: 4.3 MMOL/L (ref 3.4–5.3)
PROT SERPL-MCNC: 7.4 G/DL (ref 6.8–8.8)
RBC # BLD AUTO: 3.87 10E12/L (ref 3.8–5.2)
SODIUM SERPL-SCNC: 136 MMOL/L (ref 133–144)
WBC # BLD AUTO: 11.3 10E9/L (ref 4–11)

## 2020-12-08 PROCEDURE — 77066 DX MAMMO INCL CAD BI: CPT

## 2020-12-08 PROCEDURE — G0279 TOMOSYNTHESIS, MAMMO: HCPCS

## 2020-12-08 PROCEDURE — 80053 COMPREHEN METABOLIC PANEL: CPT | Performed by: INTERNAL MEDICINE

## 2020-12-08 PROCEDURE — 85025 COMPLETE CBC W/AUTO DIFF WBC: CPT | Performed by: INTERNAL MEDICINE

## 2020-12-08 NOTE — NURSING NOTE
Chief Complaint   Patient presents with     Labs Only     Labs drawn by RN in Lab via Right Arm VPT.      Teresa Arce RN

## 2020-12-14 NOTE — PROGRESS NOTES
"Radha Uribe is a 70 year old female who is being evaluated via a billable telephone visit.      The patient has been notified of following:     \"This telephone visit will be conducted via a call between you and your physician/provider. We have found that certain health care needs can be provided without the need for a physical exam.  This service lets us provide the care you need with a short phone conversation.  If a prescription is necessary we can send it directly to your pharmacy.  If lab work is needed we can place an order for that and you can then stop by our lab to have the test done at a later time.    Telephone visits are billed at different rates depending on your insurance coverage. During this emergency period, for some insurers they may be billed the same as an in-person visit.  Please reach out to your insurance provider with any questions.    If during the course of the call the physician/provider feels a telephone visit is not appropriate, you will not be charged for this service.\"    Patient has given verbal consent for Telephone visit?  Yes    What phone number would you like to be contacted at? [phone]    How would you like to obtain your AVS? Mail a copy    Phone call duration: 4 minutes    Radha Miranda CMA    I have reviewed and updated the patient's allergies and medication list. Patient was asked if they had any patient reported vital signs to present, if yes, please see documented vitals.  Patient was also asked for their current weight and height, if presented, documented in vitals.    Concerns: No concerns    Refills: Refill of Letrozole.       Radha Miranda CMA ([facility])      HISTORY OF PRESENT ILLNESS:   Radha Uribe is a 70-year-old woman who was referred to our Oncology Clinic by Dr. Crespo for evaluation of a newly diagnosed breast cancer. Radha was in her usual health until last month, when she was asked to do a mammogram by her primary care doctor, Dr. Preston. She has not been " doing mammogram for the last 20 years, and most of the time she does self breast examinations. She noticed a mass, which was not painful, in her left breast, and immediately went for a mammogram. The mammogram was done on 09/13/2013, which revealed a 2.5 x 2 x 0.8 cm mass, so she had an ultrasound-guided core needle biopsy on 09/17, which revealed infiltrating ductal carcinoma, [place] grade 3 (score 3 for tubule formation, 3 for nuclear atypia, 3 for mitosis). No angiolymphatic invasion was identified. There was a focal necrosis of invasive carcinoma identified, and no definite carcinoma in situ was identified. The greatest length of the needle biopsy at the left breast in the 2 o'clock position showed benign breast tissue with fibrocystic changes. There is a focal intraductal microcalcification identified. The tumor was 15-20% weakly to moderately positive for estrogen receptors and 5% positive for progesterone receptors. HER-2 was amplified. The ratio of HER-2/CEP17 signals were more than 5.9 by FISH analysis. The signal/nucleus was more than 19.4. She was seen by Dr. Crespo at the Breast Clinic on 09/24, who recommended to do an MRI of the breast due to density of the breast tissues, as well as referred her to the Breast Oncology Clinic for further staging workup and management of her breast cancer. MRI of the breast showed BI-RADS 6 and mass in the left breast at the 11 o'clock position that measures about 1.6 x 1.6 x 1.6 cm. The longest diameter in sagittal reconstruction was 1.9 cm. There is no evidence of axillary lymph node involvement on the MRI.   She had neoadjuvant chemotherapy , mastectomy and SNL sampling which showed pathologic CR and no disease in the LN. This was followed by radiation therapy. She received 5000 cGy in 25 fractions, completed on 5/23/2014. She continued on treatment with herceptin for 13 cycles. This was completed on 12/9/2014.     TREATMENT SUMMARY:  A. Neoadjuvant chemotherapy.   The tumor was 15-20% weakly to moderately positive for estrogen receptors and 5% positive for progesterone receptors. HER-2 was amplified.  Path CR.  B.  Lumpectomy.  Radiation.  C.  Adjuvant Herceptin to complete 1 year.  Adjuvant letrozole.  D.  Continue adjuvant letrozole to complete 10 years.        FOLLOWUP NOTE       Letrozole for 10 years. Dexa      INTERVAL HISTORY:   Patient seen today via telephone visit due to COVID-19 restrictions. Radha feels well. No new symptoms or changes to update since the last visit. She has ongoing minor aches and pains. She has no hot flashes. Energy levels are good. She has improved her diet, eating more vegetables and also walks daily for about 50 minutes. She has baseline anxiety but mood is good. She is not experiencing headaches, sob, chest pain, nausea, vomiting, diarrhea, abdominal pain, changes in bowel movement, fever or chills.      REVIEW OF SYSTEMS:   A detailed 10-point review of systems was negative  She has mitral regurgitation.  She refuses an echocardiogram.      PHYSICAL EXAMINATION:   Vitals were not taken due to telephone visit.   Limited exam.  Alert, no acute distress, responds to questions appropriately  Speaks in full sentences, no respiratory distress     LABORATORY DATA:   Labs reviewed in the chart.   CMP and CBC are unremarkable.     ASSESSMENT AND PLAN:   1.  Radha Uribe is a 70-year-old woman who was diagnosed in 2013 with a stage IIA, T2N2MX, invasive ductal carcinoma of the left breast.  Tumor was positive for estrogen receptor in 15%-20% of the cells, [place] positive in 5%, HER2 was amplified.  Breast mass measured 2.5 x 2 x 0.8 cm in size at 11-o'clock position.  She was treated with neoadjuvant Taxol and Herceptin for 12 weeks followed by dose-dense AC for 4 cycles.  She had a pathologic complete response in the lumpectomy specimen.  She completed radiation in 04/2014.  She tolerated the treatment quite well.  She began letrozole and is  tolerating it quite well and has had no hot flashes, myalgias or arthralgias.  The plan is to continue the letrozole for a total of 10 years of therapy.   2.  Yearly mammography for surveillance. She has agreed to this screening.  3.  Anemia.  She refuses evaluation and refuses upper and lower endoscopy.  I discussed with her there could be a cancer risk associated with gastrointestinal bleeding, but she does not want it evaluated.  She refused these again today.   4.  Mitral regurgitation.  I did offer an echocardiogram. I discussed my recommendations and rationale.  She refused again today.   5.  I offered that we would check a CBC at a 3-month interval and she refused it.   6.  I discussed smoking cessation with her anf I discussed my recommendations and rationale. and she refused it again.  7. I recommended a low dose chest CT to screen for lung cancer and she refused again today.   8.  I did recommend weightbearing exercises.  She did refuse a DEXA scan. I discussed my recommendations and rationale.  She will continue with calcium and vitamin D.   9.  Follow up.  Labs and follow-up in 6 months and repeat mammogram in one year.  Follow up with me by video 5-25-20 with CBC, CMP. Follow up with me December 2 by video with CBC, CMP, mammogram..     Thank you for allowing us to participate in this patient's care.  The patient was seen and evaluated by me.  I discussed the patient with the fellow and agree with the findings and plan in the note, which was edited by me.    Sincerely,     South Esparza MD  Professor  [facility]  [phone].             7:24-7:32  I spent 8 minutes with the patient more than 50% of which was in counseling and coordination of care.

## 2020-12-15 ENCOUNTER — VIRTUAL VISIT (OUTPATIENT)
Dept: ONCOLOGY | Facility: CLINIC | Age: 70
End: 2020-12-15
Attending: INTERNAL MEDICINE
Payer: COMMERCIAL

## 2020-12-15 DIAGNOSIS — Z17.0 CARCINOMA OF UPPER-INNER QUADRANT OF LEFT BREAST IN FEMALE, ESTROGEN RECEPTOR POSITIVE (H): ICD-10-CM

## 2020-12-15 DIAGNOSIS — C50.212 CARCINOMA OF UPPER-INNER QUADRANT OF LEFT BREAST IN FEMALE, ESTROGEN RECEPTOR POSITIVE (H): ICD-10-CM

## 2020-12-15 PROCEDURE — 99213 OFFICE O/P EST LOW 20 MIN: CPT | Mod: 95 | Performed by: INTERNAL MEDICINE

## 2020-12-15 PROCEDURE — 999N001193 HC VIDEO/TELEPHONE VISIT; NO CHARGE

## 2020-12-15 RX ORDER — LETROZOLE 2.5 MG/1
2.5 TABLET, FILM COATED ORAL DAILY
Qty: 90 TABLET | Refills: 3 | Status: SHIPPED | OUTPATIENT
Start: 2020-12-15 | End: 2021-12-02

## 2020-12-15 NOTE — LETTER
"    12/15/2020         RE: Radha Uribe  [address]  [facility] [address]        Dear Colleague,    Thank you for referring your patient, Radha Uribe, to the [facility]. Please see a copy of my visit note below.    Radha Uribe is a 70 year old female who is being evaluated via a billable telephone visit.      The patient has been notified of following:     \"This telephone visit will be conducted via a call between you and your physician/provider. We have found that certain health care needs can be provided without the need for a physical exam.  This service lets us provide the care you need with a short phone conversation.  If a prescription is necessary we can send it directly to your pharmacy.  If lab work is needed we can place an order for that and you can then stop by our lab to have the test done at a later time.    Telephone visits are billed at different rates depending on your insurance coverage. During this emergency period, for some insurers they may be billed the same as an in-person visit.  Please reach out to your insurance provider with any questions.    If during the course of the call the physician/provider feels a telephone visit is not appropriate, you will not be charged for this service.\"    Patient has given verbal consent for Telephone visit?  Yes    What phone number would you like to be contacted at? [phone]    How would you like to obtain your AVS? Mail a copy    Phone call duration: 4 minutes    Radha Miranda CMA    I have reviewed and updated the patient's allergies and medication list. Patient was asked if they had any patient reported vital signs to present, if yes, please see documented vitals.  Patient was also asked for their current weight and height, if presented, documented in vitals.    Concerns: No concerns    Refills: Refill of Letrozole.       Radha Miranda CMA ([facility])      HISTORY OF PRESENT ILLNESS:   Radha Uribe is a " 70-year-old woman who was referred to our Oncology Clinic by Dr. Crespo for evaluation of a newly diagnosed breast cancer. Radha was in her usual health until last month, when she was asked to do a mammogram by her primary care doctor, Dr. Preston. She has not been doing mammogram for the last 20 years, and most of the time she does self breast examinations. She noticed a mass, which was not painful, in her left breast, and immediately went for a mammogram. The mammogram was done on 09/13/2013, which revealed a 2.5 x 2 x 0.8 cm mass, so she had an ultrasound-guided core needle biopsy on 09/17, which revealed infiltrating ductal carcinoma, [place] grade 3 (score 3 for tubule formation, 3 for nuclear atypia, 3 for mitosis). No angiolymphatic invasion was identified. There was a focal necrosis of invasive carcinoma identified, and no definite carcinoma in situ was identified. The greatest length of the needle biopsy at the left breast in the 2 o'clock position showed benign breast tissue with fibrocystic changes. There is a focal intraductal microcalcification identified. The tumor was 15-20% weakly to moderately positive for estrogen receptors and 5% positive for progesterone receptors. HER-2 was amplified. The ratio of HER-2/CEP17 signals were more than 5.9 by FISH analysis. The signal/nucleus was more than 19.4. She was seen by Dr. Crespo at the Breast Clinic on 09/24, who recommended to do an MRI of the breast due to density of the breast tissues, as well as referred her to the Breast Oncology Clinic for further staging workup and management of her breast cancer. MRI of the breast showed BI-RADS 6 and mass in the left breast at the 11 o'clock position that measures about 1.6 x 1.6 x 1.6 cm. The longest diameter in sagittal reconstruction was 1.9 cm. There is no evidence of axillary lymph node involvement on the MRI.   She had neoadjuvant chemotherapy , mastectomy and SNL sampling which showed pathologic CR and no  disease in the LN. This was followed by radiation therapy. She received 5000 cGy in 25 fractions, completed on 5/23/2014. She continued on treatment with herceptin for 13 cycles. This was completed on 12/9/2014.     TREATMENT SUMMARY:  A. Neoadjuvant chemotherapy.  The tumor was 15-20% weakly to moderately positive for estrogen receptors and 5% positive for progesterone receptors. HER-2 was amplified.  Path CR.  B.  Lumpectomy.  Radiation.  C.  Adjuvant Herceptin to complete 1 year.  Adjuvant letrozole.  D.  Continue adjuvant letrozole to complete 10 years.        FOLLOWUP NOTE       Letrozole for 10 years. Dexa      INTERVAL HISTORY:   Patient seen today via telephone visit due to COVID-19 restrictions. Radha feels well. No new symptoms or changes to update since the last visit. She has ongoing minor aches and pains. She has no hot flashes. Energy levels are good. She has improved her diet, eating more vegetables and also walks daily for about 50 minutes. She has baseline anxiety but mood is good. She is not experiencing headaches, sob, chest pain, nausea, vomiting, diarrhea, abdominal pain, changes in bowel movement, fever or chills.      REVIEW OF SYSTEMS:   A detailed 10-point review of systems was negative  She has mitral regurgitation.  She refuses an echocardiogram.      PHYSICAL EXAMINATION:   Vitals were not taken due to telephone visit.   Limited exam.  Alert, no acute distress, responds to questions appropriately  Speaks in full sentences, no respiratory distress     LABORATORY DATA:   Labs reviewed in the chart.   CMP and CBC are unremarkable.     ASSESSMENT AND PLAN:   1.  Radha Uribe is a 70-year-old woman who was diagnosed in 2013 with a stage IIA, T2N2MX, invasive ductal carcinoma of the left breast.  Tumor was positive for estrogen receptor in 15%-20% of the cells, [place] positive in 5%, HER2 was amplified.  Breast mass measured 2.5 x 2 x 0.8 cm in size at 11-o'clock position.  She was treated with  neoadjuvant Taxol and Herceptin for 12 weeks followed by dose-dense AC for 4 cycles.  She had a pathologic complete response in the lumpectomy specimen.  She completed radiation in 04/2014.  She tolerated the treatment quite well.  She began letrozole and is tolerating it quite well and has had no hot flashes, myalgias or arthralgias.  The plan is to continue the letrozole for a total of 10 years of therapy.   2.  Yearly mammography for surveillance. She has agreed to this screening.  3.  Anemia.  She refuses evaluation and refuses upper and lower endoscopy.  I discussed with her there could be a cancer risk associated with gastrointestinal bleeding, but she does not want it evaluated.  She refused these again today.   4.  Mitral regurgitation.  I did offer an echocardiogram. I discussed my recommendations and rationale.  She refused again today.   5.  I offered that we would check a CBC at a 3-month interval and she refused it.   6.  I discussed smoking cessation with her anf I discussed my recommendations and rationale. and she refused it again.  7. I recommended a low dose chest CT to screen for lung cancer and she refused again today.   8.  I did recommend weightbearing exercises.  She did refuse a DEXA scan. I discussed my recommendations and rationale.  She will continue with calcium and vitamin D.   9.  Follow up.  Labs and follow-up in 6 months and repeat mammogram in one year.  Follow up with me by video 5-25-20 with CBC, CMP. Follow up with me December 2 by video with CBC, CMP, mammogram..     Thank you for allowing us to participate in this patient's care.  The patient was seen and evaluated by me.  I discussed the patient with the fellow and agree with the findings and plan in the note, which was edited by me.    Sincerely,     South Esparza MD  Professor  [facility]  [phone].             7:24-7:32  I spent 8 minutes with the patient more than 50% of which was in counseling and  coordination of care.       Again, thank you for allowing me to participate in the care of your patient.        Sincerely,        South Esparza MD

## 2021-03-11 ENCOUNTER — OFFICE VISIT (OUTPATIENT)
Dept: FAMILY MEDICINE | Facility: CLINIC | Age: 71
End: 2021-03-11
Payer: COMMERCIAL

## 2021-03-11 VITALS
SYSTOLIC BLOOD PRESSURE: 148 MMHG | WEIGHT: 118 LBS | TEMPERATURE: 97.5 F | DIASTOLIC BLOOD PRESSURE: 78 MMHG | BODY MASS INDEX: 18.96 KG/M2 | OXYGEN SATURATION: 97 % | HEIGHT: 66 IN | HEART RATE: 73 BPM

## 2021-03-11 DIAGNOSIS — E11.9 TYPE 2 DIABETES MELLITUS WITHOUT COMPLICATION, WITHOUT LONG-TERM CURRENT USE OF INSULIN (H): ICD-10-CM

## 2021-03-11 DIAGNOSIS — R01.1 HEART MURMUR: ICD-10-CM

## 2021-03-11 DIAGNOSIS — I10 ESSENTIAL HYPERTENSION, BENIGN: ICD-10-CM

## 2021-03-11 DIAGNOSIS — M81.0 OSTEOPOROSIS WITHOUT CURRENT PATHOLOGICAL FRACTURE, UNSPECIFIED OSTEOPOROSIS TYPE: ICD-10-CM

## 2021-03-11 DIAGNOSIS — Z87.891 FORMER SMOKER: ICD-10-CM

## 2021-03-11 DIAGNOSIS — I10 HYPERTENSION GOAL BP (BLOOD PRESSURE) < 140/90: ICD-10-CM

## 2021-03-11 DIAGNOSIS — E78.00 HYPERCHOLESTEROLEMIA: ICD-10-CM

## 2021-03-11 DIAGNOSIS — Z76.89 ENCOUNTER TO ESTABLISH CARE: Primary | ICD-10-CM

## 2021-03-11 LAB — HBA1C MFR BLD: 6.3 % (ref 0–5.6)

## 2021-03-11 PROCEDURE — 99215 OFFICE O/P EST HI 40 MIN: CPT | Performed by: FAMILY MEDICINE

## 2021-03-11 PROCEDURE — 80048 BASIC METABOLIC PNL TOTAL CA: CPT | Performed by: FAMILY MEDICINE

## 2021-03-11 PROCEDURE — 36415 COLL VENOUS BLD VENIPUNCTURE: CPT | Performed by: FAMILY MEDICINE

## 2021-03-11 PROCEDURE — 83036 HEMOGLOBIN GLYCOSYLATED A1C: CPT | Performed by: FAMILY MEDICINE

## 2021-03-11 RX ORDER — SIMVASTATIN 20 MG
20 TABLET ORAL DAILY
Qty: 90 TABLET | Refills: 1 | Status: SHIPPED | OUTPATIENT
Start: 2021-03-11 | End: 2021-09-22

## 2021-03-11 RX ORDER — METOPROLOL SUCCINATE 100 MG/1
100 TABLET, EXTENDED RELEASE ORAL DAILY
Qty: 90 TABLET | Refills: 1 | Status: SHIPPED | OUTPATIENT
Start: 2021-03-11 | End: 2021-09-22

## 2021-03-11 RX ORDER — LOSARTAN POTASSIUM 100 MG/1
100 TABLET ORAL DAILY
Qty: 90 TABLET | Refills: 1 | Status: SHIPPED | OUTPATIENT
Start: 2021-03-11 | End: 2021-09-22

## 2021-03-11 RX ORDER — HYDROCHLOROTHIAZIDE 12.5 MG/1
CAPSULE ORAL
Qty: 90 CAPSULE | Refills: 1 | Status: SHIPPED | OUTPATIENT
Start: 2021-03-11 | End: 2021-09-22

## 2021-03-11 ASSESSMENT — MIFFLIN-ST. JEOR: SCORE: 1071.99

## 2021-03-11 NOTE — PATIENT INSTRUCTIONS
1.  Refills of meds as discussed.    2.  Labs today - I will leave you a note on Epic.    3.  Schedule eye exam - recommend annual eye exams to check for changes related to diabetes.

## 2021-03-11 NOTE — PROGRESS NOTES
Assessment & Plan     Encounter to establish care  Medical histories reviewed.      Type 2 diabetes mellitus without complication, without long-term current use of insulin (H)  Historically well controlled.  Will check labs today.  If A1C remains < 7% then OK to continue visits q 6 months, if >8% then plan for visit in 3 months again with medication adjustments.  For now she will continue metformin (if A1C <6.5% we could consider discontinuing).  She is on a statin, ARB, and aspirin.  She has no microalbuminuria and recent labs show normal kidney function.  She declined foot exam today and plans to schedule her own eye exam.    - simvastatin (ZOCOR) 20 MG tablet; Take 1 tablet (20 mg) by mouth daily  - metFORMIN (GLUCOPHAGE) 500 MG tablet; Take 0.5 tablets (250 mg) by mouth 2 times daily (with meals)  - Hemoglobin A1c; Future    Hypercholesterolemia  Stable.  Last checked in summer 2020.  Continue simvastatin.    - simvastatin (ZOCOR) 20 MG tablet; Take 1 tablet (20 mg) by mouth daily    Hypertension goal BP (blood pressure) < 140/90  Slightly elevated today - she is quite anxious.  She will continue her current medications.  Check labs.    - metoprolol succinate ER (TOPROL-XL) 100 MG 24 hr tablet; Take 1 tablet (100 mg) by mouth daily  - losartan (COZAAR) 100 MG tablet; Take 1 tablet (100 mg) by mouth daily  - hydrochlorothiazide (MICROZIDE) 12.5 MG capsule; TAKE 1 CAPSULE BY MOUTH DAILY.  - Basic metabolic panel  (Ca, Cl, CO2, Creat, Gluc, K, Na, BUN); Future    Former smoker  She quit smoking 2 months ago.  Advised her that she meets criteria for lung cancer screening with low dose CT scan.  She declines.      Osteoporosis without current pathological fracture, unspecified osteoporosis type  Not discussed, reviewed after visit.  Reviewed DEXA from 2014 - T score -2.4 which actually correlates with osteopenia.  I do not see any treatment with bisphosphonate which could be considered based on FRAX data showing  10 year risk of hip fracture is 3.2% and major osteoporotic fracture risk is 12%.  We can discuss this further at her next office visit.      Heart murmur  She reports this is not new and on review of records I see this documented back to 2012.  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  No significant valvular pathology noted.  Most recent echo in 2014.  Given she is asymptomatic I think holding on repeating the echo at this time is reasonable.  56}     Return in about 6 months (around 9/11/2021) for Diabetes Follow-up, Routine Visit, Blood pressure Follow-up.  She will follow-up sooner if needed.  She expressed agreement and understanding with plan as above.      55 minutes spent on the date of the encounter doing chart review, history and exam, documentation and further activities as noted above    Deborah Lopez, [facility]    Gaudencio Garcia is a 71 year old who presents for the following health issues:    DORA Garcia is here to establish care after her previous PCP retired.      1.  She has a history of breast cancer, diagnosed in 2013.  She had chemo and radiation.  Currently on letrozole.  Follows with oncology (Dr Esparza) every 6 months, annual mammograms.      2.  Diabetes Follow-up - historically has been well controlled.  Diagnosed 2007.  Metformin only. Walks a lot (does not drive).        How often are you checking your blood sugar? Not at all    What concerns do you have today about your diabetes? None     Do you have any of these symptoms? (Select all that apply)  No numbness or tingling in feet.  No redness, sores or blisters on feet.  No complaints of excessive thirst.  No reports of blurry vision.  No significant changes to weight.    Have you had a diabetic eye exam in the last 12 months? No     She will be going for eye exam soon.     BP Readings from Last 2 Encounters:   03/11/21 (!) 148/78  "  08/20/20 120/82     Hemoglobin A1C (%)   Date Value   08/20/2020 6.1 (H)   10/09/2019 6.5 (H)     LDL Cholesterol Calculated (mg/dL)   Date Value   08/20/2020 96   10/09/2019 91       3.  Hypertension Follow-up       Do you check your blood pressure regularly outside of the clinic? No     Are you following a low salt diet? Yes    Are your blood pressures ever more than 140 on the top number (systolic) OR more   than 90 on the bottom number (diastolic), for example 140/90? Yes      How many servings of fruits and vegetables do you eat daily?  2-3    On average, how many sweetened beverages do you drink each day (Examples: soda, juice, sweet tea, etc.  Do NOT count diet or artificially sweetened beverages)?   1    How many days per week do you exercise enough to make your heart beat faster? 3 or less    How many minutes a day do you exercise enough to make your heart beat faster? 20 - 29    How many days per week do you miss taking your medication? 0      Review of Systems   Constitutional, cardiovascular, pulmonary, neuro, and endocrine systems are negative, except as otherwise noted.      Objective    BP (!) 148/78   Pulse 73   Temp 97.5  F (36.4  C) (Temporal)   Ht 1.676 m (5' 6\")   Wt 53.5 kg (118 lb)   LMP  (LMP Unknown)   SpO2 97%   BMI 19.05 kg/m    Body mass index is 19.05 kg/m .  Physical Exam   GENERAL: healthy, alert and no distress  EYES: Eyes grossly normal to inspection, conjunctivae and sclerae normal  RESP: lungs clear to auscultation - no rales, rhonchi or wheezes  CV: regular rate and rhythm, systolic murmur at LUSB 3/6  MS: no gross musculoskeletal defects noted, no edema  SKIN: warm and dry, no suspicious lesions or rashes  NEURO: Normal strength and tone, mentation intact and speech normal, normal gait          "

## 2021-03-12 LAB
ANION GAP SERPL CALCULATED.3IONS-SCNC: 3 MMOL/L (ref 3–14)
BUN SERPL-MCNC: 23 MG/DL (ref 7–30)
CALCIUM SERPL-MCNC: 9.2 MG/DL (ref 8.5–10.1)
CHLORIDE SERPL-SCNC: 102 MMOL/L (ref 94–109)
CO2 SERPL-SCNC: 28 MMOL/L (ref 20–32)
CREAT SERPL-MCNC: 0.87 MG/DL (ref 0.52–1.04)
GFR SERPL CREATININE-BSD FRML MDRD: 67 ML/MIN/{1.73_M2}
GLUCOSE SERPL-MCNC: 122 MG/DL (ref 70–99)
POTASSIUM SERPL-SCNC: 4.1 MMOL/L (ref 3.4–5.3)
SODIUM SERPL-SCNC: 133 MMOL/L (ref 133–144)

## 2021-05-08 ENCOUNTER — HEALTH MAINTENANCE LETTER (OUTPATIENT)
Age: 71
End: 2021-05-08

## 2021-05-25 ENCOUNTER — VIRTUAL VISIT (OUTPATIENT)
Dept: ONCOLOGY | Facility: CLINIC | Age: 71
End: 2021-05-25
Attending: INTERNAL MEDICINE
Payer: COMMERCIAL

## 2021-05-25 DIAGNOSIS — Z17.0 CARCINOMA OF UPPER-INNER QUADRANT OF LEFT BREAST IN FEMALE, ESTROGEN RECEPTOR POSITIVE (H): Primary | ICD-10-CM

## 2021-05-25 DIAGNOSIS — C50.212 CARCINOMA OF UPPER-INNER QUADRANT OF LEFT BREAST IN FEMALE, ESTROGEN RECEPTOR POSITIVE (H): Primary | ICD-10-CM

## 2021-05-25 NOTE — LETTER
5/25/2021         RE: Radha Uribe  [address]  [facility] [address]        Dear Colleague,    Thank you for referring your patient, Radha Uribe, to the [facility]. Please see a copy of my visit note below.    Patient could not be reached.  Please reschedule      Again, thank you for allowing me to participate in the care of your patient.        Sincerely,        South Esparza MD

## 2021-09-22 ENCOUNTER — OFFICE VISIT (OUTPATIENT)
Dept: FAMILY MEDICINE | Facility: CLINIC | Age: 71
End: 2021-09-22
Payer: COMMERCIAL

## 2021-09-22 ENCOUNTER — LAB (OUTPATIENT)
Dept: LAB | Facility: CLINIC | Age: 71
End: 2021-09-22

## 2021-09-22 VITALS
HEIGHT: 66 IN | HEART RATE: 81 BPM | SYSTOLIC BLOOD PRESSURE: 136 MMHG | OXYGEN SATURATION: 97 % | BODY MASS INDEX: 19.77 KG/M2 | TEMPERATURE: 97.3 F | DIASTOLIC BLOOD PRESSURE: 88 MMHG | WEIGHT: 123 LBS

## 2021-09-22 DIAGNOSIS — E78.00 HYPERCHOLESTEROLEMIA: ICD-10-CM

## 2021-09-22 DIAGNOSIS — I10 HYPERTENSION GOAL BP (BLOOD PRESSURE) < 140/90: ICD-10-CM

## 2021-09-22 DIAGNOSIS — M81.0 OSTEOPOROSIS WITHOUT CURRENT PATHOLOGICAL FRACTURE, UNSPECIFIED OSTEOPOROSIS TYPE: ICD-10-CM

## 2021-09-22 DIAGNOSIS — Z23 NEED FOR VACCINATION: ICD-10-CM

## 2021-09-22 DIAGNOSIS — E11.9 TYPE 2 DIABETES MELLITUS WITHOUT COMPLICATION, WITHOUT LONG-TERM CURRENT USE OF INSULIN (H): Primary | ICD-10-CM

## 2021-09-22 DIAGNOSIS — E11.9 TYPE 2 DIABETES MELLITUS WITHOUT COMPLICATION, WITHOUT LONG-TERM CURRENT USE OF INSULIN (H): ICD-10-CM

## 2021-09-22 LAB
ALT SERPL W P-5'-P-CCNC: 26 U/L (ref 0–50)
ANION GAP SERPL CALCULATED.3IONS-SCNC: 4 MMOL/L (ref 3–14)
AST SERPL W P-5'-P-CCNC: 22 U/L (ref 0–45)
BUN SERPL-MCNC: 28 MG/DL (ref 7–30)
CALCIUM SERPL-MCNC: 8.7 MG/DL (ref 8.5–10.1)
CHLORIDE BLD-SCNC: 101 MMOL/L (ref 94–109)
CHOLEST SERPL-MCNC: 186 MG/DL
CO2 SERPL-SCNC: 29 MMOL/L (ref 20–32)
CREAT SERPL-MCNC: 1 MG/DL (ref 0.52–1.04)
CREAT UR-MCNC: 48 MG/DL
FASTING STATUS PATIENT QL REPORTED: NO
GFR SERPL CREATININE-BSD FRML MDRD: 57 ML/MIN/1.73M2
GLUCOSE BLD-MCNC: 158 MG/DL (ref 70–99)
HBA1C MFR BLD: 7 % (ref 0–5.6)
HDLC SERPL-MCNC: 36 MG/DL
LDLC SERPL CALC-MCNC: 98 MG/DL
MICROALBUMIN UR-MCNC: <5 MG/L
MICROALBUMIN/CREAT UR: NORMAL MG/G{CREAT}
NONHDLC SERPL-MCNC: 150 MG/DL
POTASSIUM BLD-SCNC: 4.3 MMOL/L (ref 3.4–5.3)
SODIUM SERPL-SCNC: 134 MMOL/L (ref 133–144)
TRIGL SERPL-MCNC: 260 MG/DL

## 2021-09-22 PROCEDURE — 90471 IMMUNIZATION ADMIN: CPT | Performed by: FAMILY MEDICINE

## 2021-09-22 PROCEDURE — 99207 PR FOOT EXAM NO CHARGE: CPT | Performed by: FAMILY MEDICINE

## 2021-09-22 PROCEDURE — 80048 BASIC METABOLIC PNL TOTAL CA: CPT

## 2021-09-22 PROCEDURE — 83036 HEMOGLOBIN GLYCOSYLATED A1C: CPT

## 2021-09-22 PROCEDURE — 99214 OFFICE O/P EST MOD 30 MIN: CPT | Mod: 25 | Performed by: FAMILY MEDICINE

## 2021-09-22 PROCEDURE — 36415 COLL VENOUS BLD VENIPUNCTURE: CPT

## 2021-09-22 PROCEDURE — 90732 PPSV23 VACC 2 YRS+ SUBQ/IM: CPT | Performed by: FAMILY MEDICINE

## 2021-09-22 PROCEDURE — 80061 LIPID PANEL: CPT

## 2021-09-22 PROCEDURE — 84450 TRANSFERASE (AST) (SGOT): CPT

## 2021-09-22 PROCEDURE — 82043 UR ALBUMIN QUANTITATIVE: CPT

## 2021-09-22 PROCEDURE — 84460 ALANINE AMINO (ALT) (SGPT): CPT

## 2021-09-22 RX ORDER — LOSARTAN POTASSIUM 100 MG/1
100 TABLET ORAL DAILY
Qty: 90 TABLET | Refills: 1 | Status: SHIPPED | OUTPATIENT
Start: 2021-09-22 | End: 2022-05-09

## 2021-09-22 RX ORDER — SIMVASTATIN 20 MG
20 TABLET ORAL DAILY
Qty: 90 TABLET | Refills: 1 | Status: SHIPPED | OUTPATIENT
Start: 2021-09-22 | End: 2022-05-09

## 2021-09-22 RX ORDER — HYDROCHLOROTHIAZIDE 12.5 MG/1
CAPSULE ORAL
Qty: 90 CAPSULE | Refills: 1 | Status: SHIPPED | OUTPATIENT
Start: 2021-09-22 | End: 2022-05-09

## 2021-09-22 RX ORDER — METOPROLOL SUCCINATE 100 MG/1
100 TABLET, EXTENDED RELEASE ORAL DAILY
Qty: 90 TABLET | Refills: 1 | Status: SHIPPED | OUTPATIENT
Start: 2021-09-22 | End: 2022-05-09

## 2021-09-22 ASSESSMENT — MIFFLIN-ST. JEOR: SCORE: 1094.67

## 2021-09-22 NOTE — PATIENT INSTRUCTIONS
Labs today.  We will call you with lab results.      Refilled meds x 6 months.    Pneumovax (pneumonia) vaccine today.  Consider shingles vaccine as well.      Follow-up in 6 months, sooner if needed (or if labs suggest need to be seen sooner).

## 2021-09-22 NOTE — PROGRESS NOTES
Assessment & Plan     Type 2 diabetes mellitus without complication, without long-term current use of insulin (H)  Historically well controlled with some concerns A1c may be a bit higher since she has been eating more sweets.  We will check labs today.  She is on an ARB and blood pressure is at goal.  She is on a statin.  - Hemoglobin A1c; Future  - Basic metabolic panel  (Ca, Cl, CO2, Creat, Gluc, K, Na, BUN); Future  - Albumin Random Urine Quantitative with Creat Ratio; Future  - simvastatin (ZOCOR) 20 MG tablet; Take 1 tablet (20 mg) by mouth daily  - metFORMIN (GLUCOPHAGE) 500 MG tablet; Take 0.5 tablets (250 mg) by mouth 2 times daily (with meals)  - FOOT EXAM    Hypertension goal BP (blood pressure) < 140/90  At goal.  Continue current medications.  Check labs.  - Basic metabolic panel  (Ca, Cl, CO2, Creat, Gluc, K, Na, BUN); Future  - Albumin Random Urine Quantitative with Creat Ratio; Future  - hydrochlorothiazide (MICROZIDE) 12.5 MG capsule; TAKE 1 CAPSULE BY MOUTH DAILY.  - losartan (COZAAR) 100 MG tablet; Take 1 tablet (100 mg) by mouth daily  - metoprolol succinate ER (TOPROL-XL) 100 MG 24 hr tablet; Take 1 tablet (100 mg) by mouth daily    Hypercholesterolemia  She is on statin therapy.  Recheck lipids today.  - Lipid panel reflex to direct LDL Non-fasting; Future  - AST; Future  - ALT; Future  - simvastatin (ZOCOR) 20 MG tablet; Take 1 tablet (20 mg) by mouth daily    Need for vaccination  Discussed recommendation for pneumococcal vaccine and she is agreeable to this today.  - Pneumococcal vaccine 23 valent PPSV23  (Pneumovax) [02658]    Osteoporosis without current pathological fracture, unspecified osteoporosis type  We discussed diagnosis of osteoporosis and rationale for treatment (decreasing risk of major fracture).  She does not wish to pursue treatments at this time.    She will follow up in 6 months with new PCP since I will no longer be in primary care, sooner if needed or if labs suggest  need for sooner follow-up.  We discussed importance of continuing follow-up with oncology.  She expressed agreement understanding with plan as outlined above.    Deborah Lopez,   [facility] Garcia is a 71 year old who presents for the following health issues     HPI     Diabetes Follow-up      How often are you checking your blood sugar? Not at all    What concerns do you have today about your diabetes? None     Do you have any of these symptoms? (Select all that apply)  No numbness or tingling in feet.  No redness, sores or blisters on feet.  No complaints of excessive thirst.  No reports of blurry vision.  No significant changes to weight.    Have you had a diabetic eye exam in the last 12 months? No     Notes eating more sweets lately.  A bit worried A1C will be elevated.      Eye exam a few months ago.  No diabetic changes reported.      Quit smoking Parvez 3, 2021.    Interested in pneumococcal today.     Reviewed diagnosis of osteoporosis.  Not interested in osteoporosis treatment or repeating DEXA.  Taking calcium + vitamin D.       BP Readings from Last 2 Encounters:   09/22/21 136/88   03/11/21 (!) 148/78     Hemoglobin A1C (%)   Date Value   03/11/2021 6.3 (H)   08/20/2020 6.1 (H)     LDL Cholesterol Calculated (mg/dL)   Date Value   08/20/2020 96   10/09/2019 91         How many servings of fruits and vegetables do you eat daily?  0-1    On average, how many sweetened beverages do you drink each day (Examples: soda, juice, sweet tea, etc.  Do NOT count diet or artificially sweetened beverages)?   1    How many days per week do you exercise enough to make your heart beat faster? 3 or less    How many minutes a day do you exercise enough to make your heart beat faster? 10 - 19    How many days per week do you miss taking your medication? 0      Review of Systems   Constitutional, HEENT, cardiovascular, pulmonary, GI, , musculoskeletal, neuro, skin, endocrine and  "psych systems are negative, except as otherwise noted.      Objective    /88   Pulse 81   Temp 97.3  F (36.3  C) (Temporal)   Ht 1.676 m (5' 6\")   Wt 55.8 kg (123 lb)   LMP  (LMP Unknown)   SpO2 97%   BMI 19.85 kg/m    Body mass index is 19.85 kg/m .  Physical Exam   GENERAL: healthy, alert and no distress  EYES: Eyes grossly normal to inspection, conjunctivae and sclerae normal  NECK: no adenopathy, no asymmetry, thyroid normal to palpation  RESP: lungs clear to auscultation - no rales, rhonchi or wheezes  CV: regular rate and rhythm, systolic murmur (3/6), no peripheral edema and peripheral pulses strong  SKIN:warm and dry, no pallor or jaundice, no suspicious lesions or rashes  NEURO: Normal strength and tone, mentation intact and speech normal  PSYCH: mentation appears normal, affect normal/bright  Diabetic foot exam: normal DP and PT pulses, no trophic changes or ulcerative lesions and normal sensory exam              "

## 2021-11-28 NOTE — PROGRESS NOTES
Radha is a 71 year old who is being evaluated via a billable telephone visit.      What phone number would you like to be contacted at? [phone]  How would you like to obtain your AVS? Mail a copy  Phone call duration: 8 minutes    HISTORY OF PRESENT ILLNESS:   Radha Uribe is a 71-year-old woman who was referred to our Oncology Clinic by Dr. Crespo for evaluation of a newly diagnosed breast cancer. Radha was in her usual health until last month, when she was asked to do a mammogram by her primary care doctor, Dr. Preston. She has not been doing mammogram for the last 20 years, and most of the time she does self breast examinations. She noticed a mass, which was not painful, in her left breast, and immediately went for a mammogram. The mammogram was done on 09/13/2013, which revealed a 2.5 x 2 x 0.8 cm mass, so she had an ultrasound-guided core needle biopsy on 09/17, which revealed infiltrating ductal carcinoma, [place] grade 3 (score 3 for tubule formation, 3 for nuclear atypia, 3 for mitosis). No angiolymphatic invasion was identified. There was a focal necrosis of invasive carcinoma identified, and no definite carcinoma in situ was identified. The greatest length of the needle biopsy at the left breast in the 2 o'clock position showed benign breast tissue with fibrocystic changes. There is a focal intraductal microcalcification identified. The tumor was 15-20% weakly to moderately positive for estrogen receptors and 5% positive for progesterone receptors. HER-2 was amplified. The ratio of HER-2/CEP17 signals were more than 5.9 by FISH analysis. The signal/nucleus was more than 19.4. She was seen by Dr. Crespo at the Breast Clinic on 09/24, who recommended to do an MRI of the breast due to density of the breast tissues, as well as referred her to the Breast Oncology Clinic for further staging workup and management of her breast cancer. MRI of the breast showed BI-RADS 6 and mass in the left breast at the 11  o'clock position that measures about 1.6 x 1.6 x 1.6 cm. The longest diameter in sagittal reconstruction was 1.9 cm. There is no evidence of axillary lymph node involvement on the MRI.   She had neoadjuvant chemotherapy , mastectomy and SNL sampling which showed pathologic CR and no disease in the LN. This was followed by radiation therapy. She received 5000 cGy in 25 fractions, completed on 5/23/2014. She continued on treatment with herceptin for 13 cycles. This was completed on 12/9/2014.     TREATMENT SUMMARY:  A. Neoadjuvant chemotherapy.  The tumor was 15-20% weakly to moderately positive for estrogen receptors and 5% positive for progesterone receptors. HER-2 was amplified.  Path CR.  B.  Lumpectomy.  Radiation.  C.  Adjuvant Herceptin to complete 1 year.  Adjuvant letrozole.  D.  Continue adjuvant letrozole to complete 10 years thorough January, 2024.  E.  Covid vaccination x3 has been completed, flu shot completed.      FOLLOWUP NOTE       INTERVAL HISTORY:   Patient seen today via telephone visit. Radha feels well. No new symptoms or changes to update since the last visit. She has ongoing minor aches and pains. She has no hot flashes. Energy levels are good. She has improved her diet, eating more vegetables and also walks daily for about 50 minutes. She has baseline anxiety but mood is good. She is not experiencing headaches, sob at rest, cough, chest pain, nausea, vomiting, diarrhea, abdominal pain, changes in bowel movement, fever or chills. Tolerating letrozole very well.  She does have significant sleep problems and sleeps about 3 to 4 hours at a time.    She denies pain, fatigue, depression, anxiety.    She stopped smoking in January, 2021 and I commended her on her efforts.     REVIEW OF SYSTEMS:   A detailed 10-point review of systems was negative  She has mitral regurgitation.  She refuses an echocardiogram.      PHYSICAL EXAMINATION:    Vitals were not taken due to telephone visit.   Limited  exam.  Alert, no acute distress, responds to questions appropriately  Speaks in full sentences, no respiratory distress     LABORATORY DATA:   CMP was within normal limits.  Glucose 129.  CBC showed WBC of 10.4 hemoglobin 10.6 platelets 264,000.  Absolute neutrophil count 6300.  Mammogram November 30, 2021 was BI-RADS 2.     ASSESSMENT AND PLAN:   1.  Radha Uribe is a 71-year-old woman who was diagnosed in 2013 with a stage IIA, T2N2MX, invasive ductal carcinoma of the left breast.  Tumor was positive for estrogen receptor in 15%-20% of the cells, VA positive in 5%, HER2 was amplified.  Breast mass measured 2.5 x 2 x 0.8 cm in size at 11-o'clock position.  She was treated with neoadjuvant Taxol and Herceptin for 12 weeks followed by dose-dense AC for 4 cycles.  She had a pathologic complete response in the lumpectomy specimen.  She completed radiation in 04/2014.  She tolerated the treatment quite well.  She began letrozole and is tolerating it quite well and has had no hot flashes, myalgias or arthralgias.  The plan is to continue the letrozole for a total of 10 years of therapy.  She is tolerating the letrozole quite well.  2.  Yearly mammography for surveillance. She has agreed to this screening.  3.  Anemia.  She again refuses evaluation and refuses upper and lower endoscopy.  I discussed with her there could be a cancer risk associated with gastrointestinal bleeding, but she does not want it evaluated.  She refused these again today.   4.  She has refused interval follow-up CBCs.  5.  I I did commend her on her cessation of tobacco.  She said that she became concerned about the effects of tobacco and stop smoking.  Per sinuses of gotten better and although she still has some sinus problems.  Her shortness of breath is a bit improved.  6. I recommended a low dose chest CT to screen for lung cancer and she refused again today.   7.  She refused a DEXA scan to check on bone density.  8.  She did have a flu shot  and has had Covid vaccination x3.  I commended her on her vaccinations.  9.  I did recommend weightbearing exercises.  She did refuse a DEXA scan. I discussed my recommendations and rationale.  She will continue with calcium and vitamin D.   10.  Follow up.  Labs and follow-up in 6 months and repeat mammogram in one year.  Follow up with SAHARA by video 5-26-20 with JESSICA, LUIS. Follow up with me December 1 by video with CBC, CMP, mammogram.     Thank you for allowing us to participate in this patient's care.       Sincerely,      South Esparza MD  Professor  [facility]  [phone].             10:42-10:50  I spent 8 minutes with the patient more than 50% of which was in counseling and coordination of care.

## 2021-11-30 ENCOUNTER — ANCILLARY PROCEDURE (OUTPATIENT)
Dept: MAMMOGRAPHY | Facility: CLINIC | Age: 71
End: 2021-11-30
Attending: INTERNAL MEDICINE
Payer: COMMERCIAL

## 2021-11-30 ENCOUNTER — LAB (OUTPATIENT)
Dept: LAB | Facility: CLINIC | Age: 71
End: 2021-11-30
Attending: INTERNAL MEDICINE
Payer: COMMERCIAL

## 2021-11-30 VITALS
BODY MASS INDEX: 19.8 KG/M2 | HEART RATE: 71 BPM | WEIGHT: 122.7 LBS | OXYGEN SATURATION: 97 % | SYSTOLIC BLOOD PRESSURE: 162 MMHG | DIASTOLIC BLOOD PRESSURE: 79 MMHG | TEMPERATURE: 97.4 F | RESPIRATION RATE: 16 BRPM

## 2021-11-30 DIAGNOSIS — Z17.0 CARCINOMA OF UPPER-INNER QUADRANT OF LEFT BREAST IN FEMALE, ESTROGEN RECEPTOR POSITIVE (H): ICD-10-CM

## 2021-11-30 DIAGNOSIS — Z85.3 HX OF BREAST CANCER: ICD-10-CM

## 2021-11-30 DIAGNOSIS — C50.212 CARCINOMA OF UPPER-INNER QUADRANT OF LEFT BREAST IN FEMALE, ESTROGEN RECEPTOR POSITIVE (H): ICD-10-CM

## 2021-11-30 LAB
ALBUMIN SERPL-MCNC: 3.8 G/DL (ref 3.4–5)
ALP SERPL-CCNC: 83 U/L (ref 40–150)
ALT SERPL W P-5'-P-CCNC: 29 U/L (ref 0–50)
ANION GAP SERPL CALCULATED.3IONS-SCNC: 8 MMOL/L (ref 3–14)
AST SERPL W P-5'-P-CCNC: 17 U/L (ref 0–45)
BASOPHILS # BLD AUTO: 0.1 10E3/UL (ref 0–0.2)
BASOPHILS NFR BLD AUTO: 1 %
BILIRUB SERPL-MCNC: 0.2 MG/DL (ref 0.2–1.3)
BUN SERPL-MCNC: 28 MG/DL (ref 7–30)
CALCIUM SERPL-MCNC: 9.8 MG/DL (ref 8.5–10.1)
CHLORIDE BLD-SCNC: 102 MMOL/L (ref 94–109)
CO2 SERPL-SCNC: 28 MMOL/L (ref 20–32)
CREAT SERPL-MCNC: 0.94 MG/DL (ref 0.52–1.04)
EOSINOPHIL # BLD AUTO: 0.4 10E3/UL (ref 0–0.7)
EOSINOPHIL NFR BLD AUTO: 4 %
ERYTHROCYTE [DISTWIDTH] IN BLOOD BY AUTOMATED COUNT: 12.8 % (ref 10–15)
GFR SERPL CREATININE-BSD FRML MDRD: 62 ML/MIN/1.73M2
GLUCOSE BLD-MCNC: 129 MG/DL (ref 70–99)
HCT VFR BLD AUTO: 33.1 % (ref 35–47)
HGB BLD-MCNC: 10.6 G/DL (ref 11.7–15.7)
IMM GRANULOCYTES # BLD: 0.1 10E3/UL
IMM GRANULOCYTES NFR BLD: 1 %
LYMPHOCYTES # BLD AUTO: 2.7 10E3/UL (ref 0.8–5.3)
LYMPHOCYTES NFR BLD AUTO: 26 %
MCH RBC QN AUTO: 30 PG (ref 26.5–33)
MCHC RBC AUTO-ENTMCNC: 32 G/DL (ref 31.5–36.5)
MCV RBC AUTO: 94 FL (ref 78–100)
MONOCYTES # BLD AUTO: 0.8 10E3/UL (ref 0–1.3)
MONOCYTES NFR BLD AUTO: 8 %
NEUTROPHILS # BLD AUTO: 6.3 10E3/UL (ref 1.6–8.3)
NEUTROPHILS NFR BLD AUTO: 60 %
NRBC # BLD AUTO: 0 10E3/UL
NRBC BLD AUTO-RTO: 0 /100
PLATELET # BLD AUTO: 264 10E3/UL (ref 150–450)
POTASSIUM BLD-SCNC: 4.2 MMOL/L (ref 3.4–5.3)
PROT SERPL-MCNC: 7.3 G/DL (ref 6.8–8.8)
RBC # BLD AUTO: 3.53 10E6/UL (ref 3.8–5.2)
SODIUM SERPL-SCNC: 138 MMOL/L (ref 133–144)
WBC # BLD AUTO: 10.4 10E3/UL (ref 4–11)

## 2021-11-30 PROCEDURE — G0279 TOMOSYNTHESIS, MAMMO: HCPCS | Performed by: STUDENT IN AN ORGANIZED HEALTH CARE EDUCATION/TRAINING PROGRAM

## 2021-11-30 PROCEDURE — 80053 COMPREHEN METABOLIC PANEL: CPT

## 2021-11-30 PROCEDURE — 77066 DX MAMMO INCL CAD BI: CPT | Performed by: STUDENT IN AN ORGANIZED HEALTH CARE EDUCATION/TRAINING PROGRAM

## 2021-11-30 PROCEDURE — 85025 COMPLETE CBC W/AUTO DIFF WBC: CPT

## 2021-11-30 PROCEDURE — 36415 COLL VENOUS BLD VENIPUNCTURE: CPT

## 2021-11-30 ASSESSMENT — PAIN SCALES - GENERAL: PAINLEVEL: NO PAIN (0)

## 2021-11-30 NOTE — NURSING NOTE
Chief Complaint   Patient presents with     Blood Draw     Labs drawn via  by rn in lab. VS taken.     Labs collected from venipuncture by RN. Vitals taken.    Adriano Hodges RN

## 2021-12-02 ENCOUNTER — VIRTUAL VISIT (OUTPATIENT)
Dept: ONCOLOGY | Facility: CLINIC | Age: 71
End: 2021-12-02
Attending: INTERNAL MEDICINE
Payer: COMMERCIAL

## 2021-12-02 DIAGNOSIS — C50.212 CARCINOMA OF UPPER-INNER QUADRANT OF LEFT BREAST IN FEMALE, ESTROGEN RECEPTOR POSITIVE (H): Primary | ICD-10-CM

## 2021-12-02 DIAGNOSIS — Z17.0 CARCINOMA OF UPPER-INNER QUADRANT OF LEFT BREAST IN FEMALE, ESTROGEN RECEPTOR POSITIVE (H): Primary | ICD-10-CM

## 2021-12-02 PROCEDURE — 999N001193 HC VIDEO/TELEPHONE VISIT; NO CHARGE

## 2021-12-02 PROCEDURE — 99213 OFFICE O/P EST LOW 20 MIN: CPT | Mod: 95 | Performed by: INTERNAL MEDICINE

## 2021-12-02 RX ORDER — LETROZOLE 2.5 MG/1
2.5 TABLET, FILM COATED ORAL DAILY
Qty: 90 TABLET | Refills: 3 | Status: SHIPPED | OUTPATIENT
Start: 2021-12-02 | End: 2023-12-14

## 2021-12-02 NOTE — LETTER
12/2/2021         RE: Radha Uribe  [address]  [place] [facility] [place] [address]        Dear Colleague,    Thank you for referring your patient, Radha Uribe, to the [facility]. Please see a copy of my visit note below.    HISTORY OF PRESENT ILLNESS:   Radha Uribe is a 71-year-old woman who was referred to our Oncology Clinic by Dr. Crespo for evaluation of a newly diagnosed breast cancer. Radha was in her usual health until last month, when she was asked to do a mammogram by her primary care doctor, Dr. Preston. She has not been doing mammogram for the last 20 years, and most of the time she does self breast examinations. She noticed a mass, which was not painful, in her left breast, and immediately went for a mammogram. The mammogram was done on 09/13/2013, which revealed a 2.5 x 2 x 0.8 cm mass, so she had an ultrasound-guided core needle biopsy on 09/17, which revealed infiltrating ductal carcinoma, [place] grade 3 (score 3 for tubule formation, 3 for nuclear atypia, 3 for mitosis). No angiolymphatic invasion was identified. There was a focal necrosis of invasive carcinoma identified, and no definite carcinoma in situ was identified. The greatest length of the needle biopsy at the left breast in the 2 o'clock position showed benign breast tissue with fibrocystic changes. There is a focal intraductal microcalcification identified. The tumor was 15-20% weakly to moderately positive for estrogen receptors and 5% positive for progesterone receptors. HER-2 was amplified. The ratio of HER-2/CEP17 signals were more than 5.9 by FISH analysis. The signal/nucleus was more than 19.4. She was seen by Dr. Crespo at the Breast Clinic on 09/24, who recommended to do an MRI of the breast due to density of the breast tissues, as well as referred her to the Breast Oncology Clinic for further staging workup and management of her breast cancer. MRI of the breast showed BI-RADS 6 and mass in the  left breast at the 11 o'clock position that measures about 1.6 x 1.6 x 1.6 cm. The longest diameter in sagittal reconstruction was 1.9 cm. There is no evidence of axillary lymph node involvement on the MRI.   She had neoadjuvant chemotherapy , mastectomy and SNL sampling which showed pathologic CR and no disease in the LN. This was followed by radiation therapy. She received 5000 cGy in 25 fractions, completed on 5/23/2014. She continued on treatment with herceptin for 13 cycles. This was completed on 12/9/2014.     TREATMENT SUMMARY:  A. Neoadjuvant chemotherapy.  The tumor was 15-20% weakly to moderately positive for estrogen receptors and 5% positive for progesterone receptors. HER-2 was amplified.  Path CR.  B.  Lumpectomy.  Radiation.  C.  Adjuvant Herceptin to complete 1 year.  Adjuvant letrozole.  D.  Continue adjuvant letrozole to complete 10 years thorough January, 2024.  E.  Covid vaccination x3 has been completed, flu shot completed.      FOLLOWUP NOTE       INTERVAL HISTORY:   Patient seen today via telephone visit. Radha feels well. No new symptoms or changes to update since the last visit. She has ongoing minor aches and pains. She has no hot flashes. Energy levels are good. She has improved her diet, eating more vegetables and also walks daily for about 50 minutes. She has baseline anxiety but mood is good. She is not experiencing headaches, sob at rest, cough, chest pain, nausea, vomiting, diarrhea, abdominal pain, changes in bowel movement, fever or chills. Tolerating letrozole very well.  She does have significant sleep problems and sleeps about 3 to 4 hours at a time.    She denies pain, fatigue, depression, anxiety.    She stopped smoking in January, 2021 and I commended her on her efforts.     REVIEW OF SYSTEMS:   A detailed 10-point review of systems was negative  She has mitral regurgitation.  She refuses an echocardiogram.      PHYSICAL EXAMINATION:    Vitals were not taken due to telephone  visit.   Limited exam.  Alert, no acute distress, responds to questions appropriately  Speaks in full sentences, no respiratory distress     LABORATORY DATA:   CMP was within normal limits.  Glucose 129.  CBC showed WBC of 10.4 hemoglobin 10.6 platelets 264,000.  Absolute neutrophil count 6300.  Mammogram November 30, 2021 was BI-RADS 2.     ASSESSMENT AND PLAN:   1.  Radha Uribe is a 71-year-old woman who was diagnosed in 2013 with a stage IIA, T2N2MX, invasive ductal carcinoma of the left breast.  Tumor was positive for estrogen receptor in 15%-20% of the cells, [place] positive in 5%, HER2 was amplified.  Breast mass measured 2.5 x 2 x 0.8 cm in size at 11-o'clock position.  She was treated with neoadjuvant Taxol and Herceptin for 12 weeks followed by dose-dense AC for 4 cycles.  She had a pathologic complete response in the lumpectomy specimen.  She completed radiation in 04/2014.  She tolerated the treatment quite well.  She began letrozole and is tolerating it quite well and has had no hot flashes, myalgias or arthralgias.  The plan is to continue the letrozole for a total of 10 years of therapy.  She is tolerating the letrozole quite well.  2.  Yearly mammography for surveillance. She has agreed to this screening.  3.  Anemia.  She again refuses evaluation and refuses upper and lower endoscopy.  I discussed with her there could be a cancer risk associated with gastrointestinal bleeding, but she does not want it evaluated.  She refused these again today.   4.  She has refused interval follow-up CBCs.  5.  I I did commend her on her cessation of tobacco.  She said that she became concerned about the effects of tobacco and stop smoking.  Per sinuses of gotten better and although she still has some sinus problems.  Her shortness of breath is a bit improved.  6. I recommended a low dose chest CT to screen for lung cancer and she refused again today.   7.  She refused a DEXA scan to check on bone density.  8.  She did  have a flu shot and has had Covid vaccination x3.  I commended her on her vaccinations.  9.  I did recommend weightbearing exercises.  She did refuse a DEXA scan. I discussed my recommendations and rationale.  She will continue with calcium and vitamin D.   10.  Follow up.  Labs and follow-up in 6 months and repeat mammogram in one year.  Follow up with SAHARA by video 5-26-20 with JESSICA, LUIS. Follow up with me December 1 by video with CBC, CMP, mammogram.     Thank you for allowing us to participate in this patient's care.       Sincerely,      South Esparza MD  Professor  [facility]  [phone].             10:42-10:50  I spent 8 minutes with the patient more than 50% of which was in counseling and coordination of care.

## 2022-04-09 ENCOUNTER — HEALTH MAINTENANCE LETTER (OUTPATIENT)
Age: 72
End: 2022-04-09

## 2022-05-09 ENCOUNTER — OFFICE VISIT (OUTPATIENT)
Dept: FAMILY MEDICINE | Facility: CLINIC | Age: 72
End: 2022-05-09

## 2022-05-09 ENCOUNTER — LAB (OUTPATIENT)
Dept: LAB | Facility: CLINIC | Age: 72
End: 2022-05-09
Payer: COMMERCIAL

## 2022-05-09 VITALS
TEMPERATURE: 98.2 F | RESPIRATION RATE: 16 BRPM | BODY MASS INDEX: 19.08 KG/M2 | HEART RATE: 80 BPM | DIASTOLIC BLOOD PRESSURE: 75 MMHG | WEIGHT: 118.2 LBS | OXYGEN SATURATION: 98 % | SYSTOLIC BLOOD PRESSURE: 135 MMHG

## 2022-05-09 DIAGNOSIS — E11.9 TYPE 2 DIABETES MELLITUS WITHOUT COMPLICATION, WITHOUT LONG-TERM CURRENT USE OF INSULIN (H): ICD-10-CM

## 2022-05-09 DIAGNOSIS — I10 HYPERTENSION GOAL BP (BLOOD PRESSURE) < 140/90: ICD-10-CM

## 2022-05-09 DIAGNOSIS — Z17.0 CARCINOMA OF UPPER-INNER QUADRANT OF LEFT BREAST IN FEMALE, ESTROGEN RECEPTOR POSITIVE (H): ICD-10-CM

## 2022-05-09 DIAGNOSIS — E78.00 HYPERCHOLESTEROLEMIA: ICD-10-CM

## 2022-05-09 DIAGNOSIS — E11.9 TYPE 2 DIABETES MELLITUS WITHOUT COMPLICATION, WITHOUT LONG-TERM CURRENT USE OF INSULIN (H): Primary | ICD-10-CM

## 2022-05-09 DIAGNOSIS — C50.212 CARCINOMA OF UPPER-INNER QUADRANT OF LEFT BREAST IN FEMALE, ESTROGEN RECEPTOR POSITIVE (H): ICD-10-CM

## 2022-05-09 LAB
CREAT UR-MCNC: 84 MG/DL
HBA1C MFR BLD: 5.3 % (ref 0–5.6)
MICROALBUMIN UR-MCNC: 6 MG/L
MICROALBUMIN/CREAT UR: 7.14 MG/G CR (ref 0–25)

## 2022-05-09 PROCEDURE — 99214 OFFICE O/P EST MOD 30 MIN: CPT | Performed by: NURSE PRACTITIONER

## 2022-05-09 PROCEDURE — 83036 HEMOGLOBIN GLYCOSYLATED A1C: CPT

## 2022-05-09 PROCEDURE — 82043 UR ALBUMIN QUANTITATIVE: CPT | Performed by: NURSE PRACTITIONER

## 2022-05-09 PROCEDURE — 36415 COLL VENOUS BLD VENIPUNCTURE: CPT

## 2022-05-09 RX ORDER — METOPROLOL SUCCINATE 100 MG/1
100 TABLET, EXTENDED RELEASE ORAL DAILY
Qty: 90 TABLET | Refills: 1 | Status: SHIPPED | OUTPATIENT
Start: 2022-05-09 | End: 2023-08-03

## 2022-05-09 RX ORDER — HYDROCHLOROTHIAZIDE 12.5 MG/1
CAPSULE ORAL
Qty: 90 CAPSULE | Refills: 1 | Status: SHIPPED | OUTPATIENT
Start: 2022-05-09 | End: 2023-08-03

## 2022-05-09 RX ORDER — LOSARTAN POTASSIUM 100 MG/1
100 TABLET ORAL DAILY
Qty: 90 TABLET | Refills: 1 | Status: SHIPPED | OUTPATIENT
Start: 2022-05-09 | End: 2023-08-03

## 2022-05-09 RX ORDER — SIMVASTATIN 20 MG
20 TABLET ORAL DAILY
Qty: 90 TABLET | Refills: 1 | Status: SHIPPED | OUTPATIENT
Start: 2022-05-09 | End: 2023-08-03

## 2022-05-09 NOTE — PROGRESS NOTES
Assessment & Plan   Problem List Items Addressed This Visit     Hypercholesterolemia    Relevant Medications    simvastatin (ZOCOR) 20 MG tablet    Other Relevant Orders    Lipid panel reflex to direct LDL Fasting    Carcinoma of upper-inner quadrant of left female breast (H)      Other Visit Diagnoses     Type 2 diabetes mellitus without complication, without long-term current use of insulin (H)    -  Primary    Relevant Medications    metFORMIN (GLUCOPHAGE) 500 MG tablet    simvastatin (ZOCOR) 20 MG tablet    Other Relevant Orders    **A1C FUTURE 3mo (Completed)    Hypertension goal BP (blood pressure) < 140/90        Relevant Medications    hydrochlorothiazide (MICROZIDE) 12.5 MG capsule    losartan (COZAAR) 100 MG tablet    metoprolol succinate ER (TOPROL XL) 100 MG 24 hr tablet    Other Relevant Orders    Albumin Random Urine Quantitative with Creat Ratio (Completed)         -She previously had her cholesterol checked when she was not fasting; we discussed re-doing this as fasting to get a more accurate reading.  -Type 2 DM very well controlled; reviewed with patient that we can trial going off the metformin for a few months if she would like and then recheck HGb A!C. Discussed diet modifications to increase protein and fiber.  -HTN stable  -follows with oncology for aftercare of Breast Cancer  -She declines immunizations today         See Patient Instructions    No follow-ups on file.    NATHAN Millan CNP [facility]    Gaudencio Garcia is a 72 year old who presents for the following health issues   History of Present Illness       Diabetes:   She presents for follow up of diabetes.  She is not checking blood glucose. She has no concerns regarding her diabetes at this time.  She is not experiencing numbness or burning in feet, excessive thirst, blurry vision, weight changes or redness, sores or blisters on feet. The patient has not had a diabetic eye exam in the last 12  months.         Hypertension: She presents for follow up of hypertension.  She does not check blood pressure  regularly outside of the clinic. Outside blood pressures have been over 140/90. She follows a low salt diet.     She eats 2-3 servings of fruits and vegetables daily.She consumes 0 sweetened beverage(s) daily.She exercises with enough effort to increase her heart rate 20 to 29 minutes per day.    She is taking medications regularly.           BP Readings from Last 2 Encounters:   05/09/22 135/75   11/30/21 (!) 162/79     Hemoglobin A1C POCT (%)   Date Value   03/11/2021 6.3 (H)   08/20/2020 6.1 (H)     Hemoglobin A1C (%)   Date Value   05/09/2022 5.3   09/22/2021 7.0 (H)     LDL Cholesterol Calculated (mg/dL)   Date Value   09/22/2021 98   08/20/2020 96   10/09/2019 91     Review of Systems   Constitutional, HEENT, cardiovascular, pulmonary, gi and gu systems are negative, except as otherwise noted.      Objective    /75   Pulse 80   Temp 98.2  F (36.8  C)   Resp 16   Wt 53.6 kg (118 lb 3.2 oz)   LMP  (LMP Unknown)   SpO2 98%   BMI 19.08 kg/m    Body mass index is 19.08 kg/m .  Physical Exam   GENERAL: healthy, alert and no distress  NECK: no adenopathy, no asymmetry, masses, or scars and thyroid normal to palpation  RESP: lungs clear to auscultation - no rales, rhonchi or wheezes  CV: regular rate and rhythm, normal S1 S2, no S3 or S4, no murmur, click or rub, no peripheral edema and peripheral pulses strong  ABDOMEN: soft, nontender, no hepatosplenomegaly, no masses and bowel sounds normal  MS: no gross musculoskeletal defects noted, no edema  SKIN: no suspicious lesions or rashes  PSYCH: mentation appears normal, affect normal/bright

## 2022-06-04 ENCOUNTER — HEALTH MAINTENANCE LETTER (OUTPATIENT)
Age: 72
End: 2022-06-04

## 2022-11-26 ENCOUNTER — HEALTH MAINTENANCE LETTER (OUTPATIENT)
Age: 72
End: 2022-11-26

## 2022-12-01 ENCOUNTER — ANCILLARY PROCEDURE (OUTPATIENT)
Dept: MAMMOGRAPHY | Facility: CLINIC | Age: 72
End: 2022-12-01
Attending: INTERNAL MEDICINE
Payer: COMMERCIAL

## 2022-12-01 ENCOUNTER — LAB (OUTPATIENT)
Dept: LAB | Facility: CLINIC | Age: 72
End: 2022-12-01
Attending: INTERNAL MEDICINE
Payer: COMMERCIAL

## 2022-12-01 DIAGNOSIS — Z17.0 CARCINOMA OF UPPER-INNER QUADRANT OF LEFT BREAST IN FEMALE, ESTROGEN RECEPTOR POSITIVE (H): ICD-10-CM

## 2022-12-01 DIAGNOSIS — C50.212 CARCINOMA OF UPPER-INNER QUADRANT OF LEFT BREAST IN FEMALE, ESTROGEN RECEPTOR POSITIVE (H): ICD-10-CM

## 2022-12-01 LAB
ALBUMIN SERPL BCG-MCNC: 4.3 G/DL (ref 3.5–5.2)
ALP SERPL-CCNC: 71 U/L (ref 35–104)
ALT SERPL W P-5'-P-CCNC: 20 U/L (ref 10–35)
ANION GAP SERPL CALCULATED.3IONS-SCNC: 9 MMOL/L (ref 7–15)
AST SERPL W P-5'-P-CCNC: 19 U/L (ref 10–35)
BASOPHILS # BLD AUTO: 0.1 10E3/UL (ref 0–0.2)
BASOPHILS NFR BLD AUTO: 1 %
BILIRUB SERPL-MCNC: 0.4 MG/DL
BUN SERPL-MCNC: 19.8 MG/DL (ref 8–23)
CALCIUM SERPL-MCNC: 9.9 MG/DL (ref 8.8–10.2)
CHLORIDE SERPL-SCNC: 100 MMOL/L (ref 98–107)
CREAT SERPL-MCNC: 0.98 MG/DL (ref 0.51–0.95)
DEPRECATED HCO3 PLAS-SCNC: 27 MMOL/L (ref 22–29)
EOSINOPHIL # BLD AUTO: 0.3 10E3/UL (ref 0–0.7)
EOSINOPHIL NFR BLD AUTO: 4 %
ERYTHROCYTE [DISTWIDTH] IN BLOOD BY AUTOMATED COUNT: 12.1 % (ref 10–15)
GFR SERPL CREATININE-BSD FRML MDRD: 61 ML/MIN/1.73M2
GLUCOSE SERPL-MCNC: 158 MG/DL (ref 70–99)
HCT VFR BLD AUTO: 34.8 % (ref 35–47)
HGB BLD-MCNC: 11.6 G/DL (ref 11.7–15.7)
IMM GRANULOCYTES # BLD: 0 10E3/UL
IMM GRANULOCYTES NFR BLD: 0 %
LYMPHOCYTES # BLD AUTO: 2.2 10E3/UL (ref 0.8–5.3)
LYMPHOCYTES NFR BLD AUTO: 24 %
MCH RBC QN AUTO: 30.4 PG (ref 26.5–33)
MCHC RBC AUTO-ENTMCNC: 33.3 G/DL (ref 31.5–36.5)
MCV RBC AUTO: 91 FL (ref 78–100)
MONOCYTES # BLD AUTO: 0.7 10E3/UL (ref 0–1.3)
MONOCYTES NFR BLD AUTO: 8 %
NEUTROPHILS # BLD AUTO: 5.7 10E3/UL (ref 1.6–8.3)
NEUTROPHILS NFR BLD AUTO: 63 %
NRBC # BLD AUTO: 0 10E3/UL
NRBC BLD AUTO-RTO: 0 /100
PLATELET # BLD AUTO: 236 10E3/UL (ref 150–450)
POTASSIUM SERPL-SCNC: 4.6 MMOL/L (ref 3.4–5.3)
PROT SERPL-MCNC: 7 G/DL (ref 6.4–8.3)
RBC # BLD AUTO: 3.81 10E6/UL (ref 3.8–5.2)
SODIUM SERPL-SCNC: 136 MMOL/L (ref 136–145)
WBC # BLD AUTO: 9.1 10E3/UL (ref 4–11)

## 2022-12-01 PROCEDURE — G0279 TOMOSYNTHESIS, MAMMO: HCPCS | Performed by: RADIOLOGY

## 2022-12-01 PROCEDURE — 36415 COLL VENOUS BLD VENIPUNCTURE: CPT

## 2022-12-01 PROCEDURE — 80053 COMPREHEN METABOLIC PANEL: CPT

## 2022-12-01 PROCEDURE — 77066 DX MAMMO INCL CAD BI: CPT | Performed by: RADIOLOGY

## 2022-12-01 PROCEDURE — 85025 COMPLETE CBC W/AUTO DIFF WBC: CPT

## 2022-12-01 NOTE — NURSING NOTE
Chief Complaint   Patient presents with     Labs Only     Labs drawn via  by RN in lab.        Labs collected from venipuncture by RN.     Eliza Ventura RN

## 2022-12-15 ENCOUNTER — VIRTUAL VISIT (OUTPATIENT)
Dept: ONCOLOGY | Facility: CLINIC | Age: 72
End: 2022-12-15
Attending: INTERNAL MEDICINE
Payer: COMMERCIAL

## 2022-12-15 DIAGNOSIS — Z78.0 ASYMPTOMATIC POSTMENOPAUSAL STATUS: Primary | ICD-10-CM

## 2022-12-15 PROCEDURE — 99213 OFFICE O/P EST LOW 20 MIN: CPT | Mod: TEL | Performed by: INTERNAL MEDICINE

## 2022-12-15 NOTE — LETTER
12/15/2022         RE: Radha Uribe  [address]  [facility] [address]        Dear Colleague,    Thank you for referring your patient, Radha Uribe, to the [facility]. Please see a copy of my visit note below.    Radha is a 72 year old who is being evaluated via a billable telephone visit.      What phone number would you like to be contacted at? [phone]   How would you like to obtain your AVS? MyChart    Distant Location (provider location):  [facility]  Phone call duration: 7 minutes    Siddharth Campos    HISTORY OF PRESENT ILLNESS:   Radha Uribe is a 72-year-old woman who was referred to our Oncology Clinic by Dr. Crespo for evaluation of a newly diagnosed breast cancer. Radha was in her usual health until last month, when she was asked to do a mammogram by her primary care doctor, Dr. Preston. She has not been doing mammogram for the last 20 years, and most of the time she does self breast examinations. She noticed a mass, which was not painful, in her left breast, and immediately went for a mammogram. The mammogram was done on 09/13/2013, which revealed a 2.5 x 2 x 0.8 cm mass, so she had an ultrasound-guided core needle biopsy on 09/17, which revealed infiltrating ductal carcinoma, Daylin grade 3 (score 3 for tubule formation, 3 for nuclear atypia, 3 for mitosis). No angiolymphatic invasion was identified. There was a focal necrosis of invasive carcinoma identified, and no definite carcinoma in situ was identified. The greatest length of the needle biopsy at the left breast in the 2 o'clock position showed benign breast tissue with fibrocystic changes. There is a focal intraductal microcalcification identified. The tumor was 15-20% weakly to moderately positive for estrogen receptors and 5% positive for progesterone receptors. HER-2 was amplified. The ratio of HER-2/CEP17 signals were more than 5.9 by FISH analysis. The signal/nucleus was more than  19.4. She was seen by Dr. Crespo at the Breast Clinic on 09/24/13, who recommended to do an MRI of the breast due to density of the breast tissues, as well as referred her to the Breast Oncology Clinic for further staging workup and management of her breast cancer. MRI of the breast showed BI-RADS 6 and mass in the left breast at the 11 o'clock position that measures about 1.6 x 1.6 x 1.6 cm. The longest diameter in sagittal reconstruction was 1.9 cm. There is no evidence of axillary lymph node involvement on the MRI.   She had neoadjuvant chemotherapy , mastectomy and SNL sampling which showed pathologic CR and no disease in the LN. This was followed by radiation therapy. She received 5000 cGy in 25 fractions, completed on 5/23/2014. She continued on treatment with herceptin for 13 cycles. This was completed on 12/9/2014.     TREATMENT SUMMARY:  A. Diagnosis in 2013.  The tumor was 15-20% weakly to moderately positive for estrogen receptors and 5% positive for progesterone receptors. HER-2 was amplified.  Path CR.  Neoadjuvant chemotherapy.    B.  Lumpectomy.  Radiation.  C.  Adjuvant Herceptin to complete 1 year.  Adjuvant letrozole.  D.  Continue adjuvant letrozole to complete 10 years thorough January, 2024.  E.  Covid vaccination x3 has been completed, flu shot completed.      FOLLOWUP NOTE       INTERVAL HISTORY:   Patient seen today via telephone visit.    I saw Radha Uribe by phone visit per her request.  She has no complaints today.  She says she is feeling great.  She has no pain, fatigue or depression.  She does have chronic anxiety.  She continues on letrozole and is tolerating it well with no joint stiffness, hot flashes or vaginal dryness.  She had a mammogram 12/01/2022, which was BI-RADS 2.  She quit smoking approximately 2 years ago, and I commended her on that.  She continues on Cozaar for hypertension.  She was on metformin for diabetes, but she stopped it because her hemoglobin A1c is now down to  5.9 with an improved diet.    I discussed with her that we will continue the aromatase inhibitor through the end of 2023.    She is working full time and plans to do so until she is 76.    REVIEW OF SYSTEMS:  A 10 point review of systems was negative.    She is eating a healthful diet.  She exercises, but perhaps not quite at 150 minutes a week.  She is doing some walking.  She does take vitamin D 2000 international units daily as well as calcium daily.  She refused a DEXA scan.  Last DEXA scan was in 2014.  She also has refused adjuvant Zometa, which was offered to her today, but she said she would take the information sheets on that.  She refused a screening CT scan for lung cancer screening.     PHYSICAL EXAMINATION:    Vitals were not taken due to telephone visit.   No exam.  Alert, no acute distress, responds to questions appropriately  Speaks in full sentences, no respiratory distress  She had a normal mood.     LABORATORY DATA:   CBC normal except for glucose of 158. CBC shows Hb of 11.6     ASSESSMENT AND PLAN:   1.  Radha Uribe is a 72-year-old woman who was diagnosed in 2013 with a stage IIA, T2N2MX, invasive ductal carcinoma of the left breast.  Tumor was positive for estrogen receptor in 15%-20% of the cells, CA positive in 5%, HER2 was amplified.  Breast mass measured 2.5 x 2 x 0.8 cm in size at 11-o'clock position.  She was treated with neoadjuvant Taxol and Herceptin for 12 weeks followed by dose-dense AC for 4 cycles.  She had a pathologic complete response in the lumpectomy specimen.  She completed radiation in 04/2014.  She tolerated the treatment quite well.  She began letrozole and is tolerating it quite well and has had no hot flashes, myalgias or arthralgias.  The plan is to continue the letrozole for a total of 10 years of therapy.  She is tolerating the letrozole quite well.  2.  Radha Uribe is now 9 years out from her primary diagnosis with ER-positive, CA-positive, HER-2 amplified  invasive carcinoma of the left breast.  She has no signs or symptoms of recurrence, but did not want to come in for a breast exam.  We will see if she would be willing to come in for a breast exam.  3.  I did discuss adjuvant Zometa with her. Adjuvant Zometa was not standard of care when she was diagnosed with her breast cancer, but the 2017 [place] Overview of meta-analysis suggests a potential benefit for postmenopausal women.  She has not followed up with her DEXA scan, but has at the very least low bone density and has been on aromatase inhibitor for 9 years.  I strongly recommended adjuvant Zometa.  She said she will take the information sheets and think about it.  4.  Lung cancer screening.  She has an extensive smoking history of more than 20 pack years.  I advised her to have a screening CT scan, and she refused again.I recommended a low dose chest CT to screen for lung cancer and she refused again today.   5.  Type 2 diabetes.  She is off metformin now with good glucose control and is following up with her primary care provider.  She did not remember the name of her primary care provider today in the conversation.  6.  Medical records.  We will see if she can agree to Care Everywhere so we can get her pathology report from [facility].  7.  Anemia.  She again refuses evaluation and refuses upper and lower endoscopy.  I discussed with her there could be a cancer risk associated with gastrointestinal bleeding, but she does not want it evaluated.  She refused these again today.   8.  She has refused interval follow-up CBCs.  9.  I I did commend her on her cessation of tobacco.  She said that she became concerned about the effects of tobacco and stop smoking.  Per sinuses of gotten better and although she still has some sinus problems.  Her shortness of breath is a bit improved.  10.  She refused a DEXA scan to check on bone density.  11.  She did have a flu shot and has had Covid vaccination  x3.  I commended her on her vaccinations.  12.  I did recommend weightbearing exercises.  She did refuse a DEXA scan. I discussed my recommendations and rationale.  She will continue with calcium and vitamin D.   13.  Follow up.  Follow up visit in person with SAHARA in next month for breast exam.  Follow up with me in person December 12 with mammogram and CBC, CMP.   please send info on Zometa.  [facility] [place] including pathology.     Thank you for allowing us to participate in this patient's care.     Sincerely,      South Esparza MD  Professor  [facility]  [phone]       7:15-7:22  I spent 7 minutes with the patient more than 50% of which was in counseling and coordination of care.

## 2022-12-15 NOTE — PROGRESS NOTES
Radha is a 72 year old who is being evaluated via a billable telephone visit.      What phone number would you like to be contacted at? [phone]   How would you like to obtain your AVS? Lory    Distant Location (provider location):  [facility]  Phone call duration: 7 minutes    Siddharth Campos    HISTORY OF PRESENT ILLNESS:   Radha Uribe is a 72-year-old woman who was referred to our Oncology Clinic by Dr. Crespo for evaluation of a newly diagnosed breast cancer. Radha was in her usual health until last month, when she was asked to do a mammogram by her primary care doctor, Dr. Preston. She has not been doing mammogram for the last 20 years, and most of the time she does self breast examinations. She noticed a mass, which was not painful, in her left breast, and immediately went for a mammogram. The mammogram was done on 09/13/2013, which revealed a 2.5 x 2 x 0.8 cm mass, so she had an ultrasound-guided core needle biopsy on 09/17, which revealed infiltrating ductal carcinoma, Daylin grade 3 (score 3 for tubule formation, 3 for nuclear atypia, 3 for mitosis). No angiolymphatic invasion was identified. There was a focal necrosis of invasive carcinoma identified, and no definite carcinoma in situ was identified. The greatest length of the needle biopsy at the left breast in the 2 o'clock position showed benign breast tissue with fibrocystic changes. There is a focal intraductal microcalcification identified. The tumor was 15-20% weakly to moderately positive for estrogen receptors and 5% positive for progesterone receptors. HER-2 was amplified. The ratio of HER-2/CEP17 signals were more than 5.9 by FISH analysis. The signal/nucleus was more than 19.4. She was seen by Dr. Crespo at the Breast Clinic on 09/24/13, who recommended to do an MRI of the breast due to density of the breast tissues, as well as referred her to the Breast Oncology Clinic for further staging workup and management of her breast cancer.  MRI of the breast showed BI-RADS 6 and mass in the left breast at the 11 o'clock position that measures about 1.6 x 1.6 x 1.6 cm. The longest diameter in sagittal reconstruction was 1.9 cm. There is no evidence of axillary lymph node involvement on the MRI.   She had neoadjuvant chemotherapy , mastectomy and SNL sampling which showed pathologic CR and no disease in the LN. This was followed by radiation therapy. She received 5000 cGy in 25 fractions, completed on 5/23/2014. She continued on treatment with herceptin for 13 cycles. This was completed on 12/9/2014.     TREATMENT SUMMARY:  A. Diagnosis in 2013.  The tumor was 15-20% weakly to moderately positive for estrogen receptors and 5% positive for progesterone receptors. HER-2 was amplified.  Path CR.  Neoadjuvant chemotherapy.    B.  Lumpectomy.  Radiation.  C.  Adjuvant Herceptin to complete 1 year.  Adjuvant letrozole.  D.  Continue adjuvant letrozole to complete 10 years thorough January, 2024.  E.  Covid vaccination x3 has been completed, flu shot completed.      FOLLOWUP NOTE       INTERVAL HISTORY:   Patient seen today via telephone visit.    I saw Radha Uribe by phone visit per her request.  She has no complaints today.  She says she is feeling great.  She has no pain, fatigue or depression.  She does have chronic anxiety.  She continues on letrozole and is tolerating it well with no joint stiffness, hot flashes or vaginal dryness.  She had a mammogram 12/01/2022, which was BI-RADS 2.  She quit smoking approximately 2 years ago, and I commended her on that.  She continues on Cozaar for hypertension.  She was on metformin for diabetes, but she stopped it because her hemoglobin A1c is now down to 5.9 with an improved diet.    I discussed with her that we will continue the aromatase inhibitor through the end of 2023.    She is working full time and plans to do so until she is 76.    REVIEW OF SYSTEMS:  A 10 point review of systems was negative.    She is  eating a healthful diet.  She exercises, but perhaps not quite at 150 minutes a week.  She is doing some walking.  She does take vitamin D 2000 international units daily as well as calcium daily.  She refused a DEXA scan.  Last DEXA scan was in 2014.  She also has refused adjuvant Zometa, which was offered to her today, but she said she would take the information sheets on that.  She refused a screening CT scan for lung cancer screening.     PHYSICAL EXAMINATION:    Vitals were not taken due to telephone visit.   No exam.  Alert, no acute distress, responds to questions appropriately  Speaks in full sentences, no respiratory distress  She had a normal mood.     LABORATORY DATA:   CBC normal except for glucose of 158. CBC shows Hb of 11.6     ASSESSMENT AND PLAN:   1.  Radha Uribe is a 72-year-old woman who was diagnosed in 2013 with a stage IIA, T2N2MX, invasive ductal carcinoma of the left breast.  Tumor was positive for estrogen receptor in 15%-20% of the cells, TN positive in 5%, HER2 was amplified.  Breast mass measured 2.5 x 2 x 0.8 cm in size at 11-o'clock position.  She was treated with neoadjuvant Taxol and Herceptin for 12 weeks followed by dose-dense AC for 4 cycles.  She had a pathologic complete response in the lumpectomy specimen.  She completed radiation in 04/2014.  She tolerated the treatment quite well.  She began letrozole and is tolerating it quite well and has had no hot flashes, myalgias or arthralgias.  The plan is to continue the letrozole for a total of 10 years of therapy.  She is tolerating the letrozole quite well.  2.  Radha Uribe is now 9 years out from her primary diagnosis with ER-positive, TN-positive, HER-2 amplified invasive carcinoma of the left breast.  She has no signs or symptoms of recurrence, but did not want to come in for a breast exam.  We will see if she would be willing to come in for a breast exam.  3.  I did discuss adjuvant Zometa with her. Adjuvant Zometa was not  standard of care when she was diagnosed with her breast cancer, but the 2017 [place] Overview of meta-analysis suggests a potential benefit for postmenopausal women.  She has not followed up with her DEXA scan, but has at the very least low bone density and has been on aromatase inhibitor for 9 years.  I strongly recommended adjuvant Zometa.  She said she will take the information sheets and think about it.  4.  Lung cancer screening.  She has an extensive smoking history of more than 20 pack years.  I advised her to have a screening CT scan, and she refused again.I recommended a low dose chest CT to screen for lung cancer and she refused again today.   5.  Type 2 diabetes.  She is off metformin now with good glucose control and is following up with her primary care provider.  She did not remember the name of her primary care provider today in the conversation.  6.  Medical records.  We will see if she can agree to Care Everywhere so we can get her pathology report from [facility].  7.  Anemia.  She again refuses evaluation and refuses upper and lower endoscopy.  I discussed with her there could be a cancer risk associated with gastrointestinal bleeding, but she does not want it evaluated.  She refused these again today.   8.  She has refused interval follow-up CBCs.  9.  I I did commend her on her cessation of tobacco.  She said that she became concerned about the effects of tobacco and stop smoking.  Per sinuses of gotten better and although she still has some sinus problems.  Her shortness of breath is a bit improved.  10.  She refused a DEXA scan to check on bone density.  11.  She did have a flu shot and has had Covid vaccination x3.  I commended her on her vaccinations.  12.  I did recommend weightbearing exercises.  She did refuse a DEXA scan. I discussed my recommendations and rationale.  She will continue with calcium and vitamin D.   13.  Follow up.  Follow up visit in person with SAHARA in  next month for breast exam.  Follow up with me in person December 12 with mammogram and CBC, CMP.   please send info on Zometa.  [facility] [place] [place] request for records including pathology.     Thank you for allowing us to participate in this patient's care.     Sincerely,      South Esparza MD  Professor  [facility]  [phone]       7:15-7:22  I spent 7 minutes with the patient more than 50% of which was in counseling and coordination of care.

## 2022-12-28 ENCOUNTER — PATIENT OUTREACH (OUTPATIENT)
Dept: ONCOLOGY | Facility: CLINIC | Age: 72
End: 2022-12-28

## 2022-12-28 NOTE — PROGRESS NOTES
Called patient's cell phone and left a VM to return call to writer to assist to re-schedule a missed appointment. Kathy Drummond RN, BSN Breast Center Nurse Coordinator

## 2023-01-24 ENCOUNTER — TELEPHONE (OUTPATIENT)
Dept: FAMILY MEDICINE | Facility: CLINIC | Age: 73
End: 2023-01-24

## 2023-01-24 NOTE — TELEPHONE ENCOUNTER
Patient Quality Outreach    Patient is due for the following:   Colon Cancer Screening  Physical Annual Wellness Visit      Topic Date Due     Zoster (Shingles) Vaccine (1 of 2) Never done     Pneumococcal Vaccine (2 - PCV) 09/22/2022       Next Steps:   Schedule a Annual Wellness Visit    Type of outreach:    Sent [employer] message.    Next Steps:  Reach out within 90 days via [employer].    Max number of attempts reached: No. Will try again in 90 days if patient still on fail list.    Questions for provider review:    None     Bonnie Aguilar MA  Chart routed to Care Team.

## 2023-05-27 ENCOUNTER — HEALTH MAINTENANCE LETTER (OUTPATIENT)
Age: 73
End: 2023-05-27

## 2023-06-10 ENCOUNTER — HEALTH MAINTENANCE LETTER (OUTPATIENT)
Age: 73
End: 2023-06-10

## 2023-08-01 ENCOUNTER — LAB (OUTPATIENT)
Dept: LAB | Facility: CLINIC | Age: 73
End: 2023-08-01
Payer: COMMERCIAL

## 2023-08-01 ENCOUNTER — OFFICE VISIT (OUTPATIENT)
Dept: FAMILY MEDICINE | Facility: CLINIC | Age: 73
End: 2023-08-01
Payer: COMMERCIAL

## 2023-08-01 VITALS
WEIGHT: 124 LBS | RESPIRATION RATE: 16 BRPM | HEIGHT: 66 IN | BODY MASS INDEX: 19.93 KG/M2 | HEART RATE: 82 BPM | DIASTOLIC BLOOD PRESSURE: 60 MMHG | SYSTOLIC BLOOD PRESSURE: 128 MMHG | OXYGEN SATURATION: 99 % | TEMPERATURE: 98.8 F

## 2023-08-01 DIAGNOSIS — C50.212 MALIGNANT NEOPLASM OF UPPER-INNER QUADRANT OF LEFT FEMALE BREAST, UNSPECIFIED ESTROGEN RECEPTOR STATUS (H): ICD-10-CM

## 2023-08-01 DIAGNOSIS — E11.9 TYPE 2 DIABETES MELLITUS WITHOUT COMPLICATION, WITHOUT LONG-TERM CURRENT USE OF INSULIN (H): ICD-10-CM

## 2023-08-01 DIAGNOSIS — E11.9 TYPE 2 DIABETES MELLITUS WITHOUT COMPLICATION, UNSPECIFIED WHETHER LONG TERM INSULIN USE (H): ICD-10-CM

## 2023-08-01 DIAGNOSIS — Z28.21 IMMUNIZATION DECLINED: ICD-10-CM

## 2023-08-01 DIAGNOSIS — E78.5 HYPERLIPIDEMIA LDL GOAL <100: ICD-10-CM

## 2023-08-01 DIAGNOSIS — Z00.00 ROUTINE HISTORY AND PHYSICAL EXAMINATION OF ADULT: Primary | ICD-10-CM

## 2023-08-01 DIAGNOSIS — Z53.20 COLON CANCER SCREENING DECLINED: ICD-10-CM

## 2023-08-01 LAB
ALBUMIN SERPL BCG-MCNC: 4.6 G/DL (ref 3.5–5.2)
ALP SERPL-CCNC: 93 U/L (ref 35–104)
ALT SERPL W P-5'-P-CCNC: 23 U/L (ref 0–50)
ANION GAP SERPL CALCULATED.3IONS-SCNC: 10 MMOL/L (ref 7–15)
AST SERPL W P-5'-P-CCNC: 23 U/L (ref 0–45)
BILIRUB SERPL-MCNC: 0.3 MG/DL
BUN SERPL-MCNC: 16.1 MG/DL (ref 8–23)
CALCIUM SERPL-MCNC: 9.6 MG/DL (ref 8.8–10.2)
CHLORIDE SERPL-SCNC: 100 MMOL/L (ref 98–107)
CREAT SERPL-MCNC: 0.84 MG/DL (ref 0.51–0.95)
CREAT UR-MCNC: 21.5 MG/DL
DEPRECATED HCO3 PLAS-SCNC: 26 MMOL/L (ref 22–29)
GFR SERPL CREATININE-BSD FRML MDRD: 73 ML/MIN/1.73M2
GLUCOSE SERPL-MCNC: 172 MG/DL (ref 70–99)
HBA1C MFR BLD: 7.8 % (ref 0–5.6)
LDLC SERPL DIRECT ASSAY-MCNC: 134 MG/DL
MICROALBUMIN UR-MCNC: <12 MG/L
MICROALBUMIN/CREAT UR: NORMAL MG/G{CREAT}
POTASSIUM SERPL-SCNC: 4.3 MMOL/L (ref 3.4–5.3)
PROT SERPL-MCNC: 7.1 G/DL (ref 6.4–8.3)
SODIUM SERPL-SCNC: 136 MMOL/L (ref 136–145)

## 2023-08-01 PROCEDURE — 36415 COLL VENOUS BLD VENIPUNCTURE: CPT

## 2023-08-01 PROCEDURE — 80053 COMPREHEN METABOLIC PANEL: CPT

## 2023-08-01 PROCEDURE — 82570 ASSAY OF URINE CREATININE: CPT

## 2023-08-01 PROCEDURE — 83721 ASSAY OF BLOOD LIPOPROTEIN: CPT

## 2023-08-01 PROCEDURE — 99397 PER PM REEVAL EST PAT 65+ YR: CPT | Performed by: NURSE PRACTITIONER

## 2023-08-01 PROCEDURE — 83036 HEMOGLOBIN GLYCOSYLATED A1C: CPT

## 2023-08-01 PROCEDURE — 82043 UR ALBUMIN QUANTITATIVE: CPT

## 2023-08-01 ASSESSMENT — ENCOUNTER SYMPTOMS
ENDOCRINE NEGATIVE: 1
HEMATOLOGIC/LYMPHATIC NEGATIVE: 1
RESPIRATORY NEGATIVE: 1
ALLERGIC/IMMUNOLOGIC NEGATIVE: 1
EYES NEGATIVE: 1
CARDIOVASCULAR NEGATIVE: 1
CONSTITUTIONAL NEGATIVE: 1
MUSCULOSKELETAL NEGATIVE: 1
PSYCHIATRIC NEGATIVE: 1
NEUROLOGICAL NEGATIVE: 1
GASTROINTESTINAL NEGATIVE: 1

## 2023-08-01 ASSESSMENT — PAIN SCALES - GENERAL: PAINLEVEL: NO PAIN (0)

## 2023-08-01 NOTE — PROGRESS NOTES
Assessment & Plan   Problem List Items Addressed This Visit       Type 2 diabetes mellitus without complication, unspecified whether long term insulin use (H)    Relevant Medications    metFORMIN (GLUCOPHAGE) 500 MG tablet    Other Relevant Orders    HEMOGLOBIN A1C (Completed)    Albumin Random Urine Quantitative with Creat Ratio    Malignant neoplasm of upper-inner quadrant of left female breast, unspecified estrogen receptor status (H)    Immunization declined    Hyperlipidemia LDL goal <100    Relevant Orders    Comprehensive metabolic panel (BMP + Alb, Alk Phos, ALT, AST, Total. Bili, TP)    LDL cholesterol direct     Other Visit Diagnoses       Routine history and physical examination of adult    -  Primary    Relevant Orders    REVIEW OF HEALTH MAINTENANCE PROTOCOL ORDERS (Completed)    Colon cancer screening declined        Type 2 diabetes mellitus without complication, without long-term current use of insulin (H)        Relevant Medications    metFORMIN (GLUCOPHAGE) 500 MG tablet             No LOS data to display   Time spent by me doing chart review, history and exam, documentation and further activities per the note       MEDICATIONS:        - Resume Metformin       - Continue other medications without change  Regular exercise  See Patient Instructions  Patient Instructions   Shots to consider: TDAP; Shingles; Pneumococcal 20  Supplements to consider:   Magnesium (start at around 200 mg)-may cause loose stools at higher doses  Melatonin (start at 1 mg,; maximum is 10 mg)  Consider guided relaxation CD from [employer]- try a few different ones to see what you like.   Consider strength training for anxiety-  Seated aerobics    NATHAN Millan CNP [facility]    Gaudencio Garcia is a 73 year old, presenting for the following health issues:  Diabetes        8/1/2023     8:46 AM   Additional Questions   Roomed by Jerson Dowling   Accompanied by N/A       History of Present  "Illness       Diabetes:   She presents for follow up of diabetes.    She is not checking blood glucose.         She has no concerns regarding her diabetes at this time.   She is not experiencing numbness or burning in feet, excessive thirst, blurry vision, weight changes or redness, sores or blisters on feet. The patient has not had a diabetic eye exam in the last 12 months.          She eats 0-1 servings of fruits and vegetables daily.She consumes 0 sweetened beverage(s) daily.She exercises with enough effort to increase her heart rate 9 or less minutes per day.  She exercises with enough effort to increase her heart rate 3 or less days per week. She is missing 7 dose(s) of medications per week.  She is not taking prescribed medications regularly due to remembering to take.       Annual Wellness Visit  Patient has been advised of split billing requirements and indicates understanding: Yes     Are you in the first 12 months of your Medicare Part B coverage?  No    Physical Health:  In general, how would you rate your overall physical health? good    If you drink alcohol do you typically have >3 drinks per day or >7 drinks per week? No  Do you usually eat at least 4 servings of fruit and vegetables a day, include whole grains & fiber and avoid regularly eating high fat or \"junk\" foods? No  Do you have any problems taking medications regularly? No  Do you have any side effects from medications? none  Needs assistance for the following daily activities: no assistance needed  Which of the following safety concerns are present in your home?  none identified   Hearing impairment: No      Mental Health:  In general, how would you rate your overall mental or emotional health? good  PHQ-2 Score: 0    Do you feel safe in your environment? Yes    Have you ever done Advance Care Planning? (For example, a Health Directive, POLST, or a discussion with a medical provider or your loved ones about your wishes)? Yes, patient states " "has an Advance Care Planning document and will bring a copy to the clinic.    Fall risk:  Fall Risk Assessment not completed.    Do you have sleep apnea, excessive snoring or daytime drowsiness? : no    Social History     Tobacco Use    Smoking status: Former     Packs/day: 1.00     Types: Cigarettes     Start date:      Quit date:      Years since quittin.5    Smokeless tobacco: Former     Quit date: 1/3/2021   Substance Use Topics    Alcohol use: No     Alcohol/week: 0.0 standard drinks of alcohol            No data to display              Do you have a current opioid prescription? No  Do you use any other controlled substances or medications that are not prescribed by a provider? None    Current providers sharing in care for this patient include:   Patient Care Team:  Lilibeth Bianchi APRN CNP as PCP - General (Family Medicine)  South Esparza MD as Assigned Cancer Care Provider  Lilibeth Bianchi APRN CNP as Assigned PCP    Patient has been advised of split billing requirements and indicates understanding: Yes  I have reviewed Opioid Use Disorder and Substance Use Disorder risk factors and made any needed referrals.         Review of Systems   Constitutional: Negative.    HENT: Negative.     Eyes: Negative.    Respiratory: Negative.     Cardiovascular: Negative.    Gastrointestinal: Negative.    Endocrine: Negative.    Genitourinary: Negative.    Musculoskeletal: Negative.    Allergic/Immunologic: Negative.    Neurological: Negative.    Hematological: Negative.    Psychiatric/Behavioral: Negative.              Objective    /60   Pulse 82   Temp 98.8  F (37.1  C) (Temporal)   Resp 16   Ht 1.676 m (5' 6\")   Wt 56.2 kg (124 lb)   LMP  (LMP Unknown)   SpO2 99%   BMI 20.01 kg/m    Body mass index is 20.01 kg/m .  Physical Exam  Constitutional:       Appearance: Normal appearance. She is normal weight.   HENT:      Head: Normocephalic and atraumatic.      Right Ear: Tympanic " membrane normal.      Left Ear: Tympanic membrane normal.      Nose: Nose normal.      Mouth/Throat:      Mouth: Mucous membranes are moist.   Eyes:      Extraocular Movements: Extraocular movements intact.      Conjunctiva/sclera: Conjunctivae normal.      Pupils: Pupils are equal, round, and reactive to light.   Cardiovascular:      Rate and Rhythm: Normal rate and regular rhythm.   Pulmonary:      Effort: Pulmonary effort is normal.      Breath sounds: Normal breath sounds.   Abdominal:      General: Abdomen is flat. Bowel sounds are normal.      Palpations: Abdomen is soft.   Musculoskeletal:         General: Normal range of motion.      Cervical back: Normal range of motion and neck supple.   Skin:     General: Skin is warm and dry.   Neurological:      General: No focal deficit present.      Mental Status: She is alert and oriented to person, place, and time.   Psychiatric:         Mood and Affect: Mood normal.         Behavior: Behavior normal.          Results for orders placed or performed in visit on 08/01/23   HEMOGLOBIN A1C     Status: Abnormal   Result Value Ref Range    Hemoglobin A1C 7.8 (H) 0.0 - 5.6 %

## 2023-08-01 NOTE — PATIENT INSTRUCTIONS
Shots to consider: TDAP; Shingles; Pneumococcal 20  Supplements to consider:   Magnesium (start at around 200 mg)-may cause loose stools at higher doses  Melatonin (start at 1 mg,; maximum is 10 mg)  Consider guided relaxation CD from [employer]- try a few different ones to see what you like.   Consider strength training for anxiety-  Seated aerobics

## 2023-08-03 DIAGNOSIS — I10 HYPERTENSION GOAL BP (BLOOD PRESSURE) < 140/90: ICD-10-CM

## 2023-08-03 DIAGNOSIS — E11.9 TYPE 2 DIABETES MELLITUS WITHOUT COMPLICATION, WITHOUT LONG-TERM CURRENT USE OF INSULIN (H): ICD-10-CM

## 2023-08-03 DIAGNOSIS — E78.00 HYPERCHOLESTEROLEMIA: ICD-10-CM

## 2023-08-03 RX ORDER — SIMVASTATIN 20 MG
20 TABLET ORAL DAILY
Qty: 90 TABLET | Refills: 1 | Status: SHIPPED | OUTPATIENT
Start: 2023-08-03 | End: 2024-03-06

## 2023-08-03 RX ORDER — LOSARTAN POTASSIUM 100 MG/1
100 TABLET ORAL DAILY
Qty: 90 TABLET | Refills: 1 | Status: SHIPPED | OUTPATIENT
Start: 2023-08-03 | End: 2023-12-20

## 2023-08-03 RX ORDER — HYDROCHLOROTHIAZIDE 12.5 MG/1
CAPSULE ORAL
Qty: 90 CAPSULE | Refills: 1 | Status: SHIPPED | OUTPATIENT
Start: 2023-08-03

## 2023-08-03 RX ORDER — METOPROLOL SUCCINATE 100 MG/1
100 TABLET, EXTENDED RELEASE ORAL DAILY
Qty: 90 TABLET | Refills: 1 | Status: SHIPPED | OUTPATIENT
Start: 2023-08-03 | End: 2024-04-16

## 2023-08-03 NOTE — TELEPHONE ENCOUNTER
"Requested Prescriptions   Pending Prescriptions Disp Refills    losartan (COZAAR) 100 MG tablet [Pharmacy Med Name: LOSARTAN POTASSIUM 100MG TABS] 90 tablet 1     Sig: TAKE ONE TABLET BY MOUTH ONCE DAILY       Angiotensin-II Receptors Passed - 8/3/2023  8:45 AM        Passed - Last blood pressure under 140/90 in past 12 months     BP Readings from Last 3 Encounters:   08/01/23 128/60   05/09/22 135/75   11/30/21 (!) 162/79                 Passed - Recent (12 mo) or future (30 days) visit within the authorizing provider's specialty     Patient has had an office visit with the authorizing provider or a provider within the authorizing providers department within the previous 12 mos or has a future within next 30 days. See \"Patient Info\" tab in inbasket, or \"Choose Columns\" in Meds & Orders section of the refill encounter.              Passed - Medication is active on med list        Passed - Patient is age 18 or older        Passed - No active pregnancy on record        Passed - Normal serum creatinine on file in past 12 months     Recent Labs   Lab Test 08/01/23  0934   CR 0.84       Ok to refill medication if creatinine is low          Passed - Normal serum potassium on file in past 12 months     Recent Labs   Lab Test 08/01/23  0934   POTASSIUM 4.3                    Passed - No positive pregnancy test in past 12 months          hydrochlorothiazide (MICROZIDE) 12.5 MG capsule 90 capsule 1     Sig: TAKE 1 CAPSULE BY MOUTH DAILY.       Diuretics (Including Combos) Protocol Passed - 8/3/2023  8:45 AM        Passed - Blood pressure under 140/90 in past 12 months     BP Readings from Last 3 Encounters:   08/01/23 128/60   05/09/22 135/75   11/30/21 (!) 162/79                 Passed - Recent (12 mo) or future (30 days) visit within the authorizing provider's specialty     Patient has had an office visit with the authorizing provider or a provider within the authorizing providers department within the previous 12 mos or " "has a future within next 30 days. See \"Patient Info\" tab in inbasket, or \"Choose Columns\" in Meds & Orders section of the refill encounter.              Passed - Medication is active on med list        Passed - Patient is age 18 or older        Passed - No active pregancy on record        Passed - Normal serum creatinine on file in past 12 months     Recent Labs   Lab Test 08/01/23  0934   CR 0.84              Passed - Normal serum potassium on file in past 12 months     Recent Labs   Lab Test 08/01/23  0934   POTASSIUM 4.3                    Passed - Normal serum sodium on file in past 12 months     Recent Labs   Lab Test 08/01/23  0934                 Passed - No positive pregnancy test in past 12 months          metoprolol succinate ER (TOPROL XL) 100 MG 24 hr tablet 90 tablet 1     Sig: Take 1 tablet (100 mg) by mouth daily       Beta-Blockers Protocol Passed - 8/3/2023  8:45 AM        Passed - Blood pressure under 140/90 in past 12 months     BP Readings from Last 3 Encounters:   08/01/23 128/60   05/09/22 135/75   11/30/21 (!) 162/79                 Passed - Patient is age 6 or older        Passed - Recent (12 mo) or future (30 days) visit within the authorizing provider's specialty     Patient has had an office visit with the authorizing provider or a provider within the authorizing providers department within the previous 12 mos or has a future within next 30 days. See \"Patient Info\" tab in inbasket, or \"Choose Columns\" in Meds & Orders section of the refill encounter.              Passed - Medication is active on med list          simvastatin (ZOCOR) 20 MG tablet 90 tablet 1     Sig: Take 1 tablet (20 mg) by mouth daily       Statins Protocol Passed - 8/3/2023  8:45 AM        Passed - LDL on file in past 12 months     Recent Labs   Lab Test 08/01/23  0934   *             Passed - No abnormal creatine kinase in past 12 months     No lab results found.             Passed - Recent (12 mo) or " "future (30 days) visit within the authorizing provider's specialty     Patient has had an office visit with the authorizing provider or a provider within the authorizing providers department within the previous 12 mos or has a future within next 30 days. See \"Patient Info\" tab in inbasket, or \"Choose Columns\" in Meds & Orders section of the refill encounter.              Passed - Medication is active on med list        Passed - Patient is age 18 or older        Passed - No active pregnancy on record        Passed - No positive pregnancy test in past 12 months          Prescription approved per [facility] Refill Protocol.     Pt was last seen on 08/01/23    Tiffanie Ugalde RN  [facility]   "

## 2023-08-15 DIAGNOSIS — E11.9 TYPE 2 DIABETES MELLITUS WITHOUT COMPLICATION, WITHOUT LONG-TERM CURRENT USE OF INSULIN (H): ICD-10-CM

## 2023-12-10 NOTE — PROGRESS NOTES
Virtual Visit Details    Type of service:  Telephone Visit   Phone call duration: 7 minutes     ISTORY OF PRESENT ILLNESS:   Radha Uribe is a 72-year-old woman who was referred to our Oncology Clinic by Dr. Crespo for evaluation of a newly diagnosed breast cancer. Radha was in her usual health until last month, when she was asked to do a mammogram by her primary care doctor, Dr. Preston. She has not been doing mammogram for the last 20 years, and most of the time she does self breast examinations. She noticed a mass, which was not painful, in her left breast, and immediately went for a mammogram. The mammogram was done on 09/13/2013, which revealed a 2.5 x 2 x 0.8 cm mass, so she had an ultrasound-guided core needle biopsy on 09/17, which revealed infiltrating ductal carcinoma, [place] grade 3 (score 3 for tubule formation, 3 for nuclear atypia, 3 for mitosis). No angiolymphatic invasion was identified. There was a focal necrosis of invasive carcinoma identified, and no definite carcinoma in situ was identified. The greatest length of the needle biopsy at the left breast in the 2 o'clock position showed benign breast tissue with fibrocystic changes. There is a focal intraductal microcalcification identified. The tumor was 15-20% weakly to moderately positive for estrogen receptors and 5% positive for progesterone receptors. HER-2 was amplified. The ratio of HER-2/CEP17 signals were more than 5.9 by FISH analysis. The signal/nucleus was more than 19.4. She was seen by Dr. Crespo at the Breast Clinic on 09/24/13, who recommended to do an MRI of the breast due to density of the breast tissues, as well as referred her to the Breast Oncology Clinic for further staging workup and management of her breast cancer. MRI of the breast showed BI-RADS 6 and mass in the left breast at the 11 o'clock position that measures about 1.6 x 1.6 x 1.6 cm. The longest diameter in sagittal reconstruction was 1.9 cm. There is no evidence of  axillary lymph node involvement on the MRI.   She had neoadjuvant chemotherapy , mastectomy and SNL sampling which showed pathologic CR and no disease in the LN. This was followed by radiation therapy. She received 5000 cGy in 25 fractions, completed on 5/23/2014. She continued on treatment with herceptin for 13 cycles. This was completed on 12/9/2014.     TREATMENT SUMMARY:  A. Diagnosis in 2013.  The tumor was 15-20% weakly to moderately positive for estrogen receptors and 5% positive for progesterone receptors. HER-2 was amplified.  Path CR.  Neoadjuvant chemotherapy.    B.  Lumpectomy.  Radiation.  C.  Adjuvant Herceptin to complete 1 year.  Adjuvant letrozole.  D.  Continue adjuvant letrozole to complete 10 years thorough January, 2024 but she stopped it early in 2023 at 9 years.  E.  Covid vaccination x3 has been completed, flu shot completed.      FOLLOWUP NOTE       INTERVAL HISTORY:   Patient seen today via telephone visit.  Canceled in person follow-up and wanted to follow-up by phone.  I did tell her that follow-up with breast exam would be preferable but she declined.  She denies any breast problems at this time.  She said she will come for a breast exam in a year.  She has no pain, no fatigue, no depression, no anxiety.  She does have some problems with insomnia.  She has a primary care provider Dr. Nicole Bianchi with whom she follows up.  Her main complaint is sinusitis and nasal drainage.  She does not want a referral for sinus problems at this time.     I discussed with her that we will continue the aromatase inhibitor through the end of 2023.     She is working full time and plans to do so until she is 76.  She does take vitamin D 2000 international units/day and calcium 1 g/day.  Her diabetes has been well-controlled     REVIEW OF SYSTEMS:  She denies fevers or chills, cough, chest pain, shortness of breath, nausea, vomiting, constipation, diarrhea, she does have chronic back pain.  She denies  headache.  A 10 point review of systems was otherwise negative.       She is eating a healthful diet.  She does not have an exercise program.  She is doing some walking.  She does take vitamin D 2000 international units daily as well as calcium daily.  She declined a screening chest CT scan to screen for lung cancer, she declined a DEXA scan to check bone density, she has declined upper and lower endoscopy because of borderline anemia, she declined adjuvant zoledronic acid.  She has had COVID vaccination.  She did stop smoking 3 years ago.       PHYSICAL EXAMINATION:    Vitals were not taken due to telephone visit.   No exam.  Alert, no acute distress, responds to questions appropriately  Speaks in full sentences, no respiratory distress  She had a normal mood.     LABORATORY DATA:   CBC normal except for glucose of 133. CBC shows Hb of 11.9     ASSESSMENT AND PLAN:   1.  Radha Uribe is a 73-year-old woman who was diagnosed in 2013 with a stage IIA, T2N2MX, invasive ductal carcinoma of the left breast.  Tumor was positive for estrogen receptor in 15%-20% of the cells, FL positive in 5%, HER2 was amplified.  Breast mass measured 2.5 x 2 x 0.8 cm in size at 11-o'clock position.  She was treated with neoadjuvant Taxol and Herceptin for 12 weeks followed by dose-dense AC for 4 cycles.  She had a pathologic complete response in the lumpectomy specimen.  She completed radiation in 04/2014.  She tolerated the treatment quite well.  She began letrozole and is tolerating it quite well and has had no hot flashes, myalgias or arthralgias.  The plan was to continue letrozole for a total of 10 years of therapy.  She quit letrozole on her own about a year ago.  She completed 9 years.  2.  Radha Uribe is now 10 years out from her primary diagnosis with ER-positive, FL-positive, HER-2 amplified invasive carcinoma of the left breast.  She has no signs or symptoms of recurrence, but did not want to come in for a breast exam.  She  is willing to come in person for a breast exam and mammogram a year from now.  3.  I declined adjuvant Zometa.  4.  I advised her to have a screening CT scan, and she refused again.  I recommended a low dose chest CT to screen for lung cancer and she refused again today.   5.  Bone health. She declined a Dexa.  She does take vitamin D3 2000 international unit(s) and calcium.  6.  Type 2 diabetes.  She is off metformin now with good glucose control and is following up with her primary care provider.  She did not remember the name of her primary care provider today in the conversation.  6.  Covid vaccination.  She says that she has had Covid vaccination.  7.  Anemia.  She declined evaluation and refuses upper and lower endoscopy.    8.  I did commend her on her cessation of tobacco.  She said that she became concerned about the effects of tobacco and stop smoking.  Sinus drainage remains a problem.  She does not want consultation for this problem.  Her shortness of breath is a bit improved.  9.  She refused a DEXA scan to check on bone density.  10.  She did have a flu shot and has had Covid vaccination x 3.  I commended her on her vaccinations.  11.  I did recommend weightbearing exercises.  She did refuse a DEXA scan. I discussed my recommendations and rationale.  She will continue with calcium and vitamin D.   12.  Follow up.  Follow up visit with me in person December 17 with mammogram and CBC, CMP.      Thank you for allowing us to participate in this patient's care.      Sincerely,      South Esparza MD  Professor  [facility]  [phone]         7:43-7:50  I spent 7 minutes with the patient more than 50% of which was in counseling and coordination of care

## 2023-12-12 ENCOUNTER — ANCILLARY PROCEDURE (OUTPATIENT)
Dept: MAMMOGRAPHY | Facility: CLINIC | Age: 73
End: 2023-12-12
Payer: COMMERCIAL

## 2023-12-12 ENCOUNTER — LAB (OUTPATIENT)
Dept: LAB | Facility: CLINIC | Age: 73
End: 2023-12-12
Attending: INTERNAL MEDICINE
Payer: COMMERCIAL

## 2023-12-12 VITALS
HEART RATE: 85 BPM | DIASTOLIC BLOOD PRESSURE: 67 MMHG | TEMPERATURE: 97.9 F | BODY MASS INDEX: 19.27 KG/M2 | WEIGHT: 119.4 LBS | RESPIRATION RATE: 16 BRPM | SYSTOLIC BLOOD PRESSURE: 158 MMHG | OXYGEN SATURATION: 97 %

## 2023-12-12 DIAGNOSIS — C50.212 MALIGNANT NEOPLASM OF UPPER-INNER QUADRANT OF LEFT FEMALE BREAST, UNSPECIFIED ESTROGEN RECEPTOR STATUS (H): Primary | ICD-10-CM

## 2023-12-12 DIAGNOSIS — Z12.31 VISIT FOR SCREENING MAMMOGRAM: ICD-10-CM

## 2023-12-12 LAB
ALBUMIN SERPL BCG-MCNC: 4.4 G/DL (ref 3.5–5.2)
ALP SERPL-CCNC: 67 U/L (ref 40–150)
ALT SERPL W P-5'-P-CCNC: 20 U/L (ref 0–50)
ANION GAP SERPL CALCULATED.3IONS-SCNC: 13 MMOL/L (ref 7–15)
AST SERPL W P-5'-P-CCNC: 26 U/L (ref 0–45)
BASOPHILS # BLD AUTO: 0.1 10E3/UL (ref 0–0.2)
BASOPHILS NFR BLD AUTO: 1 %
BILIRUB SERPL-MCNC: 0.3 MG/DL
BUN SERPL-MCNC: 17.7 MG/DL (ref 8–23)
CALCIUM SERPL-MCNC: 10.1 MG/DL (ref 8.8–10.2)
CHLORIDE SERPL-SCNC: 99 MMOL/L (ref 98–107)
CREAT SERPL-MCNC: 0.82 MG/DL (ref 0.51–0.95)
DEPRECATED HCO3 PLAS-SCNC: 26 MMOL/L (ref 22–29)
EGFRCR SERPLBLD CKD-EPI 2021: 76 ML/MIN/1.73M2
EOSINOPHIL # BLD AUTO: 0.1 10E3/UL (ref 0–0.7)
EOSINOPHIL NFR BLD AUTO: 1 %
ERYTHROCYTE [DISTWIDTH] IN BLOOD BY AUTOMATED COUNT: 12 % (ref 10–15)
GLUCOSE SERPL-MCNC: 133 MG/DL (ref 70–99)
HCT VFR BLD AUTO: 34.9 % (ref 35–47)
HGB BLD-MCNC: 11.9 G/DL (ref 11.7–15.7)
HOLD SPECIMEN: NORMAL
HOLD SPECIMEN: NORMAL
IMM GRANULOCYTES # BLD: 0.1 10E3/UL
IMM GRANULOCYTES NFR BLD: 1 %
LYMPHOCYTES # BLD AUTO: 1.8 10E3/UL (ref 0.8–5.3)
LYMPHOCYTES NFR BLD AUTO: 17 %
MCH RBC QN AUTO: 30.9 PG (ref 26.5–33)
MCHC RBC AUTO-ENTMCNC: 34.1 G/DL (ref 31.5–36.5)
MCV RBC AUTO: 91 FL (ref 78–100)
MONOCYTES # BLD AUTO: 0.7 10E3/UL (ref 0–1.3)
MONOCYTES NFR BLD AUTO: 6 %
NEUTROPHILS # BLD AUTO: 7.9 10E3/UL (ref 1.6–8.3)
NEUTROPHILS NFR BLD AUTO: 74 %
NRBC # BLD AUTO: 0 10E3/UL
NRBC BLD AUTO-RTO: 0 /100
PLATELET # BLD AUTO: 291 10E3/UL (ref 150–450)
POTASSIUM SERPL-SCNC: 4.4 MMOL/L (ref 3.4–5.3)
PROT SERPL-MCNC: 7.2 G/DL (ref 6.4–8.3)
RBC # BLD AUTO: 3.85 10E6/UL (ref 3.8–5.2)
SODIUM SERPL-SCNC: 138 MMOL/L (ref 135–145)
WBC # BLD AUTO: 10.7 10E3/UL (ref 4–11)

## 2023-12-12 PROCEDURE — 80053 COMPREHEN METABOLIC PANEL: CPT | Performed by: INTERNAL MEDICINE

## 2023-12-12 PROCEDURE — 36415 COLL VENOUS BLD VENIPUNCTURE: CPT | Performed by: INTERNAL MEDICINE

## 2023-12-12 PROCEDURE — 77067 SCR MAMMO BI INCL CAD: CPT | Mod: GC | Performed by: RADIOLOGY

## 2023-12-12 PROCEDURE — 85004 AUTOMATED DIFF WBC COUNT: CPT | Performed by: INTERNAL MEDICINE

## 2023-12-12 PROCEDURE — 77063 BREAST TOMOSYNTHESIS BI: CPT | Mod: GC | Performed by: RADIOLOGY

## 2023-12-12 ASSESSMENT — PAIN SCALES - GENERAL: PAINLEVEL: NO PAIN (0)

## 2023-12-12 NOTE — NURSING NOTE
Chief Complaint   Patient presents with    Blood Draw     Labs drawn via  by RN in lab     Labs collected from venipuncture by RN. Vitals taken. Checked in for appointment(s).     Kari Michael RN

## 2023-12-14 ENCOUNTER — VIRTUAL VISIT (OUTPATIENT)
Dept: ONCOLOGY | Facility: CLINIC | Age: 73
End: 2023-12-14
Attending: INTERNAL MEDICINE
Payer: COMMERCIAL

## 2023-12-14 VITALS — HEIGHT: 66 IN | WEIGHT: 119 LBS | BODY MASS INDEX: 19.13 KG/M2

## 2023-12-14 DIAGNOSIS — C50.212 MALIGNANT NEOPLASM OF UPPER-INNER QUADRANT OF LEFT FEMALE BREAST, UNSPECIFIED ESTROGEN RECEPTOR STATUS (H): Primary | ICD-10-CM

## 2023-12-14 PROCEDURE — 99441 PR PHYSICIAN TELEPHONE EVALUATION 5-10 MIN: CPT | Performed by: INTERNAL MEDICINE

## 2023-12-14 ASSESSMENT — PAIN SCALES - GENERAL: PAINLEVEL: NO PAIN (0)

## 2023-12-14 NOTE — NURSING NOTE
Patient confirms medications and allergies are accurate via patients echeck in completion, and or denies any changes since last reviewed/verified.     Is the patient currently in the state of [place]? YES    Visit mode:TELEPHONE    If the visit is dropped, the patient can be reconnected by: VIDEO VISIT: Text to cell phone:   Telephone Information:   Mobile [phone]       Will anyone else be joining the visit? NO  (If patient encounters technical issues they should call [phone]    How would you like to obtain your AVS? MyChart    Are changes needed to the allergy or medication list? No    Reason for visit: RECHECK    Karol KERR

## 2023-12-14 NOTE — LETTER
12/14/2023         RE: Radha Uribe  [address]  [facility] [address]        Dear Colleague,    Thank you for referring your patient, Radha Uribe, to the [facility]. Please see a copy of my visit note below.    Virtual Visit Details    Type of service:  Telephone Visit   Phone call duration: 7 minutes     ISTORY OF PRESENT ILLNESS:   Radha Uribe is a 72-year-old woman who was referred to our Oncology Clinic by Dr. Crespo for evaluation of a newly diagnosed breast cancer. Radha was in her usual health until last month, when she was asked to do a mammogram by her primary care doctor, Dr. Preston. She has not been doing mammogram for the last 20 years, and most of the time she does self breast examinations. She noticed a mass, which was not painful, in her left breast, and immediately went for a mammogram. The mammogram was done on 09/13/2013, which revealed a 2.5 x 2 x 0.8 cm mass, so she had an ultrasound-guided core needle biopsy on 09/17, which revealed infiltrating ductal carcinoma, [place] grade 3 (score 3 for tubule formation, 3 for nuclear atypia, 3 for mitosis). No angiolymphatic invasion was identified. There was a focal necrosis of invasive carcinoma identified, and no definite carcinoma in situ was identified. The greatest length of the needle biopsy at the left breast in the 2 o'clock position showed benign breast tissue with fibrocystic changes. There is a focal intraductal microcalcification identified. The tumor was 15-20% weakly to moderately positive for estrogen receptors and 5% positive for progesterone receptors. HER-2 was amplified. The ratio of HER-2/CEP17 signals were more than 5.9 by FISH analysis. The signal/nucleus was more than 19.4. She was seen by Dr. Crespo at the Breast Clinic on 09/24/13, who recommended to do an MRI of the breast due to density of the breast tissues, as well as referred her to the Breast Oncology Clinic for further  staging workup and management of her breast cancer. MRI of the breast showed BI-RADS 6 and mass in the left breast at the 11 o'clock position that measures about 1.6 x 1.6 x 1.6 cm. The longest diameter in sagittal reconstruction was 1.9 cm. There is no evidence of axillary lymph node involvement on the MRI.   She had neoadjuvant chemotherapy , mastectomy and SNL sampling which showed pathologic CR and no disease in the LN. This was followed by radiation therapy. She received 5000 cGy in 25 fractions, completed on 5/23/2014. She continued on treatment with herceptin for 13 cycles. This was completed on 12/9/2014.     TREATMENT SUMMARY:  A. Diagnosis in 2013.  The tumor was 15-20% weakly to moderately positive for estrogen receptors and 5% positive for progesterone receptors. HER-2 was amplified.  Path CR.  Neoadjuvant chemotherapy.    B.  Lumpectomy.  Radiation.  C.  Adjuvant Herceptin to complete 1 year.  Adjuvant letrozole.  D.  Continue adjuvant letrozole to complete 10 years thorough January, 2024 but she stopped it early in 2023 at 9 years.  E.  Covid vaccination x3 has been completed, flu shot completed.      FOLLOWUP NOTE       INTERVAL HISTORY:   Patient seen today via telephone visit.  Canceled in person follow-up and wanted to follow-up by phone.  I did tell her that follow-up with breast exam would be preferable but she declined.  She denies any breast problems at this time.  She said she will come for a breast exam in a year.  She has no pain, no fatigue, no depression, no anxiety.  She does have some problems with insomnia.  She has a primary care provider Dr. Nicole Bianchi with whom she follows up.  Her main complaint is sinusitis and nasal drainage.  She does not want a referral for sinus problems at this time.     I discussed with her that we will continue the aromatase inhibitor through the end of 2023.     She is working full time and plans to do so until she is 76.  She does take vitamin D 2000  international units/day and calcium 1 g/day.  Her diabetes has been well-controlled     REVIEW OF SYSTEMS:  She denies fevers or chills, cough, chest pain, shortness of breath, nausea, vomiting, constipation, diarrhea, she does have chronic back pain.  She denies headache.  A 10 point review of systems was otherwise negative.       She is eating a healthful diet.  She does not have an exercise program.  She is doing some walking.  She does take vitamin D 2000 international units daily as well as calcium daily.  She declined a screening chest CT scan to screen for lung cancer, she declined a DEXA scan to check bone density, she has declined upper and lower endoscopy because of borderline anemia, she declined adjuvant zoledronic acid.  She has had COVID vaccination.  She did stop smoking 3 years ago.       PHYSICAL EXAMINATION:    Vitals were not taken due to telephone visit.   No exam.  Alert, no acute distress, responds to questions appropriately  Speaks in full sentences, no respiratory distress  She had a normal mood.     LABORATORY DATA:   CBC normal except for glucose of 133. CBC shows Hb of 11.9     ASSESSMENT AND PLAN:   1.  Radha Uribe is a 73-year-old woman who was diagnosed in 2013 with a stage IIA, T2N2MX, invasive ductal carcinoma of the left breast.  Tumor was positive for estrogen receptor in 15%-20% of the cells, OK positive in 5%, HER2 was amplified.  Breast mass measured 2.5 x 2 x 0.8 cm in size at 11-o'clock position.  She was treated with neoadjuvant Taxol and Herceptin for 12 weeks followed by dose-dense AC for 4 cycles.  She had a pathologic complete response in the lumpectomy specimen.  She completed radiation in 04/2014.  She tolerated the treatment quite well.  She began letrozole and is tolerating it quite well and has had no hot flashes, myalgias or arthralgias.  The plan was to continue letrozole for a total of 10 years of therapy.  She quit letrozole on her own about a year ago.  She  completed 9 years.  2.  Radha Uribe is now 10 years out from her primary diagnosis with ER-positive, DE-positive, HER-2 amplified invasive carcinoma of the left breast.  She has no signs or symptoms of recurrence, but did not want to come in for a breast exam.  She is willing to come in person for a breast exam and mammogram a year from now.  3.  I declined adjuvant Zometa.  4.  I advised her to have a screening CT scan, and she refused again.  I recommended a low dose chest CT to screen for lung cancer and she refused again today.   5.  Bone health. She declined a Dexa.  She does take vitamin D3 2000 international unit(s) and calcium.  6.  Type 2 diabetes.  She is off metformin now with good glucose control and is following up with her primary care provider.  She did not remember the name of her primary care provider today in the conversation.  6.  Covid vaccination.  She says that she has had Covid vaccination.  7.  Anemia.  She declined evaluation and refuses upper and lower endoscopy.    8.  I did commend her on her cessation of tobacco.  She said that she became concerned about the effects of tobacco and stop smoking.  Sinus drainage remains a problem.  She does not want consultation for this problem.  Her shortness of breath is a bit improved.  9.  She refused a DEXA scan to check on bone density.  10.  She did have a flu shot and has had Covid vaccination x 3.  I commended her on her vaccinations.  11.  I did recommend weightbearing exercises.  She did refuse a DEXA scan. I discussed my recommendations and rationale.  She will continue with calcium and vitamin D.   12.  Follow up.  Follow up visit with me in person December 17 with mammogram and CBC, CMP.      Thank you for allowing us to participate in this patient's care.      Sincerely,      South Esparza MD  Professor  [facility]  [phone]         7:43-7:50  I spent 7 minutes with the patient more than 50% of which was in counseling  and coordination of care

## 2023-12-20 DIAGNOSIS — I10 HYPERTENSION GOAL BP (BLOOD PRESSURE) < 140/90: ICD-10-CM

## 2023-12-20 RX ORDER — LOSARTAN POTASSIUM 100 MG/1
100 TABLET ORAL DAILY
Qty: 90 TABLET | Refills: 1 | Status: SHIPPED | OUTPATIENT
Start: 2023-12-20 | End: 2024-04-16

## 2024-03-05 DIAGNOSIS — E11.9 TYPE 2 DIABETES MELLITUS WITHOUT COMPLICATION, WITHOUT LONG-TERM CURRENT USE OF INSULIN (H): ICD-10-CM

## 2024-03-05 DIAGNOSIS — E78.00 HYPERCHOLESTEROLEMIA: ICD-10-CM

## 2024-03-06 RX ORDER — SIMVASTATIN 20 MG
20 TABLET ORAL DAILY
Qty: 90 TABLET | Refills: 1 | Status: SHIPPED | OUTPATIENT
Start: 2024-03-06 | End: 2024-04-16

## 2024-03-16 ENCOUNTER — HEALTH MAINTENANCE LETTER (OUTPATIENT)
Age: 74
End: 2024-03-16

## 2024-04-16 ENCOUNTER — OFFICE VISIT (OUTPATIENT)
Dept: FAMILY MEDICINE | Facility: CLINIC | Age: 74
End: 2024-04-16
Payer: COMMERCIAL

## 2024-04-16 VITALS
WEIGHT: 119 LBS | SYSTOLIC BLOOD PRESSURE: 154 MMHG | HEIGHT: 66 IN | OXYGEN SATURATION: 100 % | BODY MASS INDEX: 19.13 KG/M2 | RESPIRATION RATE: 16 BRPM | TEMPERATURE: 98.1 F | DIASTOLIC BLOOD PRESSURE: 64 MMHG | HEART RATE: 66 BPM

## 2024-04-16 DIAGNOSIS — Z12.11 SCREEN FOR COLON CANCER: Primary | ICD-10-CM

## 2024-04-16 DIAGNOSIS — I10 HYPERTENSION GOAL BP (BLOOD PRESSURE) < 140/90: ICD-10-CM

## 2024-04-16 DIAGNOSIS — C50.212 MALIGNANT NEOPLASM OF UPPER-INNER QUADRANT OF LEFT FEMALE BREAST, UNSPECIFIED ESTROGEN RECEPTOR STATUS (H): ICD-10-CM

## 2024-04-16 DIAGNOSIS — E78.00 HYPERCHOLESTEROLEMIA: ICD-10-CM

## 2024-04-16 DIAGNOSIS — E11.9 TYPE 2 DIABETES MELLITUS WITHOUT COMPLICATION, UNSPECIFIED WHETHER LONG TERM INSULIN USE (H): ICD-10-CM

## 2024-04-16 DIAGNOSIS — E11.9 TYPE 2 DIABETES MELLITUS WITHOUT COMPLICATION, WITHOUT LONG-TERM CURRENT USE OF INSULIN (H): ICD-10-CM

## 2024-04-16 LAB — HBA1C MFR BLD: 6.1 % (ref 0–5.6)

## 2024-04-16 PROCEDURE — 99214 OFFICE O/P EST MOD 30 MIN: CPT | Performed by: NURSE PRACTITIONER

## 2024-04-16 PROCEDURE — 99207 PR FOOT EXAM NO CHARGE: CPT | Performed by: NURSE PRACTITIONER

## 2024-04-16 PROCEDURE — 80061 LIPID PANEL: CPT | Performed by: NURSE PRACTITIONER

## 2024-04-16 PROCEDURE — 36415 COLL VENOUS BLD VENIPUNCTURE: CPT | Performed by: NURSE PRACTITIONER

## 2024-04-16 PROCEDURE — 83036 HEMOGLOBIN GLYCOSYLATED A1C: CPT | Performed by: NURSE PRACTITIONER

## 2024-04-16 RX ORDER — LOSARTAN POTASSIUM 100 MG/1
100 TABLET ORAL DAILY
Qty: 90 TABLET | Refills: 3 | Status: SHIPPED | OUTPATIENT
Start: 2024-04-16

## 2024-04-16 RX ORDER — HYDROCHLOROTHIAZIDE 12.5 MG/1
CAPSULE ORAL
Qty: 90 CAPSULE | Refills: 1 | Status: CANCELLED | OUTPATIENT
Start: 2024-04-16

## 2024-04-16 RX ORDER — SIMVASTATIN 20 MG
20 TABLET ORAL DAILY
Qty: 90 TABLET | Refills: 1 | Status: SHIPPED | OUTPATIENT
Start: 2024-04-16

## 2024-04-16 RX ORDER — HYDROCHLOROTHIAZIDE 25 MG/1
25 TABLET ORAL DAILY
Qty: 90 TABLET | Refills: 3 | Status: SHIPPED | OUTPATIENT
Start: 2024-04-16

## 2024-04-16 RX ORDER — METOPROLOL SUCCINATE 100 MG/1
100 TABLET, EXTENDED RELEASE ORAL DAILY
Qty: 90 TABLET | Refills: 3 | Status: SHIPPED | OUTPATIENT
Start: 2024-04-16

## 2024-04-16 ASSESSMENT — PAIN SCALES - GENERAL: PAINLEVEL: NO PAIN (0)

## 2024-04-16 NOTE — PROGRESS NOTES
Assessment & Plan   Problem List Items Addressed This Visit          Endocrine    Type 2 diabetes mellitus without complication, unspecified whether long term insulin use (H)    Relevant Medications    metFORMIN (GLUCOPHAGE) 500 MG tablet    Other Relevant Orders    Adult Eye  Referral    Lipid panel reflex to direct LDL Non-fasting    HEMOGLOBIN A1C (Completed)    Hypercholesterolemia    Relevant Medications    simvastatin (ZOCOR) 20 MG tablet       Other    Malignant neoplasm of upper-inner quadrant of left female breast, unspecified estrogen receptor status (H)     Other Visit Diagnoses       Screen for colon cancer    -  Primary    Relevant Orders    Colonoscopy Screening  Referral    Hypertension goal BP (blood pressure) < 140/90        Relevant Medications    losartan (COZAAR) 100 MG tablet    metoprolol succinate ER (TOPROL XL) 100 MG 24 hr tablet    hydrochlorothiazide (HYDRODIURIL) 25 MG tablet    Type 2 diabetes mellitus without complication, without long-term current use of insulin (H)        Relevant Medications    metFORMIN (GLUCOPHAGE) 500 MG tablet    simvastatin (ZOCOR) 20 MG tablet    Other Relevant Orders    FOOT EXAM (Completed)           Nicotine/Tobacco Cessation  She reports that she has been smoking cigarettes. She started smoking about 50 years ago. She has a 47 pack-year smoking history. She quit smokeless tobacco use about 3 years ago.  Nicotine/Tobacco Cessation Plan  Information offered: Patient not interested at this time      Regular exercise  See Patient Instructions      Gaudencio Garcia is a 74 year old, presenting for the following health issues:  Diabetes        4/16/2024     9:21 AM   Additional Questions   Roomed by Jerson JOHN     Diabetes Follow-up    How often are you checking your blood sugar? Not at all  What concerns do you have today about your diabetes? None   Do you have any of these symptoms? (Select all that apply)  No numbness or tingling in  "feet.  No redness, sores or blisters on feet.  No complaints of excessive thirst.  No reports of blurry vision.  No significant changes to weight.  Have you had a diabetic eye exam in the last 12 months? No; plans to get one this summer      Hyperlipidemia Follow-Up    Are you regularly taking any medication or supplement to lower your cholesterol?   Yes- simvastatin  Are you having muscle aches or other side effects that you think could be caused by your cholesterol lowering medication?  No    Hypertension Follow-up    Do you check your blood pressure regularly outside of the clinic? No   Are you following a low salt diet? No  Are your blood pressures ever more than 140 on the top number (systolic) OR more   than 90 on the bottom number (diastolic), for example 140/90? No    BP Readings from Last 2 Encounters:   04/16/24 (!) 154/64   12/12/23 (!) 158/67     Hemoglobin A1C (%)   Date Value   04/16/2024 6.1 (H)   08/01/2023 7.8 (H)   03/11/2021 6.3 (H)   08/20/2020 6.1 (H)     LDL Cholesterol Calculated (mg/dL)   Date Value   09/22/2021 98   08/20/2020 96   10/09/2019 91     LDL Cholesterol Direct (mg/dL)   Date Value   08/01/2023 134 (H)           Review of Systems  Constitutional, neuro, ENT, endocrine, pulmonary, cardiac, gastrointestinal, genitourinary, musculoskeletal, integument and psychiatric systems are negative, except as otherwise noted.      Objective    BP (!) 154/64 (BP Location: Left arm, Patient Position: Sitting, Cuff Size: Adult Regular)   Pulse 66   Temp 98.1  F (36.7  C) (Temporal)   Resp 16   Ht 1.676 m (5' 6\")   Wt 54 kg (119 lb)   LMP  (LMP Unknown)   SpO2 100%   BMI 19.21 kg/m    Body mass index is 19.21 kg/m .  Physical Exam   GENERAL: alert and no distress  EYES: Eyes grossly normal to inspection, PERRL and conjunctivae and sclerae normal  NECK: no adenopathy, no asymmetry, masses, or scars  RESP: lungs clear to auscultation - no rales, rhonchi or wheezes  CV: regular rate and " rhythm, normal S1 S2, no S3 or S4, no murmur, click or rub, no peripheral edema  ABDOMEN: soft, nontender, no hepatosplenomegaly, no masses and bowel sounds normal  MS: no gross musculoskeletal defects noted, no edema  SKIN: no suspicious lesions or rashes  Diabetic foot exam: normal DP and PT pulses, no trophic changes or ulcerative lesions, normal sensory exam, normal monofilament exam, and nail exam onychomycosis of the toenails    Results for orders placed or performed in visit on 04/16/24 (from the past 24 hour(s))   HEMOGLOBIN A1C   Result Value Ref Range    Hemoglobin A1C 6.1 (H) 0.0 - 5.6 %           Signed Electronically by: NATHAN Millan CNP

## 2024-04-17 LAB
CHOLEST SERPL-MCNC: 159 MG/DL
FASTING STATUS PATIENT QL REPORTED: YES
HDLC SERPL-MCNC: 44 MG/DL
LDLC SERPL CALC-MCNC: 86 MG/DL
NONHDLC SERPL-MCNC: 115 MG/DL
TRIGL SERPL-MCNC: 143 MG/DL

## 2024-09-17 NOTE — TELEPHONE ENCOUNTER
Depression Screening and Follow-up Plan: Patient was screened for depression during today's encounter. They screened negative with a PHQ-2 score of 0.        Assessment/Plan:    No problem-specific Assessment & Plan notes found for this encounter.       Diagnoses and all orders for this visit:    Diabetes mellitus due to underlying condition with hyperosmolarity without coma, without long-term current use of insulin (HCC)  -     atorvastatin (LIPITOR) 10 mg tablet; Take 1 tablet (10 mg total) by mouth every evening  -     Hemoglobin A1C; Future  -     Comprehensive metabolic panel; Future  -     Albumin / creatinine urine ratio; Future  -     Hemoglobin A1C; Future  -     Continuous Glucose Sensor (Dexcom G7 Sensor); Use 1 Device every 10 days  -     metFORMIN (GLUCOPHAGE) 500 mg tablet; Take 1 tablet (500 mg total) by mouth 2 (two) times a day with meals  -     Empagliflozin 25 MG TABS; Take 1 tablet (25 mg total) by mouth daily    Pure hypercholesterolemia  -     Lipid panel; Future    Primary insomnia  -     eszopiclone (LUNESTA) 1 mg tablet; Take 1 tablet (1 mg total) by mouth daily at bedtime as needed for sleep Take immediately before bedtime    Erectile dysfunction due to arterial insufficiency  -     sildenafil (Viagra) 100 mg tablet; Take 1 tablet (100 mg total) by mouth daily as needed for erectile dysfunction    Other orders  -     Discontinue: metFORMIN (GLUCOPHAGE) 500 mg tablet; Take 500 mg by mouth          Subjective:      Patient ID: Grady Alvarez is a 60 y.o. male.    New pt labs  8/23/24:  -A1c   11   ldl  150    Carb /low lipid diet  reviewed    Er  started on metformin 500 bid    Rec statin    Bs home=190-200    Nocturia  q  1  hr  not known diabetic  Mom  diabetic  Diet  changed  less carbs and no  soda water  Metformin in er  Decom 7 friend              The following portions of the patient's history were reviewed and updated as appropriate: allergies, current medications, past family  [facility]ob[facility] Program at the [facility] attempted to reach Ms. Uribe on 7/12/2019 regarding the tobacco cessation program to help Ms. Uribe to quit smoking. We will attempt to reach Ms. Uribe another time.      "history, past medical history, past social history, past surgical history, and problem list.    Review of Systems   Constitutional:  Negative for activity change and fatigue.   HENT:  Negative for ear discharge, ear pain, rhinorrhea and sore throat.    Eyes:  Negative for pain and visual disturbance.   Respiratory:  Negative for cough and shortness of breath.    Cardiovascular:  Negative for chest pain and leg swelling.   Gastrointestinal:  Negative for abdominal pain, constipation and diarrhea.   Endocrine: Negative for cold intolerance and polyuria.   Genitourinary:  Negative for flank pain and hematuria.   Musculoskeletal:  Negative for back pain and joint swelling.   Skin:  Negative for pallor and wound.   Neurological:  Negative for dizziness, seizures and speech difficulty.   Psychiatric/Behavioral:  Negative for confusion and hallucinations.          Objective:      /70   Pulse 78   Temp 97.8 °F (36.6 °C)   Resp 12   Ht 5' 11\" (1.803 m)   Wt 91.6 kg (202 lb)   SpO2 98%   BMI 28.17 kg/m²          Physical Exam  Vitals and nursing note reviewed.   Constitutional:       General: He is not in acute distress.     Appearance: Normal appearance. He is not ill-appearing.   HENT:      Head: Normocephalic.      Right Ear: External ear normal. There is no impacted cerumen.      Left Ear: External ear normal. There is no impacted cerumen.      Nose: No congestion or rhinorrhea.      Mouth/Throat:      Pharynx: No posterior oropharyngeal erythema.   Eyes:      General: No scleral icterus.        Right eye: No discharge.         Left eye: No discharge.   Neck:      Vascular: No carotid bruit.   Cardiovascular:      Rate and Rhythm: Normal rate and regular rhythm.      Pulses: no weak pulses.           Dorsalis pedis pulses are 2+ on the right side and 2+ on the left side.      Heart sounds: Normal heart sounds. No murmur heard.     No friction rub. No gallop.   Pulmonary:      Breath sounds: No wheezing or " rhonchi.   Abdominal:      General: There is no distension.      Tenderness: There is no abdominal tenderness. There is no guarding.   Musculoskeletal:         General: No swelling.      Cervical back: No rigidity.      Right lower leg: No edema.      Left lower leg: No edema.   Feet:      Right foot:      Skin integrity: No ulcer, skin breakdown, erythema, warmth, callus or dry skin.      Left foot:      Skin integrity: No ulcer, skin breakdown, erythema, warmth, callus or dry skin.   Lymphadenopathy:      Cervical: No cervical adenopathy.   Skin:     Coloration: Skin is not jaundiced.   Neurological:      Mental Status: He is alert.      Cranial Nerves: No cranial nerve deficit.      Motor: No weakness.      Coordination: Coordination normal.   Psychiatric:         Mood and Affect: Mood normal.       Diabetic Foot Exam    Patient's shoes and socks removed.    Right Foot/Ankle   Right Foot Inspection  Skin Exam: skin normal and skin intact. No dry skin, no warmth, no callus, no erythema, no maceration, no abnormal color, no pre-ulcer, no ulcer and no callus.     Toe Exam: ROM and strength within normal limits.     Sensory   Monofilament testing: intact    Vascular  The right DP pulse is 2+.     Left Foot/Ankle  Left Foot Inspection  Skin Exam: skin normal and skin intact. No dry skin, no warmth, no erythema, no maceration, normal color, no pre-ulcer, no ulcer and no callus.     Toe Exam: ROM and strength within normal limits.     Sensory   Monofilament testing: intact    Vascular  The left DP pulse is 2+.     Assign Risk Category  No deformity present  No loss of protective sensation  No weak pulses  Risk: 0

## 2024-10-12 ENCOUNTER — HEALTH MAINTENANCE LETTER (OUTPATIENT)
Age: 74
End: 2024-10-12

## 2024-12-15 NOTE — PROGRESS NOTES
HISTORY OF PRESENT ILLNESS:   Radha Uribe is a 72-year-old woman who was referred to our Oncology Clinic by Dr. Crespo for evaluation of a newly diagnosed breast cancer. Radha was in her usual health until last month, when she was asked to do a mammogram by her primary care doctor, Dr. Preston. She has not been doing mammogram for the last 20 years, and most of the time she does self breast examinations. She noticed a mass, which was not painful, in her left breast, and immediately went for a mammogram. The mammogram was done on 09/13/2013, which revealed a 2.5 x 2 x 0.8 cm mass, so she had an ultrasound-guided core needle biopsy on 09/17, which revealed infiltrating ductal carcinoma, [place] grade 3 (score 3 for tubule formation, 3 for nuclear atypia, 3 for mitosis). No angiolymphatic invasion was identified. There was a focal necrosis of invasive carcinoma identified, and no definite carcinoma in situ was identified. The greatest length of the needle biopsy at the left breast in the 2 o'clock position showed benign breast tissue with fibrocystic changes. There is a focal intraductal microcalcification identified. The tumor was 15-20% weakly to moderately positive for estrogen receptors and 5% positive for progesterone receptors. HER-2 was amplified. The ratio of HER-2/CEP17 signals were more than 5.9 by FISH analysis. The signal/nucleus was more than 19.4. She was seen by Dr. Crespo at the [facility] on 09/24/13, who recommended to do an MRI of the breast due to density of the breast tissues, as well as referred her to the Breast Oncology Clinic for further staging workup and management of her breast cancer. MRI of the breast showed BI-RADS 6 and mass in the left breast at the 11 o'clock position that measures about 1.6 x 1.6 x 1.6 cm. The longest diameter in sagittal reconstruction was 1.9 cm. There is no evidence of axillary lymph node involvement on the MRI.   She had neoadjuvant chemotherapy , mastectomy and  SNL sampling which showed pathologic CR and no disease in the LN. This was followed by radiation therapy. She received 5000 cGy in 25 fractions, completed on 5/23/2014. She continued on treatment with herceptin for 13 cycles. This was completed on 12/9/2014.     TREATMENT SUMMARY:  A. Diagnosis in 2013.  The tumor was 15-20% weakly to moderately positive for estrogen receptors and 5% positive for progesterone receptors. HER-2 was amplified.  Path CR.  Neoadjuvant chemotherapy.    B.  Lumpectomy.  Radiation.  C.  Adjuvant Herceptin to complete 1 year.  Adjuvant letrozole.  D.  Continue adjuvant letrozole to complete 10 years thorough January, 2024 but she stopped it early in 2023 at 9 years.  E.  Covid vaccination x3 has been completed, flu shot completed.      FOLLOWUP NOTE       INTERVAL HISTORY:   Radha feels generally well.  She has no pain, fatigue, depression, but has mild anxiety.  She did start smoking again about 2 months ago and plans to stop smoking sometime in the next year.  She does concerns about tobacco and lung cancer risk.  She does not want screening chest CT.  She does not want evaluation of the renal dysfunction.  She does have anemia and I did offer a upper and lower endoscopy but she does not want these performed.    She does not want a DEXA scan.  She said she wants the bare minimum in terms of follow-up.  She does have an internal medicine physician at [place] that is following.     REVIEW OF SYSTEMS:  She denies fevers or chills, cough, chest pain, shortness of breath, nausea, vomiting, constipation, diarrhea, she does have chronic back pain.  She denies headache.  A 10 point review of systems was otherwise negative.       She is eating a healthful diet.  She does not have an exercise program.  She is doing some walking.  She does take vitamin D 2000 international units daily as well as calcium daily.  She declined a screening chest CT scan to screen for lung cancer, she declined a DEXA  scan to check bone density, she has declined upper and lower endoscopy because of borderline anemia, she declined adjuvant zoledronic acid.  She has had COVID vaccination.        PHYSICAL EXAMINATION:    /70 (BP Location: Left arm, Patient Position: Sitting, Cuff Size: Adult Small)   Pulse 78   Temp 97.8  F (36.6  C) (Oral)   Resp 16   Wt 49 kg (108 lb)   LMP  (LMP Unknown)   SpO2 96%   BMI 17.43 kg/m    GENERAL:  Radha appeared generally well.  She has no alopecia.   HEENT:  Oropharynx is without lesions.   LYMPH:  There is no palpable cervical, supraclavicular, subclavicular or axillary lymphadenopathy.   BREASTS:  Right breast reveals no masses.  Examination of the left breast reveals a well-healed incision 2 fingerbreadths from the nipple-areolar complex at the 11-o'clock position without erythema or masses.  No masses in the left breast.  The left axillary incision is well healed without erythema or masses.   LUNGS:  Clear to percussion and auscultation.   HEART:  Regular rate and rhythm with a 2/6 systolic murmur at the left sternal border radiating to the apex.   ABDOMEN:  Soft and nontender without hepatosplenomegaly.   EXTREMITIES:  Without edema.   PSYCHIATRIC:  Mood and affect were normal.      LABORATORY DATA:   CMP was 3 calculated GFR of 51 mL/min.  This is a new finding for her.  Her CBC showed WBC of 9.7 hemoglobin 10.7 and platelets 269,000.  Absolute neutrophil count 6000.     ASSESSMENT AND PLAN:   Radha Uribe is a 73-year-old woman who was diagnosed in 2013 with a stage IIA, T2N2MX, invasive ductal carcinoma of the left breast.  Tumor was positive for estrogen receptor in 15%-20% of the cells, RI positive in 5%, HER2 was amplified.  Breast mass measured 2.5 x 2 x 0.8 cm in size at 11-o'clock position.  She was treated with neoadjuvant Taxol and Herceptin for 12 weeks followed by dose-dense AC for 4 cycles.  She had a pathologic complete response in the lumpectomy specimen.  She  completed radiation in 04/2014.  She tolerated the treatment quite well.  She began letrozole and is tolerating it quite well and has had no hot flashes, myalgias or arthralgias.  The plan was to continue letrozole for a total of 10 years of therapy.  She quit letrozole on her own 2 years ago.  She completed 9 years of adjuvant hormonal therapy.  Radha Uribe is now 11 years out from her primary diagnosis with ER-positive, [place]-positive, HER-2 amplified invasive carcinoma of the left breast.  She has no signs or symptoms of recurrence, but did not want to come in for a breast exam.  She is willing to come in person for a breast exam and mammogram a year from now.  Chronic kidney disease.  She does not want evaluation at this time.  Anemia.  He refused upper and lower endoscopy.  I declined adjuvant Zometa.  I advised her to have a screening CT scan, and she refused again.  I recommended a low dose chest CT to screen for lung cancer and she refused again today.   Bone health. She declined a Dexa.  She does take vitamin D3 2000 international unit(s) and calcium.  Type 2 diabetes.  She is off metformin now with good glucose control and is following up with her primary care provider.  She did not remember the name of her primary care provider today in the conversation.  Covid vaccination.  She says that she has had Covid vaccination.  Anemia.  She declined evaluation and refuses upper and lower endoscopy.   She refused a DEXA scan to check on bone density.  Has had at least 3 COVID summation.  I did recommend weightbearing exercises.  She did refuse a DEXA scan. I discussed my recommendations and rationale.  She will continue with calcium and vitamin D.   Follow up.  Follow up visit with me in person December 23 with mammogram and CBC, CMP.      Thank you for allowing us to participate in this patient's care.      Sincerely,      South Esparza MD  Professor  [facility]  [phone]           I spent 35  minutes with the patient more than 50% of which was in counseling and coordination of care

## 2024-12-17 ENCOUNTER — ONCOLOGY VISIT (OUTPATIENT)
Dept: ONCOLOGY | Facility: CLINIC | Age: 74
End: 2024-12-17
Attending: INTERNAL MEDICINE
Payer: COMMERCIAL

## 2024-12-17 ENCOUNTER — ANCILLARY PROCEDURE (OUTPATIENT)
Dept: MAMMOGRAPHY | Facility: CLINIC | Age: 74
End: 2024-12-17
Payer: COMMERCIAL

## 2024-12-17 VITALS
DIASTOLIC BLOOD PRESSURE: 70 MMHG | OXYGEN SATURATION: 96 % | WEIGHT: 108 LBS | BODY MASS INDEX: 17.43 KG/M2 | TEMPERATURE: 97.8 F | HEART RATE: 78 BPM | SYSTOLIC BLOOD PRESSURE: 134 MMHG | RESPIRATION RATE: 16 BRPM

## 2024-12-17 DIAGNOSIS — Z12.31 ENCOUNTER FOR SCREENING MAMMOGRAM FOR BREAST CANCER: ICD-10-CM

## 2024-12-17 DIAGNOSIS — C50.212 MALIGNANT NEOPLASM OF UPPER-INNER QUADRANT OF LEFT FEMALE BREAST, UNSPECIFIED ESTROGEN RECEPTOR STATUS (H): Primary | ICD-10-CM

## 2024-12-17 LAB
ALBUMIN SERPL BCG-MCNC: 4.3 G/DL (ref 3.5–5.2)
ALP SERPL-CCNC: 52 U/L (ref 40–150)
ALT SERPL W P-5'-P-CCNC: 11 U/L (ref 0–50)
ANION GAP SERPL CALCULATED.3IONS-SCNC: 13 MMOL/L (ref 7–15)
AST SERPL W P-5'-P-CCNC: 22 U/L (ref 0–45)
BASOPHILS # BLD AUTO: 0.1 10E3/UL (ref 0–0.2)
BASOPHILS NFR BLD AUTO: 1 %
BILIRUB SERPL-MCNC: 0.3 MG/DL
BUN SERPL-MCNC: 25.2 MG/DL (ref 8–23)
CALCIUM SERPL-MCNC: 9.9 MG/DL (ref 8.8–10.4)
CHLORIDE SERPL-SCNC: 97 MMOL/L (ref 98–107)
CREAT SERPL-MCNC: 1.13 MG/DL (ref 0.51–0.95)
EGFRCR SERPLBLD CKD-EPI 2021: 51 ML/MIN/1.73M2
EOSINOPHIL # BLD AUTO: 0.2 10E3/UL (ref 0–0.7)
EOSINOPHIL NFR BLD AUTO: 2 %
ERYTHROCYTE [DISTWIDTH] IN BLOOD BY AUTOMATED COUNT: 12.5 % (ref 10–15)
GLUCOSE SERPL-MCNC: 103 MG/DL (ref 70–99)
HCO3 SERPL-SCNC: 27 MMOL/L (ref 22–29)
HCT VFR BLD AUTO: 31.6 % (ref 35–47)
HGB BLD-MCNC: 10.7 G/DL (ref 11.7–15.7)
IMM GRANULOCYTES # BLD: 0 10E3/UL
IMM GRANULOCYTES NFR BLD: 0 %
LYMPHOCYTES # BLD AUTO: 2.5 10E3/UL (ref 0.8–5.3)
LYMPHOCYTES NFR BLD AUTO: 26 %
MCH RBC QN AUTO: 30.8 PG (ref 26.5–33)
MCHC RBC AUTO-ENTMCNC: 33.9 G/DL (ref 31.5–36.5)
MCV RBC AUTO: 91 FL (ref 78–100)
MONOCYTES # BLD AUTO: 0.8 10E3/UL (ref 0–1.3)
MONOCYTES NFR BLD AUTO: 8 %
NEUTROPHILS # BLD AUTO: 6 10E3/UL (ref 1.6–8.3)
NEUTROPHILS NFR BLD AUTO: 62 %
NRBC # BLD AUTO: 0 10E3/UL
NRBC BLD AUTO-RTO: 0 /100
PLATELET # BLD AUTO: 269 10E3/UL (ref 150–450)
POTASSIUM SERPL-SCNC: 4.1 MMOL/L (ref 3.4–5.3)
PROT SERPL-MCNC: 6.9 G/DL (ref 6.4–8.3)
RBC # BLD AUTO: 3.47 10E6/UL (ref 3.8–5.2)
SODIUM SERPL-SCNC: 137 MMOL/L (ref 135–145)
WBC # BLD AUTO: 9.7 10E3/UL (ref 4–11)

## 2024-12-17 PROCEDURE — 82040 ASSAY OF SERUM ALBUMIN: CPT | Performed by: INTERNAL MEDICINE

## 2024-12-17 PROCEDURE — 85004 AUTOMATED DIFF WBC COUNT: CPT | Performed by: INTERNAL MEDICINE

## 2024-12-17 PROCEDURE — 77063 BREAST TOMOSYNTHESIS BI: CPT | Performed by: STUDENT IN AN ORGANIZED HEALTH CARE EDUCATION/TRAINING PROGRAM

## 2024-12-17 PROCEDURE — 36415 COLL VENOUS BLD VENIPUNCTURE: CPT | Performed by: INTERNAL MEDICINE

## 2024-12-17 PROCEDURE — 99214 OFFICE O/P EST MOD 30 MIN: CPT | Performed by: INTERNAL MEDICINE

## 2024-12-17 PROCEDURE — 85041 AUTOMATED RBC COUNT: CPT | Performed by: INTERNAL MEDICINE

## 2024-12-17 PROCEDURE — 77067 SCR MAMMO BI INCL CAD: CPT | Performed by: STUDENT IN AN ORGANIZED HEALTH CARE EDUCATION/TRAINING PROGRAM

## 2024-12-17 PROCEDURE — 99213 OFFICE O/P EST LOW 20 MIN: CPT | Performed by: INTERNAL MEDICINE

## 2024-12-17 ASSESSMENT — PAIN SCALES - GENERAL: PAINLEVEL_OUTOF10: NO PAIN (0)

## 2024-12-17 NOTE — LETTER
12/17/2024      Radha Uribe  [address]  [facility] [address]      Dear Colleague,    Thank you for referring your patient, Radha Uribe, to the [facility]. Please see a copy of my visit note below.    HISTORY OF PRESENT ILLNESS:   Radha Uribe is a 72-year-old woman who was referred to our Oncology Clinic by Dr. Crespo for evaluation of a newly diagnosed breast cancer. Radha was in her usual health until last month, when she was asked to do a mammogram by her primary care doctor, Dr. Preston. She has not been doing mammogram for the last 20 years, and most of the time she does self breast examinations. She noticed a mass, which was not painful, in her left breast, and immediately went for a mammogram. The mammogram was done on 09/13/2013, which revealed a 2.5 x 2 x 0.8 cm mass, so she had an ultrasound-guided core needle biopsy on 09/17, which revealed infiltrating ductal carcinoma, [place] grade 3 (score 3 for tubule formation, 3 for nuclear atypia, 3 for mitosis). No angiolymphatic invasion was identified. There was a focal necrosis of invasive carcinoma identified, and no definite carcinoma in situ was identified. The greatest length of the needle biopsy at the left breast in the 2 o'clock position showed benign breast tissue with fibrocystic changes. There is a focal intraductal microcalcification identified. The tumor was 15-20% weakly to moderately positive for estrogen receptors and 5% positive for progesterone receptors. HER-2 was amplified. The ratio of HER-2/CEP17 signals were more than 5.9 by FISH analysis. The signal/nucleus was more than 19.4. She was seen by Dr. Crepso at the Breast Clinic on 09/24/13, who recommended to do an MRI of the breast due to density of the breast tissues, as well as referred her to the Breast Oncology Clinic for further staging workup and management of her breast cancer. MRI of the breast showed BI-RADS 6 and mass in the left  breast at the 11 o'clock position that measures about 1.6 x 1.6 x 1.6 cm. The longest diameter in sagittal reconstruction was 1.9 cm. There is no evidence of axillary lymph node involvement on the MRI.   She had neoadjuvant chemotherapy , mastectomy and SNL sampling which showed pathologic CR and no disease in the LN. This was followed by radiation therapy. She received 5000 cGy in 25 fractions, completed on 5/23/2014. She continued on treatment with herceptin for 13 cycles. This was completed on 12/9/2014.     TREATMENT SUMMARY:  A. Diagnosis in 2013.  The tumor was 15-20% weakly to moderately positive for estrogen receptors and 5% positive for progesterone receptors. HER-2 was amplified.  Path CR.  Neoadjuvant chemotherapy.    B.  Lumpectomy.  Radiation.  C.  Adjuvant Herceptin to complete 1 year.  Adjuvant letrozole.  D.  Continue adjuvant letrozole to complete 10 years thorough January, 2024 but she stopped it early in 2023 at 9 years.  E.  Covid vaccination x3 has been completed, flu shot completed.      FOLLOWUP NOTE       INTERVAL HISTORY:   Radha feels generally well.  She has no pain, fatigue, depression, but has mild anxiety.  She did start smoking again about 2 months ago and plans to stop smoking sometime in the next year.  She does concerns about tobacco and lung cancer risk.  She does not want screening chest CT.  She does not want evaluation of the renal dysfunction.  She does have anemia and I did offer a upper and lower endoscopy but she does not want these performed.    She does not want a DEXA scan.  She said she wants the bare minimum in terms of follow-up.  She does have an internal medicine physician at [place] that is following.     REVIEW OF SYSTEMS:  She denies fevers or chills, cough, chest pain, shortness of breath, nausea, vomiting, constipation, diarrhea, she does have chronic back pain.  She denies headache.  A 10 point review of systems was otherwise negative.       She is eating a  healthful diet.  She does not have an exercise program.  She is doing some walking.  She does take vitamin D 2000 international units daily as well as calcium daily.  She declined a screening chest CT scan to screen for lung cancer, she declined a DEXA scan to check bone density, she has declined upper and lower endoscopy because of borderline anemia, she declined adjuvant zoledronic acid.  She has had COVID vaccination.        PHYSICAL EXAMINATION:    /70 (BP Location: Left arm, Patient Position: Sitting, Cuff Size: Adult Small)   Pulse 78   Temp 97.8  F (36.6  C) (Oral)   Resp 16   Wt 49 kg (108 lb)   LMP  (LMP Unknown)   SpO2 96%   BMI 17.43 kg/m    GENERAL:  Radha appeared generally well.  She has no alopecia.   HEENT:  Oropharynx is without lesions.   LYMPH:  There is no palpable cervical, supraclavicular, subclavicular or axillary lymphadenopathy.   BREASTS:  Right breast reveals no masses.  Examination of the left breast reveals a well-healed incision 2 fingerbreadths from the nipple-areolar complex at the 11-o'clock position without erythema or masses.  No masses in the left breast.  The left axillary incision is well healed without erythema or masses.   LUNGS:  Clear to percussion and auscultation.   HEART:  Regular rate and rhythm with a 2/6 systolic murmur at the left sternal border radiating to the apex.   ABDOMEN:  Soft and nontender without hepatosplenomegaly.   EXTREMITIES:  Without edema.   PSYCHIATRIC:  Mood and affect were normal.      LABORATORY DATA:   CMP was 3 calculated GFR of 51 mL/min.  This is a new finding for her.  Her CBC showed WBC of 9.7 hemoglobin 10.7 and platelets 269,000.  Absolute neutrophil count 6000.     ASSESSMENT AND PLAN:   Radha Uribe is a 73-year-old woman who was diagnosed in 2013 with a stage IIA, T2N2MX, invasive ductal carcinoma of the left breast.  Tumor was positive for estrogen receptor in 15%-20% of the cells, [place] positive in 5%, HER2 was amplified.   Breast mass measured 2.5 x 2 x 0.8 cm in size at 11-o'clock position.  She was treated with neoadjuvant Taxol and Herceptin for 12 weeks followed by dose-dense AC for 4 cycles.  She had a pathologic complete response in the lumpectomy specimen.  She completed radiation in 04/2014.  She tolerated the treatment quite well.  She began letrozole and is tolerating it quite well and has had no hot flashes, myalgias or arthralgias.  The plan was to continue letrozole for a total of 10 years of therapy.  She quit letrozole on her own 2 years ago.  She completed 9 years of adjuvant hormonal therapy.  Radha Uribe is now 11 years out from her primary diagnosis with ER-positive, [place]-positive, HER-2 amplified invasive carcinoma of the left breast.  She has no signs or symptoms of recurrence, but did not want to come in for a breast exam.  She is willing to come in person for a breast exam and mammogram a year from now.  Chronic kidney disease.  She does not want evaluation at this time.  Anemia.  He refused upper and lower endoscopy.  I declined adjuvant Zometa.  I advised her to have a screening CT scan, and she refused again.  I recommended a low dose chest CT to screen for lung cancer and she refused again today.   Bone health. She declined a Dexa.  She does take vitamin D3 2000 international unit(s) and calcium.  Type 2 diabetes.  She is off metformin now with good glucose control and is following up with her primary care provider.  She did not remember the name of her primary care provider today in the conversation.  Covid vaccination.  She says that she has had Covid vaccination.  Anemia.  She declined evaluation and refuses upper and lower endoscopy.   She refused a DEXA scan to check on bone density.  Has had at least 3 COVID summation.  I did recommend weightbearing exercises.  She did refuse a DEXA scan. I discussed my recommendations and rationale.  She will continue with calcium and vitamin D.   Follow up.  Follow  up visit with me in person December 23 with mammogram and CBC, CMP.      Thank you for allowing us to participate in this patient's care.      Sincerely,      South Esparza MD  Professor  [facility]  [phone]           I spent 35 minutes with the patient more than 50% of which was in counseling and coordination of care      Again, thank you for allowing me to participate in the care of your patient.        Sincerely,        South Esparza MD

## 2024-12-17 NOTE — NURSING NOTE
"Oncology Rooming Note    December 17, 2024 11:34 AM   Radha Uribe is a 74 year old female who presents for:    Chief Complaint   Patient presents with    Labs Only     Labs drawn via  by RN.     Oncology Clinic Visit     Malignant neoplasm of upper-inner quadrant of left female breast, unspecified estrogen receptor status     Initial Vitals: /70 (BP Location: Left arm, Patient Position: Sitting, Cuff Size: Adult Small)   Pulse 78   Temp 97.8  F (36.6  C) (Oral)   Resp 16   Wt 49 kg (108 lb)   LMP  (LMP Unknown)   SpO2 96%   BMI 17.43 kg/m   Estimated body mass index is 17.43 kg/m  as calculated from the following:    Height as of 4/16/24: 1.676 m (5' 6\").    Weight as of this encounter: 49 kg (108 lb). Body surface area is 1.51 meters squared.  No Pain (0) Comment: Data Unavailable   No LMP recorded (lmp unknown). Patient has had a hysterectomy.  Allergies reviewed: Yes  Medications reviewed: Yes    Medications: Medication refills not needed today.  Pharmacy name entered into [facility]: [place], [place] - [address]    Frailty Screening:   Is the patient here for a new oncology consult visit in cancer care? 2. No      Clinical concerns: none.       Sukhdev Vivas"

## 2024-12-21 ENCOUNTER — HEALTH MAINTENANCE LETTER (OUTPATIENT)
Age: 74
End: 2024-12-21

## 2025-01-16 DIAGNOSIS — E11.9 TYPE 2 DIABETES MELLITUS WITHOUT COMPLICATION, WITHOUT LONG-TERM CURRENT USE OF INSULIN (H): ICD-10-CM

## 2025-01-16 DIAGNOSIS — E78.00 HYPERCHOLESTEROLEMIA: ICD-10-CM

## 2025-01-16 RX ORDER — SIMVASTATIN 20 MG
20 TABLET ORAL DAILY
Qty: 90 TABLET | Refills: 0 | Status: SHIPPED | OUTPATIENT
Start: 2025-01-16

## 2025-06-02 ENCOUNTER — OFFICE VISIT (OUTPATIENT)
Dept: FAMILY MEDICINE | Facility: CLINIC | Age: 75
End: 2025-06-02
Payer: COMMERCIAL

## 2025-06-02 VITALS
WEIGHT: 112.5 LBS | RESPIRATION RATE: 18 BRPM | DIASTOLIC BLOOD PRESSURE: 70 MMHG | OXYGEN SATURATION: 100 % | TEMPERATURE: 97.9 F | SYSTOLIC BLOOD PRESSURE: 130 MMHG | HEART RATE: 74 BPM | HEIGHT: 66 IN | BODY MASS INDEX: 18.08 KG/M2

## 2025-06-02 DIAGNOSIS — E11.9 TYPE 2 DIABETES MELLITUS WITHOUT COMPLICATION, WITHOUT LONG-TERM CURRENT USE OF INSULIN (H): ICD-10-CM

## 2025-06-02 DIAGNOSIS — E11.69 TYPE 2 DIABETES MELLITUS WITH OTHER SPECIFIED COMPLICATION, WITHOUT LONG-TERM CURRENT USE OF INSULIN (H): ICD-10-CM

## 2025-06-02 DIAGNOSIS — E78.00 HYPERCHOLESTEROLEMIA: ICD-10-CM

## 2025-06-02 DIAGNOSIS — C50.212 CARCINOMA OF UPPER-INNER QUADRANT OF LEFT BREAST IN FEMALE, ESTROGEN RECEPTOR POSITIVE (H): ICD-10-CM

## 2025-06-02 DIAGNOSIS — Z17.0 CARCINOMA OF UPPER-INNER QUADRANT OF LEFT BREAST IN FEMALE, ESTROGEN RECEPTOR POSITIVE (H): ICD-10-CM

## 2025-06-02 DIAGNOSIS — E11.9 TYPE 2 DIABETES MELLITUS WITHOUT COMPLICATION, UNSPECIFIED WHETHER LONG TERM INSULIN USE (H): Primary | ICD-10-CM

## 2025-06-02 DIAGNOSIS — Z13.6 SCREENING FOR CARDIOVASCULAR CONDITION: ICD-10-CM

## 2025-06-02 DIAGNOSIS — I10 HYPERTENSION GOAL BP (BLOOD PRESSURE) < 140/90: ICD-10-CM

## 2025-06-02 LAB
CHOLEST SERPL-MCNC: 188 MG/DL
CREAT UR-MCNC: 297 MG/DL
EST. AVERAGE GLUCOSE BLD GHB EST-MCNC: 146 MG/DL
FASTING STATUS PATIENT QL REPORTED: NO
HBA1C MFR BLD: 6.7 % (ref 0–5.6)
HDLC SERPL-MCNC: 48 MG/DL
LDLC SERPL CALC-MCNC: 107 MG/DL
MICROALBUMIN UR-MCNC: 68.2 MG/L
MICROALBUMIN/CREAT UR: 22.96 MG/G CR (ref 0–25)
NONHDLC SERPL-MCNC: 140 MG/DL
TRIGL SERPL-MCNC: 163 MG/DL

## 2025-06-02 PROCEDURE — 82570 ASSAY OF URINE CREATININE: CPT | Performed by: NURSE PRACTITIONER

## 2025-06-02 PROCEDURE — 83036 HEMOGLOBIN GLYCOSYLATED A1C: CPT | Performed by: NURSE PRACTITIONER

## 2025-06-02 PROCEDURE — 1125F AMNT PAIN NOTED PAIN PRSNT: CPT | Performed by: NURSE PRACTITIONER

## 2025-06-02 PROCEDURE — 80061 LIPID PANEL: CPT | Performed by: NURSE PRACTITIONER

## 2025-06-02 PROCEDURE — 82043 UR ALBUMIN QUANTITATIVE: CPT | Performed by: NURSE PRACTITIONER

## 2025-06-02 PROCEDURE — 3078F DIAST BP <80 MM HG: CPT | Performed by: NURSE PRACTITIONER

## 2025-06-02 PROCEDURE — G2211 COMPLEX E/M VISIT ADD ON: HCPCS | Performed by: NURSE PRACTITIONER

## 2025-06-02 PROCEDURE — 3044F HG A1C LEVEL LT 7.0%: CPT | Performed by: NURSE PRACTITIONER

## 2025-06-02 PROCEDURE — 36415 COLL VENOUS BLD VENIPUNCTURE: CPT | Performed by: NURSE PRACTITIONER

## 2025-06-02 PROCEDURE — 99214 OFFICE O/P EST MOD 30 MIN: CPT | Performed by: NURSE PRACTITIONER

## 2025-06-02 PROCEDURE — 3075F SYST BP GE 130 - 139MM HG: CPT | Performed by: NURSE PRACTITIONER

## 2025-06-02 RX ORDER — HYDROCHLOROTHIAZIDE 25 MG/1
25 TABLET ORAL DAILY
Qty: 90 TABLET | Refills: 3 | Status: SHIPPED | OUTPATIENT
Start: 2025-06-02

## 2025-06-02 RX ORDER — SIMVASTATIN 20 MG
20 TABLET ORAL DAILY
Qty: 90 TABLET | Refills: 0 | Status: SHIPPED | OUTPATIENT
Start: 2025-06-02 | End: 2025-06-03 | Stop reason: DRUGHIGH

## 2025-06-02 RX ORDER — METOPROLOL SUCCINATE 100 MG/1
100 TABLET, EXTENDED RELEASE ORAL DAILY
Qty: 90 TABLET | Refills: 3 | Status: SHIPPED | OUTPATIENT
Start: 2025-06-02

## 2025-06-02 RX ORDER — LOSARTAN POTASSIUM 100 MG/1
100 TABLET ORAL DAILY
Qty: 90 TABLET | Refills: 3 | Status: SHIPPED | OUTPATIENT
Start: 2025-06-02

## 2025-06-02 ASSESSMENT — PAIN SCALES - GENERAL: PAINLEVEL_OUTOF10: MODERATE PAIN (4)

## 2025-06-02 ASSESSMENT — PATIENT HEALTH QUESTIONNAIRE - PHQ9
SUM OF ALL RESPONSES TO PHQ QUESTIONS 1-9: 4
10. IF YOU CHECKED OFF ANY PROBLEMS, HOW DIFFICULT HAVE THESE PROBLEMS MADE IT FOR YOU TO DO YOUR WORK, TAKE CARE OF THINGS AT HOME, OR GET ALONG WITH OTHER PEOPLE: NOT DIFFICULT AT ALL
SUM OF ALL RESPONSES TO PHQ QUESTIONS 1-9: 4

## 2025-06-02 NOTE — PATIENT INSTRUCTIONS
Shots to consider:  Prevnar 20 (Pneumonia shot)  Shingrix (Shingles)  TDAP (Tetanus/pertussis)  RSV (Protection against respiratory illness-RSV)

## 2025-06-02 NOTE — PROGRESS NOTES
ICD-10-CM    1. Type 2 diabetes mellitus without complication, unspecified whether long term insulin use (H)  E11.9 Albumin Random Urine Quantitative with Creat Ratio     HEMOGLOBIN A1C     Lipid panel reflex to direct LDL Non-fasting      2. Hypertension goal BP (blood pressure) < 140/90  I10 losartan (COZAAR) 100 MG tablet     metoprolol succinate ER (TOPROL XL) 100 MG 24 hr tablet     hydrochlorothiazide (HYDRODIURIL) 25 MG tablet      3. Type 2 diabetes mellitus without complication, without long-term current use of insulin (H)  E11.9 metFORMIN (GLUCOPHAGE) 500 MG tablet     simvastatin (ZOCOR) 20 MG tablet      4. Hypercholesterolemia  E78.00 simvastatin (ZOCOR) 20 MG tablet      5. Screening for cardiovascular condition  Z13.6       6. Carcinoma of upper-inner quadrant of left breast in female, estrogen receptor positive (H)  C50.212     Z17.0       7. Type 2 diabetes mellitus with other specified complication, without long-term current use of insulin (H)  E11.69         -Radha plans to get shots performed at Pharmacy  -Radha has regular follow-ups with oncology  She declines eye exam and referral  The longitudinal plan of care for the diagnosis(es)/condition(s) as documented were addressed during this visit. Due to the added complexity in care, I will continue to support Radha in the subsequent management and with ongoing continuity of care.        Gaudencio Garcia is a 75 year old, presenting for the following health issues:  Diabetes        6/2/2025     1:19 PM   Additional Questions   Roomed by Amber BROWN     History of Present Illness       Diabetes:   She presents for follow up of diabetes.    She is not checking blood glucose.         She has no concerns regarding her diabetes at this time.   She is not experiencing numbness or burning in feet, excessive thirst, blurry vision, weight changes or redness, sores or blisters on feet. The patient has not had a diabetic eye exam in the last 12 months.   "        She eats 0-1 servings of fruits and vegetables daily.She consumes 1 sweetened beverage(s) daily.She exercises with enough effort to increase her heart rate 30 to 60 minutes per day.  She exercises with enough effort to increase her heart rate 3 or less days per week.   She is taking medications regularly.      Hypertension Follow-up    Do you check your blood pressure regularly outside of the clinic? No   Are you following a low salt diet? No  Are your blood pressures ever more than 140 on the top number (systolic) OR more   than 90 on the bottom number (diastolic), for example 140/90? No          Review of Systems  Constitutional, HEENT, cardiovascular, pulmonary, gi and gu systems are negative, except as otherwise noted.      Objective    BP (!) 153/80   Pulse 74   Temp 97.9  F (36.6  C) (Temporal)   Resp 18   Ht 1.676 m (5' 6\")   Wt 51 kg (112 lb 8 oz)   LMP  (LMP Unknown)   SpO2 100%   BMI 18.16 kg/m    Body mass index is 18.16 kg/m .  Physical Exam   GENERAL: alert and no distress  EYES: Eyes grossly normal to inspection, PERRL and conjunctivae and sclerae normal  NECK: no adenopathy, no asymmetry, masses, or scars  RESP: lungs clear to auscultation - no rales, rhonchi or wheezes  CV: regular rate and rhythm, normal S1 S2, no S3 or S4, no murmur, click or rub, no peripheral edema  ABDOMEN: soft, nontender, no hepatosplenomegaly, no masses and bowel sounds normal  MS: no gross musculoskeletal defects noted, no edema  PSYCH: mentation appears normal, affect normal/bright  Diabetic foot exam: normal DP and PT pulses, no trophic changes or ulcerative lesions, normal sensory exam, and nail exam onychomycosis of the toenails    Oncology Visit on 12/17/2024   Component Date Value Ref Range Status    Sodium 12/17/2024 137  135 - 145 mmol/L Final    Potassium 12/17/2024 4.1  3.4 - 5.3 mmol/L Final    Carbon Dioxide (CO2) 12/17/2024 27  22 - 29 mmol/L Final    Anion Gap 12/17/2024 13  7 - 15 mmol/L Final "    Urea Nitrogen 12/17/2024 25.2 (H)  8.0 - 23.0 mg/dL Final    Creatinine 12/17/2024 1.13 (H)  0.51 - 0.95 mg/dL Final    GFR Estimate 12/17/2024 51 (L)  >60 mL/min/1.73m2 Final    eGFR calculated using 2021 CKD-EPI equation.    Calcium 12/17/2024 9.9  8.8 - 10.4 mg/dL Final    Reference intervals for this test were updated on 7/16/2024 to reflect our healthy population more accurately. There may be differences in the flagging of prior results with similar values performed with this method. Those prior results can be interpreted in the context of the updated reference intervals.    Chloride 12/17/2024 97 (L)  98 - 107 mmol/L Final    Glucose 12/17/2024 103 (H)  70 - 99 mg/dL Final    Alkaline Phosphatase 12/17/2024 52  40 - 150 U/L Final    AST 12/17/2024 22  0 - 45 U/L Final    ALT 12/17/2024 11  0 - 50 U/L Final    Protein Total 12/17/2024 6.9  6.4 - 8.3 g/dL Final    Albumin 12/17/2024 4.3  3.5 - 5.2 g/dL Final    Bilirubin Total 12/17/2024 0.3  <=1.2 mg/dL Final    WBC Count 12/17/2024 9.7  4.0 - 11.0 10e3/uL Final    RBC Count 12/17/2024 3.47 (L)  3.80 - 5.20 10e6/uL Final    Hemoglobin 12/17/2024 10.7 (L)  11.7 - 15.7 g/dL Final    Hematocrit 12/17/2024 31.6 (L)  35.0 - 47.0 % Final    MCV 12/17/2024 91  78 - 100 fL Final    MCH 12/17/2024 30.8  26.5 - 33.0 pg Final    MCHC 12/17/2024 33.9  31.5 - 36.5 g/dL Final    RDW 12/17/2024 12.5  10.0 - 15.0 % Final    Platelet Count 12/17/2024 269  150 - 450 10e3/uL Final    % Neutrophils 12/17/2024 62  % Final    % Lymphocytes 12/17/2024 26  % Final    % Monocytes 12/17/2024 8  % Final    % Eosinophils 12/17/2024 2  % Final    % Basophils 12/17/2024 1  % Final    % Immature Granulocytes 12/17/2024 0  % Final    NRBCs per 100 WBC 12/17/2024 0  <1 /100 Final    Absolute Neutrophils 12/17/2024 6.0  1.6 - 8.3 10e3/uL Final    Absolute Lymphocytes 12/17/2024 2.5  0.8 - 5.3 10e3/uL Final    Absolute Monocytes 12/17/2024 0.8  0.0 - 1.3 10e3/uL Final    Absolute Eosinophils  12/17/2024 0.2  0.0 - 0.7 10e3/uL Final    Absolute Basophils 12/17/2024 0.1  0.0 - 0.2 10e3/uL Final    Absolute Immature Granulocytes 12/17/2024 0.0  <=0.4 10e3/uL Final    Absolute NRBCs 12/17/2024 0.0  10e3/uL Final           Signed Electronically by: NATHAN Millan CNP

## 2025-06-03 ENCOUNTER — RESULTS FOLLOW-UP (OUTPATIENT)
Dept: FAMILY MEDICINE | Facility: CLINIC | Age: 75
End: 2025-06-03

## 2025-06-03 DIAGNOSIS — E78.5 HYPERLIPIDEMIA LDL GOAL <100: Primary | ICD-10-CM

## 2025-06-03 RX ORDER — SIMVASTATIN 40 MG
40 TABLET ORAL AT BEDTIME
Qty: 90 TABLET | Refills: 3 | Status: SHIPPED | OUTPATIENT
Start: 2025-06-03